# Patient Record
Sex: MALE | Race: WHITE | Employment: OTHER | URBAN - METROPOLITAN AREA
[De-identification: names, ages, dates, MRNs, and addresses within clinical notes are randomized per-mention and may not be internally consistent; named-entity substitution may affect disease eponyms.]

---

## 2017-07-26 ENCOUNTER — OFFICE VISIT (OUTPATIENT)
Dept: FAMILY MEDICINE CLINIC | Age: 67
End: 2017-07-26
Payer: MEDICARE

## 2017-07-26 ENCOUNTER — HOSPITAL ENCOUNTER (OUTPATIENT)
Age: 67
Setting detail: SPECIMEN
Discharge: HOME | End: 2017-07-26
Payer: MEDICARE

## 2017-07-26 VITALS
HEIGHT: 70 IN | WEIGHT: 213 LBS | BODY MASS INDEX: 30.49 KG/M2 | SYSTOLIC BLOOD PRESSURE: 140 MMHG | TEMPERATURE: 97 F | HEART RATE: 106 BPM | OXYGEN SATURATION: 98 % | DIASTOLIC BLOOD PRESSURE: 75 MMHG

## 2017-07-26 DIAGNOSIS — Z00.00 HEALTH CARE MAINTENANCE: ICD-10-CM

## 2017-07-26 DIAGNOSIS — Z79.4 TYPE 2 DIABETES MELLITUS WITH INSULIN THERAPY (HCC): ICD-10-CM

## 2017-07-26 DIAGNOSIS — E11.42 TYPE 2 DIABETES MELLITUS WITH DIABETIC POLYNEUROPATHY, WITHOUT LONG-TERM CURRENT USE OF INSULIN (HCC): Primary | ICD-10-CM

## 2017-07-26 DIAGNOSIS — G62.9 NEUROPATHY: ICD-10-CM

## 2017-07-26 DIAGNOSIS — E11.9 TYPE 2 DIABETES MELLITUS WITH INSULIN THERAPY (HCC): ICD-10-CM

## 2017-07-26 DIAGNOSIS — Z12.11 SCREENING FOR COLON CANCER: ICD-10-CM

## 2017-07-26 DIAGNOSIS — I10 ESSENTIAL HYPERTENSION: ICD-10-CM

## 2017-07-26 LAB
ABSOLUTE EOS #: 0.1 K/UL (ref 0–0.4)
ABSOLUTE LYMPH #: 1.6 K/UL (ref 1–4.8)
ABSOLUTE MONO #: 0.3 K/UL (ref 0.1–1.2)
ALBUMIN SERPL-MCNC: 4.1 G/DL (ref 3.5–5.2)
ALBUMIN/GLOBULIN RATIO: 1.3 (ref 1–2.5)
ALP BLD-CCNC: 73 U/L (ref 40–129)
ALT SERPL-CCNC: 15 U/L (ref 5–41)
ANION GAP SERPL CALCULATED.3IONS-SCNC: 17 MMOL/L (ref 9–17)
AST SERPL-CCNC: 15 U/L
BASOPHILS # BLD: 0 %
BASOPHILS ABSOLUTE: 0 K/UL (ref 0–0.2)
BILIRUB SERPL-MCNC: 0.35 MG/DL (ref 0.3–1.2)
BUN BLDV-MCNC: 15 MG/DL (ref 8–23)
BUN/CREAT BLD: ABNORMAL (ref 9–20)
CALCIUM SERPL-MCNC: 9.9 MG/DL (ref 8.6–10.4)
CHLORIDE BLD-SCNC: 96 MMOL/L (ref 98–107)
CHOLESTEROL/HDL RATIO: 6.2
CHOLESTEROL: 228 MG/DL
CO2: 19 MMOL/L (ref 20–31)
CREAT SERPL-MCNC: 0.76 MG/DL (ref 0.7–1.2)
CREATININE URINE POCT: 100
DIFFERENTIAL TYPE: NORMAL
EOSINOPHILS RELATIVE PERCENT: 2 %
GFR AFRICAN AMERICAN: >60 ML/MIN
GFR NON-AFRICAN AMERICAN: >60 ML/MIN
GFR SERPL CREATININE-BSD FRML MDRD: ABNORMAL ML/MIN/{1.73_M2}
GFR SERPL CREATININE-BSD FRML MDRD: ABNORMAL ML/MIN/{1.73_M2}
GLUCOSE BLD-MCNC: 425 MG/DL (ref 70–99)
HBA1C MFR BLD: 11.8 %
HCT VFR BLD CALC: 41.7 % (ref 41–53)
HDLC SERPL-MCNC: 37 MG/DL
HEMOGLOBIN: 14.3 G/DL (ref 13.5–17.5)
LDL CHOLESTEROL DIRECT: 110 MG/DL
LDL CHOLESTEROL: ABNORMAL MG/DL (ref 0–130)
LYMPHOCYTES # BLD: 26 %
MCH RBC QN AUTO: 29.1 PG (ref 26–34)
MCHC RBC AUTO-ENTMCNC: 34.3 G/DL (ref 31–37)
MCV RBC AUTO: 85 FL (ref 80–100)
MICROALBUMIN/CREAT 24H UR: 80 MG/G{CREAT}
MICROALBUMIN/CREAT UR-RTO: ABNORMAL
MONOCYTES # BLD: 4 %
PDW BLD-RTO: 14.6 % (ref 12.5–15.4)
PLATELET # BLD: 199 K/UL (ref 140–450)
PLATELET ESTIMATE: NORMAL
PMV BLD AUTO: 8.1 FL (ref 6–12)
POTASSIUM SERPL-SCNC: 4.7 MMOL/L (ref 3.7–5.3)
RBC # BLD: 4.9 M/UL (ref 4.5–5.9)
RBC # BLD: NORMAL 10*6/UL
SEG NEUTROPHILS: 68 %
SEGMENTED NEUTROPHILS ABSOLUTE COUNT: 4.3 K/UL (ref 1.8–7.7)
SODIUM BLD-SCNC: 132 MMOL/L (ref 135–144)
TOTAL PROTEIN: 7.3 G/DL (ref 6.4–8.3)
TRIGL SERPL-MCNC: 571 MG/DL
TSH SERPL DL<=0.05 MIU/L-ACNC: 1.9 MIU/L (ref 0.3–5)
VLDLC SERPL CALC-MCNC: ABNORMAL MG/DL (ref 1–30)
WBC # BLD: 6.4 K/UL (ref 3.5–11)
WBC # BLD: NORMAL 10*3/UL

## 2017-07-26 PROCEDURE — 99214 OFFICE O/P EST MOD 30 MIN: CPT | Performed by: NURSE PRACTITIONER

## 2017-07-26 PROCEDURE — G8417 CALC BMI ABV UP PARAM F/U: HCPCS | Performed by: NURSE PRACTITIONER

## 2017-07-26 PROCEDURE — 83036 HEMOGLOBIN GLYCOSYLATED A1C: CPT | Performed by: NURSE PRACTITIONER

## 2017-07-26 PROCEDURE — 82044 UR ALBUMIN SEMIQUANTITATIVE: CPT | Performed by: NURSE PRACTITIONER

## 2017-07-26 PROCEDURE — 4040F PNEUMOC VAC/ADMIN/RCVD: CPT | Performed by: NURSE PRACTITIONER

## 2017-07-26 PROCEDURE — 1123F ACP DISCUSS/DSCN MKR DOCD: CPT | Performed by: NURSE PRACTITIONER

## 2017-07-26 PROCEDURE — 3046F HEMOGLOBIN A1C LEVEL >9.0%: CPT | Performed by: NURSE PRACTITIONER

## 2017-07-26 PROCEDURE — G8427 DOCREV CUR MEDS BY ELIG CLIN: HCPCS | Performed by: NURSE PRACTITIONER

## 2017-07-26 PROCEDURE — 1036F TOBACCO NON-USER: CPT | Performed by: NURSE PRACTITIONER

## 2017-07-26 PROCEDURE — 3017F COLORECTAL CA SCREEN DOC REV: CPT | Performed by: NURSE PRACTITIONER

## 2017-07-26 RX ORDER — PREGABALIN 50 MG/1
50 CAPSULE ORAL 3 TIMES DAILY
Qty: 90 CAPSULE | Refills: 3 | Status: SHIPPED | OUTPATIENT
Start: 2017-07-26 | End: 2017-08-23 | Stop reason: SDUPTHER

## 2017-07-26 RX ORDER — ATORVASTATIN CALCIUM 80 MG/1
80 TABLET, FILM COATED ORAL DAILY
Qty: 30 TABLET | Refills: 3 | Status: SHIPPED | OUTPATIENT
Start: 2017-07-26 | End: 2017-11-27 | Stop reason: SDUPTHER

## 2017-07-26 ASSESSMENT — ENCOUNTER SYMPTOMS
ABDOMINAL PAIN: 0
NAUSEA: 0
CHEST TIGHTNESS: 0
VOMITING: 0
WHEEZING: 0
DIARRHEA: 0
SHORTNESS OF BREATH: 0

## 2017-07-26 ASSESSMENT — PATIENT HEALTH QUESTIONNAIRE - PHQ9
SUM OF ALL RESPONSES TO PHQ9 QUESTIONS 1 & 2: 2
SUM OF ALL RESPONSES TO PHQ QUESTIONS 1-9: 2
2. FEELING DOWN, DEPRESSED OR HOPELESS: 1
1. LITTLE INTEREST OR PLEASURE IN DOING THINGS: 1

## 2017-08-01 ENCOUNTER — TELEPHONE (OUTPATIENT)
Dept: FAMILY MEDICINE CLINIC | Age: 67
End: 2017-08-01

## 2017-08-23 ENCOUNTER — OFFICE VISIT (OUTPATIENT)
Dept: FAMILY MEDICINE CLINIC | Age: 67
End: 2017-08-23
Payer: MEDICARE

## 2017-08-23 VITALS
SYSTOLIC BLOOD PRESSURE: 120 MMHG | OXYGEN SATURATION: 98 % | TEMPERATURE: 97.6 F | WEIGHT: 216 LBS | BODY MASS INDEX: 30.99 KG/M2 | DIASTOLIC BLOOD PRESSURE: 70 MMHG | HEART RATE: 106 BPM

## 2017-08-23 DIAGNOSIS — E11.42 TYPE 2 DIABETES MELLITUS WITH DIABETIC POLYNEUROPATHY, WITHOUT LONG-TERM CURRENT USE OF INSULIN (HCC): ICD-10-CM

## 2017-08-23 DIAGNOSIS — Z79.4 TYPE 2 DIABETES MELLITUS WITH INSULIN THERAPY (HCC): Primary | ICD-10-CM

## 2017-08-23 DIAGNOSIS — M20.41 HAMMER TOE OF RIGHT FOOT: ICD-10-CM

## 2017-08-23 DIAGNOSIS — E11.9 TYPE 2 DIABETES MELLITUS WITH INSULIN THERAPY (HCC): Primary | ICD-10-CM

## 2017-08-23 DIAGNOSIS — L98.499 SKIN ULCER, WITH UNSPECIFIED SEVERITY (HCC): ICD-10-CM

## 2017-08-23 DIAGNOSIS — G62.9 NEUROPATHY: ICD-10-CM

## 2017-08-23 LAB — HBA1C MFR BLD: 10.9 %

## 2017-08-23 PROCEDURE — 99214 OFFICE O/P EST MOD 30 MIN: CPT | Performed by: NURSE PRACTITIONER

## 2017-08-23 PROCEDURE — 3017F COLORECTAL CA SCREEN DOC REV: CPT | Performed by: NURSE PRACTITIONER

## 2017-08-23 PROCEDURE — 1123F ACP DISCUSS/DSCN MKR DOCD: CPT | Performed by: NURSE PRACTITIONER

## 2017-08-23 PROCEDURE — 3046F HEMOGLOBIN A1C LEVEL >9.0%: CPT | Performed by: NURSE PRACTITIONER

## 2017-08-23 PROCEDURE — G8417 CALC BMI ABV UP PARAM F/U: HCPCS | Performed by: NURSE PRACTITIONER

## 2017-08-23 PROCEDURE — 1036F TOBACCO NON-USER: CPT | Performed by: NURSE PRACTITIONER

## 2017-08-23 PROCEDURE — 4040F PNEUMOC VAC/ADMIN/RCVD: CPT | Performed by: NURSE PRACTITIONER

## 2017-08-23 PROCEDURE — 83036 HEMOGLOBIN GLYCOSYLATED A1C: CPT | Performed by: NURSE PRACTITIONER

## 2017-08-23 PROCEDURE — G8427 DOCREV CUR MEDS BY ELIG CLIN: HCPCS | Performed by: NURSE PRACTITIONER

## 2017-08-23 RX ORDER — PREGABALIN 100 MG/1
100 CAPSULE ORAL 3 TIMES DAILY
Qty: 90 CAPSULE | Refills: 2 | Status: SHIPPED | OUTPATIENT
Start: 2017-08-23 | End: 2017-11-27 | Stop reason: SDUPTHER

## 2017-08-23 ASSESSMENT — ENCOUNTER SYMPTOMS
WHEEZING: 0
NAUSEA: 0
DIARRHEA: 0
CHEST TIGHTNESS: 0
SHORTNESS OF BREATH: 0
ABDOMINAL PAIN: 0
VOMITING: 0

## 2017-08-28 ENCOUNTER — TELEPHONE (OUTPATIENT)
Dept: FAMILY MEDICINE CLINIC | Age: 67
End: 2017-08-28

## 2017-08-29 DIAGNOSIS — E11.42 TYPE 2 DIABETES MELLITUS WITH DIABETIC POLYNEUROPATHY, WITHOUT LONG-TERM CURRENT USE OF INSULIN (HCC): ICD-10-CM

## 2017-09-21 ENCOUNTER — OFFICE VISIT (OUTPATIENT)
Dept: FAMILY MEDICINE CLINIC | Age: 67
End: 2017-09-21
Payer: MEDICARE

## 2017-09-21 VITALS
OXYGEN SATURATION: 98 % | DIASTOLIC BLOOD PRESSURE: 54 MMHG | WEIGHT: 221 LBS | SYSTOLIC BLOOD PRESSURE: 87 MMHG | BODY MASS INDEX: 31.71 KG/M2 | TEMPERATURE: 98.5 F | HEART RATE: 101 BPM

## 2017-09-21 DIAGNOSIS — E11.9 TYPE 2 DIABETES MELLITUS WITH INSULIN THERAPY (HCC): Primary | ICD-10-CM

## 2017-09-21 DIAGNOSIS — Z79.4 TYPE 2 DIABETES MELLITUS WITH INSULIN THERAPY (HCC): Primary | ICD-10-CM

## 2017-09-21 DIAGNOSIS — L98.499 SKIN ULCER, WITH UNSPECIFIED SEVERITY (HCC): ICD-10-CM

## 2017-09-21 LAB — HBA1C MFR BLD: 10.1 %

## 2017-09-21 PROCEDURE — 4040F PNEUMOC VAC/ADMIN/RCVD: CPT | Performed by: NURSE PRACTITIONER

## 2017-09-21 PROCEDURE — G8427 DOCREV CUR MEDS BY ELIG CLIN: HCPCS | Performed by: NURSE PRACTITIONER

## 2017-09-21 PROCEDURE — 1123F ACP DISCUSS/DSCN MKR DOCD: CPT | Performed by: NURSE PRACTITIONER

## 2017-09-21 PROCEDURE — 3017F COLORECTAL CA SCREEN DOC REV: CPT | Performed by: NURSE PRACTITIONER

## 2017-09-21 PROCEDURE — G8417 CALC BMI ABV UP PARAM F/U: HCPCS | Performed by: NURSE PRACTITIONER

## 2017-09-21 PROCEDURE — 99214 OFFICE O/P EST MOD 30 MIN: CPT | Performed by: NURSE PRACTITIONER

## 2017-09-21 PROCEDURE — 83036 HEMOGLOBIN GLYCOSYLATED A1C: CPT | Performed by: NURSE PRACTITIONER

## 2017-09-21 PROCEDURE — 3046F HEMOGLOBIN A1C LEVEL >9.0%: CPT | Performed by: NURSE PRACTITIONER

## 2017-09-21 PROCEDURE — 1036F TOBACCO NON-USER: CPT | Performed by: NURSE PRACTITIONER

## 2017-09-21 RX ORDER — PEN NEEDLE, DIABETIC 31 GX5/16"
NEEDLE, DISPOSABLE MISCELLANEOUS
Refills: 0 | COMMUNITY
Start: 2017-08-30 | End: 2017-10-23

## 2017-09-21 RX ORDER — AMOXICILLIN 500 MG/1
500 CAPSULE ORAL 3 TIMES DAILY
Qty: 30 CAPSULE | Refills: 0 | Status: SHIPPED | OUTPATIENT
Start: 2017-09-21 | End: 2017-10-01

## 2017-09-21 RX ORDER — ATORVASTATIN CALCIUM 80 MG/1
80 TABLET, FILM COATED ORAL DAILY
Qty: 30 TABLET | Refills: 5 | Status: CANCELLED | OUTPATIENT
Start: 2017-09-21

## 2017-09-21 RX ORDER — CIPROFLOXACIN 500 MG/1
500 TABLET, FILM COATED ORAL DAILY
Refills: 0 | COMMUNITY
Start: 2017-09-11 | End: 2017-10-23 | Stop reason: ALTCHOICE

## 2017-09-21 ASSESSMENT — ENCOUNTER SYMPTOMS
DIARRHEA: 0
CHEST TIGHTNESS: 0
NAUSEA: 0
ABDOMINAL PAIN: 0
WHEEZING: 0
SHORTNESS OF BREATH: 0
VOMITING: 0

## 2017-10-23 ENCOUNTER — OFFICE VISIT (OUTPATIENT)
Dept: FAMILY MEDICINE CLINIC | Age: 67
End: 2017-10-23
Payer: MEDICARE

## 2017-10-23 VITALS
DIASTOLIC BLOOD PRESSURE: 70 MMHG | WEIGHT: 221 LBS | SYSTOLIC BLOOD PRESSURE: 120 MMHG | TEMPERATURE: 97.9 F | HEART RATE: 101 BPM | BODY MASS INDEX: 31.71 KG/M2 | OXYGEN SATURATION: 98 %

## 2017-10-23 DIAGNOSIS — M20.41 HAMMER TOE OF RIGHT FOOT: ICD-10-CM

## 2017-10-23 DIAGNOSIS — E11.9 TYPE 2 DIABETES MELLITUS WITH INSULIN THERAPY (HCC): Primary | ICD-10-CM

## 2017-10-23 DIAGNOSIS — Z79.4 TYPE 2 DIABETES MELLITUS WITH INSULIN THERAPY (HCC): Primary | ICD-10-CM

## 2017-10-23 DIAGNOSIS — E11.42 TYPE 2 DIABETES MELLITUS WITH DIABETIC POLYNEUROPATHY, WITHOUT LONG-TERM CURRENT USE OF INSULIN (HCC): ICD-10-CM

## 2017-10-23 LAB — HBA1C MFR BLD: 9.3 %

## 2017-10-23 PROCEDURE — G8417 CALC BMI ABV UP PARAM F/U: HCPCS | Performed by: NURSE PRACTITIONER

## 2017-10-23 PROCEDURE — 1123F ACP DISCUSS/DSCN MKR DOCD: CPT | Performed by: NURSE PRACTITIONER

## 2017-10-23 PROCEDURE — 99213 OFFICE O/P EST LOW 20 MIN: CPT | Performed by: NURSE PRACTITIONER

## 2017-10-23 PROCEDURE — 1036F TOBACCO NON-USER: CPT | Performed by: NURSE PRACTITIONER

## 2017-10-23 PROCEDURE — G8427 DOCREV CUR MEDS BY ELIG CLIN: HCPCS | Performed by: NURSE PRACTITIONER

## 2017-10-23 PROCEDURE — 3017F COLORECTAL CA SCREEN DOC REV: CPT | Performed by: NURSE PRACTITIONER

## 2017-10-23 PROCEDURE — 3046F HEMOGLOBIN A1C LEVEL >9.0%: CPT | Performed by: NURSE PRACTITIONER

## 2017-10-23 PROCEDURE — 83036 HEMOGLOBIN GLYCOSYLATED A1C: CPT | Performed by: NURSE PRACTITIONER

## 2017-10-23 PROCEDURE — 4040F PNEUMOC VAC/ADMIN/RCVD: CPT | Performed by: NURSE PRACTITIONER

## 2017-10-23 PROCEDURE — G8484 FLU IMMUNIZE NO ADMIN: HCPCS | Performed by: NURSE PRACTITIONER

## 2017-10-23 RX ORDER — ATORVASTATIN CALCIUM 80 MG/1
80 TABLET, FILM COATED ORAL DAILY
Qty: 30 TABLET | Refills: 5 | Status: CANCELLED | OUTPATIENT
Start: 2017-10-23

## 2017-10-23 RX ORDER — PREGABALIN 100 MG/1
100 CAPSULE ORAL 3 TIMES DAILY
Qty: 90 CAPSULE | Refills: 2 | Status: CANCELLED | OUTPATIENT
Start: 2017-10-23

## 2017-10-23 ASSESSMENT — ENCOUNTER SYMPTOMS
WHEEZING: 0
NAUSEA: 0
DIARRHEA: 0
ABDOMINAL PAIN: 0
CHEST TIGHTNESS: 0
VOMITING: 0
SHORTNESS OF BREATH: 0

## 2017-10-23 NOTE — PROGRESS NOTES
Patient is present for a 1 month follow up for DM. Patient will get flu vaccine at [facility]. Visit Information    Have you changed or started any medications since your last visit including any over-the-counter medicines, vitamins, or herbal medicines? no   Have you stopped taking any of your medications? Is so, why? -  no  Are you having any side effects from any of your medications? - no    Have you seen any other physician or provider since your last visit?  no   Have you had any other diagnostic tests since your last visit?  no   Have you been seen in the emergency room and/or had an admission in a hospital since we last saw you?  no   Have you had your routine dental cleaning in the past 6 months?  no     Do you have an active MyChart account? If no, what is the barrier?   No:     Patient Care Team:  Patito Geronimo NP as PCP - General (Certified Nurse Practitioner)  [address] History Review  Past Medical, Family, and Social History reviewed and does contribute to the patient presenting condition    Health Maintenance   Topic Date Due    AAA screen  1950    Diabetic retinal exam  08/07/1960    Flu vaccine (1) 09/01/2017    Pneumococcal low/med risk (1 of 2 - PCV13) 01/26/2018 (Originally 8/7/2015)    Colon cancer screen colonoscopy  01/26/2018 (Originally 8/7/2000)    Zostavax vaccine  07/26/2018 (Originally 8/7/2010)    DTaP/Tdap/Td vaccine (1 - Tdap) 07/26/2018 (Originally 8/7/1969)    Diabetic hemoglobin A1C test  12/21/2017    Diabetic microalbuminuria test  07/26/2018    Lipid screen  07/26/2018    Diabetic foot exam  08/23/2018    Hepatitis C screen  Completed
Skin: Skin is warm and dry. No rash noted. No erythema. many tattoos   Psychiatric: He has a normal mood and affect. His behavior is normal. Judgment and thought content normal.   Vitals reviewed. Assessment:      1. Type 2 diabetes mellitus with insulin therapy ([facility] [address]    2. Type 2 diabetes mellitus with diabetic polyneuropathy, without long-term current use of insulin (HCC)    3. Hammer toe of right foot        Plan:     1. Type 2 diabetes mellitus with insulin therapy (HCC)    - POCT glycosylated hemoglobin (Hb A1C)-down to 9.3  Given [employer]'s assistance paperwork to see if we can keep him on toujeo. - Insulin Pen Needle 30G X 8 MM MISC; 1 each by Does not apply route daily  Dispense: 100 each; Refill: 5  He is not in need of refills today. Does not like the small needles, cannot tell if he has injected himself, larger ones sent in.   2. Type 2 diabetes mellitus with diabetic polyneuropathy, without long-term current use of insulin (HCC)    Continue to follow with Dr. Brook Ramírez This Encounter   Procedures    POCT glycosylated hemoglobin (Hb A1C)       No Follow-up on file. Patient given educational materials - see patient instructions. Discussed use, benefit, and side effects of prescribed medications. All patient questions answered. Pt voiced understanding. Reviewed health maintenance. Instructed to continue current medications, diet and exercise. Patient agreed with treatment plan. Follow up as directed.      Electronically signed by Zach Zambrano NP on 10/23/2017

## 2017-11-27 ENCOUNTER — OFFICE VISIT (OUTPATIENT)
Dept: FAMILY MEDICINE CLINIC | Age: 67
End: 2017-11-27
Payer: MEDICARE

## 2017-11-27 VITALS
OXYGEN SATURATION: 98 % | DIASTOLIC BLOOD PRESSURE: 70 MMHG | WEIGHT: 220 LBS | BODY MASS INDEX: 31.57 KG/M2 | SYSTOLIC BLOOD PRESSURE: 130 MMHG | HEART RATE: 104 BPM | TEMPERATURE: 98.2 F

## 2017-11-27 DIAGNOSIS — Z23 NEED FOR INFLUENZA VACCINATION: ICD-10-CM

## 2017-11-27 DIAGNOSIS — Z12.11 SCREENING FOR COLON CANCER: ICD-10-CM

## 2017-11-27 DIAGNOSIS — I10 ESSENTIAL HYPERTENSION: ICD-10-CM

## 2017-11-27 DIAGNOSIS — Z23 NEED FOR PNEUMOCOCCAL VACCINATION: ICD-10-CM

## 2017-11-27 DIAGNOSIS — Z79.4 TYPE 2 DIABETES MELLITUS WITH INSULIN THERAPY (HCC): ICD-10-CM

## 2017-11-27 DIAGNOSIS — E11.9 TYPE 2 DIABETES MELLITUS WITH INSULIN THERAPY (HCC): ICD-10-CM

## 2017-11-27 LAB — HBA1C MFR BLD: 10 %

## 2017-11-27 PROCEDURE — G0008 ADMIN INFLUENZA VIRUS VAC: HCPCS | Performed by: NURSE PRACTITIONER

## 2017-11-27 PROCEDURE — 90662 IIV NO PRSV INCREASED AG IM: CPT | Performed by: NURSE PRACTITIONER

## 2017-11-27 PROCEDURE — G8484 FLU IMMUNIZE NO ADMIN: HCPCS | Performed by: NURSE PRACTITIONER

## 2017-11-27 PROCEDURE — G0009 ADMIN PNEUMOCOCCAL VACCINE: HCPCS | Performed by: NURSE PRACTITIONER

## 2017-11-27 PROCEDURE — 99214 OFFICE O/P EST MOD 30 MIN: CPT | Performed by: NURSE PRACTITIONER

## 2017-11-27 PROCEDURE — 90670 PCV13 VACCINE IM: CPT | Performed by: NURSE PRACTITIONER

## 2017-11-27 PROCEDURE — 3046F HEMOGLOBIN A1C LEVEL >9.0%: CPT | Performed by: NURSE PRACTITIONER

## 2017-11-27 PROCEDURE — 4040F PNEUMOC VAC/ADMIN/RCVD: CPT | Performed by: NURSE PRACTITIONER

## 2017-11-27 PROCEDURE — 83036 HEMOGLOBIN GLYCOSYLATED A1C: CPT | Performed by: NURSE PRACTITIONER

## 2017-11-27 PROCEDURE — 1036F TOBACCO NON-USER: CPT | Performed by: NURSE PRACTITIONER

## 2017-11-27 PROCEDURE — 3017F COLORECTAL CA SCREEN DOC REV: CPT | Performed by: NURSE PRACTITIONER

## 2017-11-27 PROCEDURE — G8417 CALC BMI ABV UP PARAM F/U: HCPCS | Performed by: NURSE PRACTITIONER

## 2017-11-27 PROCEDURE — 1123F ACP DISCUSS/DSCN MKR DOCD: CPT | Performed by: NURSE PRACTITIONER

## 2017-11-27 PROCEDURE — G8427 DOCREV CUR MEDS BY ELIG CLIN: HCPCS | Performed by: NURSE PRACTITIONER

## 2017-11-27 RX ORDER — PREGABALIN 100 MG/1
100 CAPSULE ORAL 3 TIMES DAILY
Qty: 90 CAPSULE | Refills: 2 | Status: SHIPPED | OUTPATIENT
Start: 2017-11-27 | End: 2019-01-01

## 2017-11-27 RX ORDER — ATORVASTATIN CALCIUM 80 MG/1
80 TABLET, FILM COATED ORAL DAILY
Qty: 90 TABLET | Refills: 1 | Status: SHIPPED | OUTPATIENT
Start: 2017-11-27 | End: 2019-01-01

## 2017-11-27 ASSESSMENT — ENCOUNTER SYMPTOMS
NAUSEA: 0
DIARRHEA: 0
SHORTNESS OF BREATH: 0
VOMITING: 0
ABDOMINAL PAIN: 0
WHEEZING: 0
CHEST TIGHTNESS: 0

## 2017-11-27 NOTE — PROGRESS NOTES
Alcohol use No      Comment: occassionally      Current Outpatient Prescriptions   Medication Sig Dispense Refill    atorvastatin (LIPITOR) 80 MG tablet Take 1 tablet by mouth daily 90 tablet 1    aspirin 81 MG tablet Take 1 tablet by mouth daily (with breakfast) 30 tablet 11    insulin glargine (TOUJEO SOLOSTAR) 300 UNIT/ML injection pen Inject 70 Units into the skin nightly 2 Pen 5    metFORMIN (GLUCOPHAGE) 1000 MG tablet Take 1 tablet by mouth 2 times daily (with meals) 180 tablet 1    pregabalin (LYRICA) 100 MG capsule Take 1 capsule by mouth 3 times daily . 90 capsule 2    dapagliflozin (FARXIGA) 5 MG tablet Take 1 tablet by mouth every morning 90 tablet 1    insulin glargine (TOUJEO SOLOSTAR) 300 UNIT/ML injection pen Inject 70 Units into the skin nightly 2 Pen 0    Blood Glucose Monitoring Suppl DONG 1 Device by Does not apply route three times daily 1 Device 0    Insulin Pen Needle 30G X 8 MM MISC 1 each by Does not apply route daily 100 each 5    Blood Glucose Monitoring Suppl DONG Test 3 times a day   Can fill with whatever insurance will cover    Dx e11.9 1 Device 0    glucose blood VI test strips (GLUCOSE METER TEST) strip 1 each by In Vitro route daily 100 each 5    citalopram (CELEXA) 20 MG tablet Take 20 mg by mouth daily      QUEtiapine (SEROQUEL) 300 MG tablet Take 300 mg by mouth 2 times daily      Lancets MISC Test daily and prn 50 each 3     No current facility-administered medications for this visit.       Allergies   Allergen Reactions    Keflex [Cephalexin] Hives       Health Maintenance   Topic Date Due    AAA screen  1950    Colon cancer screen colonoscopy  02/27/2018 (Originally 8/7/2000)    Zostavax vaccine  07/26/2018 (Originally 8/7/2010)    DTaP/Tdap/Td vaccine (1 - Tdap) 07/26/2018 (Originally 8/7/1969)    Diabetic retinal exam  10/23/2018 (Originally 8/7/1960)    Diabetic hemoglobin A1C test  01/23/2018    Diabetic microalbuminuria test  07/26/2018    Lipid screen  07/26/2018    Diabetic foot exam  08/23/2018    Pneumococcal low/med risk (2 of 2 - PPSV23) 11/27/2018    Flu vaccine  Completed    Hepatitis C screen  Completed          Review of Systems   Constitutional: Negative for activity change, appetite change, chills, fatigue, fever and unexpected weight change. Eyes: Positive for visual disturbance (blurry vision in left eye). Respiratory: Negative for chest tightness, shortness of breath and wheezing. Cardiovascular: Negative for chest pain and leg swelling. Gastrointestinal: Negative for abdominal pain, diarrhea, nausea and vomiting. Endocrine: Positive for polydipsia and polyuria. Musculoskeletal: Positive for arthralgias. Negative for myalgias. + joint stiffness   Skin: Negative for rash and wound. + toenail problem   Neurological: Positive for numbness. Negative for dizziness, weakness and headaches. Psychiatric/Behavioral: Positive for dysphoric mood. All other systems reviewed and are negative. Objective:     /70 (Site: Left Arm, Position: Sitting, Cuff Size: Large Adult)   Pulse 104   Temp 98.2 °F (36.8 °C) (Oral)   Wt 220 lb (99.8 kg)   SpO2 98%   BMI 31.57 kg/m²   Physical Exam   Constitutional: He is oriented to person, place, and time. Vital signs are normal. He appears well-developed and well-nourished. HENT:   Head: Normocephalic and atraumatic. Eyes: Conjunctivae are normal. Pupils are equal, round, and reactive to light. Right eye exhibits no discharge. Left eye exhibits no discharge. Neck: Normal range of motion. Neck supple. No thyromegaly present. Cardiovascular: Normal rate, regular rhythm and normal heart sounds. Exam reveals no gallop and no friction rub. No murmur heard. Pulmonary/Chest: Effort normal and breath sounds normal. No respiratory distress. He has no wheezes. He has no rales. Abdominal: Soft. Bowel sounds are normal. He exhibits no distension.  There is no tenderness. Musculoskeletal: Normal range of motion. He exhibits no edema. Neurological: He is alert and oriented to person, place, and time. Skin: Skin is warm and dry. No rash noted. No erythema. many tattoos   Psychiatric: He has a normal mood and affect. His behavior is normal. Judgment and thought content normal.   Vitals reviewed. Assessment:      1. DM type 2, uncontrolled, with neuropathy ([facility] [address]    2. Type 2 diabetes mellitus with insulin therapy ([facility]    3. Need for influenza vaccination    4. Screening for colon cancer    5. Need for pneumococcal vaccination    6. Essential hypertension          Plan:     1. DM type 2, uncontrolled, with neuropathy (HCC)    - aspirin 81 MG tablet; Take 1 tablet by mouth daily (with breakfast)  Dispense: 30 tablet; Refill: 11  - metFORMIN (GLUCOPHAGE) 1000 MG tablet; Take 1 tablet by mouth 2 times daily (with meals)  Dispense: 180 tablet; Refill: 1  - dapagliflozin (FARXIGA) 5 MG tablet; Take 1 tablet by mouth every morning  Dispense: 90 tablet; Refill: 1  - insulin glargine (TOUJEO SOLOSTAR) 300 UNIT/ML injection pen; Inject 70 Units into the skin nightly  Dispense: 2 Pen; Refill: 0  - Blood Glucose Monitoring Suppl DONG; 1 Device by Does not apply route three times daily  Dispense: 1 Device; Refill: 0    2. Type 2 diabetes mellitus with insulin therapy (HCC)    - insulin glargine (TOUJEO SOLOSTAR) 300 UNIT/ML injection pen; Inject 70 Units into the skin nightly  Dispense: 2 Pen; Refill: 5  - metFORMIN (GLUCOPHAGE) 1000 MG tablet; Take 1 tablet by mouth 2 times daily (with meals)  Dispense: 180 tablet; Refill: 1  - POCT glycosylated hemoglobin (Hb A1C)-up at10  - dapagliflozin (FARXIGA) 5 MG tablet; Take 1 tablet by mouth every morning  Dispense: 90 tablet; Refill: 1  - insulin glargine (TOUJEO SOLOSTAR) 300 UNIT/ML injection pen;  Inject 70 Units into the skin nightly  Dispense: 2 Pen; Refill: 0  - Blood Glucose Monitoring Suppl DONG; 1 Device by Does

## 2017-11-27 NOTE — PROGRESS NOTES
Patient is present for a 1 month follow up for dm. Patient is having trouble seeing out of left eye. Patient is complaining of hand shakiness. He states this happens on and off but has been getting worse. Medication and pharmacy reviewed with patient. Patient needs a meter. Printed out assistance paperwork for the toujeo. Patient is asking for 2 samples of toujeo. Medications and pharmacy reviewed with patient. Patient gets constipated with seroquel and would like to decrease the mg if possible. Patient sees a therapist that prescribes the seroquel but patient is not to fond of her. (siva) [employer]    Visit Information    Have you changed or started any medications since your last visit including any over-the-counter medicines, vitamins, or herbal medicines? no   Have you stopped taking any of your medications? Is so, why? -  no  Are you having any side effects from any of your medications? - no    Have you seen any other physician or provider since your last visit?  no   Have you had any other diagnostic tests since your last visit?  no   Have you been seen in the emergency room and/or had an admission in a hospital since we last saw you?  no   Have you had your routine dental cleaning in the past 6 months?  no     Do you have an active MyChart account? If no, what is the barrier?   No:     Patient Care Team:  Jovanna Kim NP as PCP - General (Certified Nurse Practitioner)  Jovanna Kim NP as PCP - [facility]  [facility]    Medical History Review  Past Medical, Family, and Social History reviewed and does contribute to the patient presenting condition    Health Maintenance   Topic Date Due    AAA screen  1950    Colon cancer screen colonoscopy  08/07/2000    Pneumococcal low/med risk (1 of 2 - PCV13) 01/26/2018 (Originally 8/7/2015)    Zostavax vaccine  07/26/2018 (Originally 8/7/2010)    DTaP/Tdap/Td vaccine (1 - Tdap) 07/26/2018 (Originally 8/7/1969)  Diabetic retinal exam  10/23/2018 (Originally 8/7/1960)    Flu vaccine (1) 10/23/2018 (Originally 9/1/2017)    Diabetic hemoglobin A1C test  01/23/2018    Diabetic microalbuminuria test  07/26/2018    Lipid screen  07/26/2018    Diabetic foot exam  08/23/2018    Hepatitis C screen  Completed

## 2017-11-30 DIAGNOSIS — E11.9 TYPE 2 DIABETES MELLITUS WITH INSULIN THERAPY (HCC): Primary | ICD-10-CM

## 2017-11-30 DIAGNOSIS — Z79.4 TYPE 2 DIABETES MELLITUS WITH INSULIN THERAPY (HCC): Primary | ICD-10-CM

## 2018-02-16 NOTE — TELEPHONE ENCOUNTER
Pt is threatening to leave the practice if this is not taken care of by the end of the day today (2-16-18). He said you do this every time and he thinks you're doing it on purpose. \"Just because you don't want to do something, don't mean I have to suffer\".

## 2018-02-19 NOTE — TELEPHONE ENCOUNTER
02/16/2018 1 11/27/2017 LYRICA 100 MG CAPSULE 0 30 SH TUR [id number] THE(9900) 0 0.00 - Private Pay [place] 01/02/2018 1 08/23/2017 LYRICA 100 MG CAPSULE 90 30 SH TUR [id number] THE(9900) 2 0.00 - Medicare [place] 11/04/2017 1 08/23/2017 LYRICA 100 MG CAPSULE 90 30 SH TUR [id number] THE(9900) 1 0.00 - [place] 08/24/2017 1 08/23/2017 LYRICA 100 MG CAPSULE 90 30 SH TUR [id number] THE(9900) 0 0.00 - [place] 07/26/2017 1 07/26/2017 LYRICA 50 MG CAPSULE 90 30 SH TUR [id number] BUJ(9941) 0 0.00 - [place] 03/27/2017 1 10/25/2016 GABAPENTIN 600 MG TABLET 90 30 AS THO [id number] THE(9900) 3 0.00 - [place]

## 2018-02-19 NOTE — TELEPHONE ENCOUNTER
Pt wanted to know if it could be increased. Said he is taking it TID and it's not helping.  He said he is still having a lot of leg pain

## 2018-02-20 ENCOUNTER — TELEPHONE (OUTPATIENT)
Dept: FAMILY MEDICINE CLINIC | Age: 68
End: 2018-02-20

## 2018-02-20 RX ORDER — PREGABALIN 100 MG/1
100 CAPSULE ORAL 3 TIMES DAILY
Qty: 90 CAPSULE | Refills: 1 | OUTPATIENT
Start: 2018-02-20 | End: 2018-03-22

## 2019-01-01 ENCOUNTER — PRE-PROCEDURE TELEPHONE (OUTPATIENT)
Dept: WOUND CARE | Age: 69
End: 2019-01-01

## 2019-01-01 ENCOUNTER — HOSPITAL ENCOUNTER (EMERGENCY)
Age: 69
Discharge: HOME | End: 2019-07-15
Attending: EMERGENCY MEDICINE
Payer: MEDICARE

## 2019-01-01 ENCOUNTER — OFFICE VISIT (OUTPATIENT)
Dept: PODIATRY | Age: 69
End: 2019-01-01
Payer: MEDICARE

## 2019-01-01 ENCOUNTER — APPOINTMENT (OUTPATIENT)
Dept: GENERAL RADIOLOGY | Age: 69
DRG: 853 | End: 2019-01-01
Payer: MEDICARE

## 2019-01-01 ENCOUNTER — APPOINTMENT (OUTPATIENT)
Dept: GENERAL RADIOLOGY | Age: 69
End: 2019-01-01
Payer: MEDICARE

## 2019-01-01 ENCOUNTER — APPOINTMENT (OUTPATIENT)
Dept: INTERVENTIONAL RADIOLOGY/VASCULAR | Age: 69
DRG: 853 | End: 2019-01-01
Payer: MEDICARE

## 2019-01-01 ENCOUNTER — OFFICE VISIT (OUTPATIENT)
Dept: FAMILY MEDICINE CLINIC | Age: 69
End: 2019-01-01
Payer: MEDICARE

## 2019-01-01 ENCOUNTER — HOSPITAL ENCOUNTER (OUTPATIENT)
Age: 69
Setting detail: SPECIMEN
Discharge: HOME | End: 2019-02-27
Payer: MEDICARE

## 2019-01-01 ENCOUNTER — ANESTHESIA (OUTPATIENT)
Dept: OPERATING ROOM | Age: 69
DRG: 853 | End: 2019-01-01
Payer: MEDICARE

## 2019-01-01 ENCOUNTER — TELEPHONE (OUTPATIENT)
Dept: VASCULAR SURGERY | Age: 69
End: 2019-01-01

## 2019-01-01 ENCOUNTER — ANESTHESIA EVENT (OUTPATIENT)
Dept: OPERATING ROOM | Age: 69
DRG: 853 | End: 2019-01-01
Payer: MEDICARE

## 2019-01-01 ENCOUNTER — APPOINTMENT (OUTPATIENT)
Dept: ULTRASOUND IMAGING | Age: 69
DRG: 853 | End: 2019-01-01
Payer: MEDICARE

## 2019-01-01 ENCOUNTER — TELEPHONE (OUTPATIENT)
Dept: PODIATRY | Age: 69
End: 2019-01-01

## 2019-01-01 ENCOUNTER — HOSPITAL ENCOUNTER (OUTPATIENT)
Dept: WOUND CARE | Age: 69
Discharge: HOME | End: 2019-06-18
Payer: MEDICARE

## 2019-01-01 ENCOUNTER — TELEPHONE (OUTPATIENT)
Dept: FAMILY MEDICINE CLINIC | Age: 69
End: 2019-01-01

## 2019-01-01 ENCOUNTER — HOSPITAL ENCOUNTER (INPATIENT)
Age: 69
LOS: 8 days | Discharge: NURSING, REHAB OR LONG-TERM CARE | DRG: 853 | End: 2019-05-31
Attending: EMERGENCY MEDICINE | Admitting: INTERNAL MEDICINE
Payer: MEDICARE

## 2019-01-01 ENCOUNTER — HOSPITAL ENCOUNTER (OUTPATIENT)
Dept: VASCULAR LAB | Age: 69
Discharge: HOME | End: 2019-03-19
Payer: MEDICARE

## 2019-01-01 ENCOUNTER — APPOINTMENT (OUTPATIENT)
Dept: MRI IMAGING | Age: 69
DRG: 853 | End: 2019-01-01
Payer: MEDICARE

## 2019-01-01 VITALS — BODY MASS INDEX: 29.54 KG/M2 | HEIGHT: 71 IN | WEIGHT: 211 LBS

## 2019-01-01 VITALS — DIASTOLIC BLOOD PRESSURE: 55 MMHG | SYSTOLIC BLOOD PRESSURE: 92 MMHG | OXYGEN SATURATION: 98 %

## 2019-01-01 VITALS — HEIGHT: 71 IN | WEIGHT: 211 LBS | BODY MASS INDEX: 29.54 KG/M2

## 2019-01-01 VITALS
HEART RATE: 129 BPM | WEIGHT: 185 LBS | OXYGEN SATURATION: 85 % | SYSTOLIC BLOOD PRESSURE: 94 MMHG | BODY MASS INDEX: 26.17 KG/M2 | DIASTOLIC BLOOD PRESSURE: 57 MMHG | RESPIRATION RATE: 39 BRPM | TEMPERATURE: 101.3 F

## 2019-01-01 VITALS
WEIGHT: 211.4 LBS | DIASTOLIC BLOOD PRESSURE: 56 MMHG | OXYGEN SATURATION: 99 % | BODY MASS INDEX: 31.31 KG/M2 | RESPIRATION RATE: 16 BRPM | HEART RATE: 95 BPM | TEMPERATURE: 98.6 F | SYSTOLIC BLOOD PRESSURE: 82 MMHG | HEIGHT: 69 IN

## 2019-01-01 VITALS
HEART RATE: 89 BPM | DIASTOLIC BLOOD PRESSURE: 77 MMHG | SYSTOLIC BLOOD PRESSURE: 134 MMHG | BODY MASS INDEX: 29.13 KG/M2 | TEMPERATURE: 99 F | HEIGHT: 70 IN | WEIGHT: 203.48 LBS | RESPIRATION RATE: 21 BRPM | OXYGEN SATURATION: 93 %

## 2019-01-01 VITALS — WEIGHT: 200 LBS | BODY MASS INDEX: 28 KG/M2 | HEIGHT: 71 IN

## 2019-01-01 VITALS
HEART RATE: 91 BPM | BODY MASS INDEX: 30.53 KG/M2 | OXYGEN SATURATION: 99 % | RESPIRATION RATE: 16 BRPM | DIASTOLIC BLOOD PRESSURE: 54 MMHG | SYSTOLIC BLOOD PRESSURE: 126 MMHG | WEIGHT: 215.8 LBS | TEMPERATURE: 96.9 F

## 2019-01-01 VITALS — OXYGEN SATURATION: 100 % | DIASTOLIC BLOOD PRESSURE: 76 MMHG | SYSTOLIC BLOOD PRESSURE: 107 MMHG

## 2019-01-01 VITALS — HEIGHT: 71 IN | BODY MASS INDEX: 29.54 KG/M2 | WEIGHT: 211 LBS

## 2019-01-01 DIAGNOSIS — E78.00 PURE HYPERCHOLESTEROLEMIA: ICD-10-CM

## 2019-01-01 DIAGNOSIS — Z12.5 SCREENING FOR PROSTATE CANCER: ICD-10-CM

## 2019-01-01 DIAGNOSIS — L97.322 ULCER OF ANKLE WITH FAT LAYER EXPOSED, LEFT (HCC): Primary | ICD-10-CM

## 2019-01-01 DIAGNOSIS — E78.2 MIXED HYPERLIPIDEMIA: ICD-10-CM

## 2019-01-01 DIAGNOSIS — L89.624 PRESSURE INJURY OF LEFT HEEL, STAGE 4 (HCC): Primary | ICD-10-CM

## 2019-01-01 DIAGNOSIS — E11.42 DIABETIC POLYNEUROPATHY ASSOCIATED WITH TYPE 2 DIABETES MELLITUS (HCC): ICD-10-CM

## 2019-01-01 DIAGNOSIS — M79.675 PAIN OF TOES OF BOTH FEET: ICD-10-CM

## 2019-01-01 DIAGNOSIS — Z13.0 SCREENING FOR DEFICIENCY ANEMIA: ICD-10-CM

## 2019-01-01 DIAGNOSIS — D64.9 ANEMIA, UNSPECIFIED TYPE: ICD-10-CM

## 2019-01-01 DIAGNOSIS — E11.51 TYPE 2 DIABETES MELLITUS WITH PERIPHERAL VASCULAR DISEASE (HCC): ICD-10-CM

## 2019-01-01 DIAGNOSIS — M20.41 HAMMER TOES OF BOTH FEET: ICD-10-CM

## 2019-01-01 DIAGNOSIS — B35.1 ONYCHOMYCOSIS OF TOENAIL: ICD-10-CM

## 2019-01-01 DIAGNOSIS — E87.5 HYPERKALEMIA: Primary | ICD-10-CM

## 2019-01-01 DIAGNOSIS — E11.10 DIABETIC KETOACIDOSIS WITHOUT COMA ASSOCIATED WITH TYPE 2 DIABETES MELLITUS (HCC): ICD-10-CM

## 2019-01-01 DIAGNOSIS — E11.65 UNCONTROLLED TYPE 2 DIABETES MELLITUS WITH HYPERGLYCEMIA (HCC): Primary | ICD-10-CM

## 2019-01-01 DIAGNOSIS — Z13.29 SCREENING FOR THYROID DISORDER: ICD-10-CM

## 2019-01-01 DIAGNOSIS — L97.429 DIABETIC ULCER OF LEFT HEEL ASSOCIATED WITH TYPE 2 DIABETES MELLITUS, UNSPECIFIED ULCER STAGE (HCC): ICD-10-CM

## 2019-01-01 DIAGNOSIS — M20.42 HAMMER TOES OF BOTH FEET: ICD-10-CM

## 2019-01-01 DIAGNOSIS — M79.672 PAIN IN LEFT FOOT: ICD-10-CM

## 2019-01-01 DIAGNOSIS — R50.9 FEVER CHILLS: ICD-10-CM

## 2019-01-01 DIAGNOSIS — M86.172 ACUTE OSTEOMYELITIS OF CALCANEUM, LEFT (HCC): ICD-10-CM

## 2019-01-01 DIAGNOSIS — I73.9 PAD (PERIPHERAL ARTERY DISEASE) (HCC): ICD-10-CM

## 2019-01-01 DIAGNOSIS — L89.623 PRESSURE INJURY OF LEFT HEEL, STAGE 3 (HCC): Primary | ICD-10-CM

## 2019-01-01 DIAGNOSIS — M25.572 ACUTE LEFT ANKLE PAIN: ICD-10-CM

## 2019-01-01 DIAGNOSIS — I73.9 INTERMITTENT CLAUDICATION (HCC): ICD-10-CM

## 2019-01-01 DIAGNOSIS — D64.9 ANEMIA, UNSPECIFIED TYPE: Primary | ICD-10-CM

## 2019-01-01 DIAGNOSIS — A41.9 SEPSIS ASSOCIATED HYPOTENSION (HCC): ICD-10-CM

## 2019-01-01 DIAGNOSIS — I95.9 SEPSIS ASSOCIATED HYPOTENSION (HCC): ICD-10-CM

## 2019-01-01 DIAGNOSIS — L97.529 ULCER OF LEFT FOOT, UNSPECIFIED ULCER STAGE (HCC): ICD-10-CM

## 2019-01-01 DIAGNOSIS — M79.674 PAIN OF TOES OF BOTH FEET: ICD-10-CM

## 2019-01-01 DIAGNOSIS — E11.621 DIABETIC ULCER OF LEFT HEEL ASSOCIATED WITH TYPE 2 DIABETES MELLITUS, UNSPECIFIED ULCER STAGE (HCC): ICD-10-CM

## 2019-01-01 DIAGNOSIS — A41.9 SEPTICEMIA (HCC): Primary | ICD-10-CM

## 2019-01-01 LAB
-: ABNORMAL
ABO/RH: NORMAL
ABSOLUTE EOS #: 0 K/UL (ref 0–0.4)
ABSOLUTE EOS #: 0 K/UL (ref 0–0.4)
ABSOLUTE EOS #: 0.04 K/UL (ref 0–0.44)
ABSOLUTE EOS #: 0.12 K/UL (ref 0–0.44)
ABSOLUTE EOS #: 0.13 K/UL (ref 0–0.44)
ABSOLUTE EOS #: 0.16 K/UL (ref 0–0.44)
ABSOLUTE EOS #: 0.18 K/UL (ref 0–0.44)
ABSOLUTE EOS #: <0.03 K/UL (ref 0–0.44)
ABSOLUTE EOS #: <0.03 K/UL (ref 0–0.44)
ABSOLUTE IMMATURE GRANULOCYTE: 0 K/UL (ref 0–0.3)
ABSOLUTE IMMATURE GRANULOCYTE: 0.06 K/UL (ref 0–0.3)
ABSOLUTE IMMATURE GRANULOCYTE: 0.07 K/UL (ref 0–0.3)
ABSOLUTE IMMATURE GRANULOCYTE: 0.11 K/UL (ref 0–0.3)
ABSOLUTE IMMATURE GRANULOCYTE: 0.11 K/UL (ref 0–0.3)
ABSOLUTE IMMATURE GRANULOCYTE: 0.12 K/UL (ref 0–0.3)
ABSOLUTE IMMATURE GRANULOCYTE: 0.16 K/UL (ref 0–0.3)
ABSOLUTE IMMATURE GRANULOCYTE: 0.17 K/UL (ref 0–0.3)
ABSOLUTE IMMATURE GRANULOCYTE: <0.03 K/UL (ref 0–0.3)
ABSOLUTE LYMPH #: 0.52 K/UL (ref 1–4.8)
ABSOLUTE LYMPH #: 0.98 K/UL (ref 1.1–3.7)
ABSOLUTE LYMPH #: 0.99 K/UL (ref 1.1–3.7)
ABSOLUTE LYMPH #: 1 K/UL (ref 1.1–3.7)
ABSOLUTE LYMPH #: 1.08 K/UL (ref 1.1–3.7)
ABSOLUTE LYMPH #: 1.12 K/UL (ref 1–4.8)
ABSOLUTE LYMPH #: 1.14 K/UL (ref 1.1–3.7)
ABSOLUTE LYMPH #: 1.51 K/UL (ref 1.1–3.7)
ABSOLUTE LYMPH #: 1.63 K/UL (ref 1.1–3.7)
ABSOLUTE MONO #: 0.28 K/UL (ref 0.1–0.8)
ABSOLUTE MONO #: 0.36 K/UL (ref 0.1–1.2)
ABSOLUTE MONO #: 0.38 K/UL (ref 0.1–1.2)
ABSOLUTE MONO #: 0.52 K/UL (ref 0.1–1.2)
ABSOLUTE MONO #: 0.53 K/UL (ref 0.1–1.2)
ABSOLUTE MONO #: 0.64 K/UL (ref 0.1–1.2)
ABSOLUTE MONO #: 0.65 K/UL (ref 0.1–1.2)
ABSOLUTE MONO #: 0.79 K/UL (ref 0.1–1.2)
ABSOLUTE MONO #: 0.86 K/UL (ref 0.1–0.8)
ABSOLUTE RETIC #: 0.07 M/UL (ref 0.03–0.08)
ALBUMIN (CALCULATED): 2.6 G/DL (ref 3.2–5.2)
ALBUMIN PERCENT: 46 % (ref 45–65)
ALBUMIN SERPL-MCNC: 2.3 G/DL (ref 3.5–5.2)
ALBUMIN SERPL-MCNC: 2.5 G/DL (ref 3.5–5.2)
ALBUMIN SERPL-MCNC: 3.4 G/DL (ref 3.5–5.2)
ALBUMIN SERPL-MCNC: 3.7 G/DL (ref 3.5–5.2)
ALBUMIN/GLOBULIN RATIO: 0.6 (ref 1–2.5)
ALBUMIN/GLOBULIN RATIO: 0.6 (ref 1–2.5)
ALBUMIN/GLOBULIN RATIO: 0.7 (ref 1–2.5)
ALBUMIN/GLOBULIN RATIO: 1.2 (ref 1–2.5)
ALLEN TEST: ABNORMAL
ALP BLD-CCNC: 120 U/L (ref 40–129)
ALP BLD-CCNC: 80 U/L (ref 40–129)
ALP BLD-CCNC: 86 U/L (ref 40–129)
ALP BLD-CCNC: 88 U/L (ref 40–129)
ALPHA 1 PERCENT: 5 % (ref 3–6)
ALPHA 2 PERCENT: 16 % (ref 6–13)
ALPHA-1-GLOBULIN: 0.3 G/DL (ref 0.1–0.4)
ALPHA-2-GLOBULIN: 0.9 G/DL (ref 0.5–0.9)
ALT SERPL-CCNC: 11 U/L (ref 5–41)
ALT SERPL-CCNC: 17 U/L (ref 5–41)
ALT SERPL-CCNC: 6 U/L (ref 5–41)
ALT SERPL-CCNC: 8 U/L (ref 5–41)
AMMONIA: 29 UMOL/L (ref 16–60)
AMORPHOUS: ABNORMAL
ANION GAP SERPL CALCULATED.3IONS-SCNC: 10 MMOL/L (ref 9–17)
ANION GAP SERPL CALCULATED.3IONS-SCNC: 11 MMOL/L (ref 9–17)
ANION GAP SERPL CALCULATED.3IONS-SCNC: 13 MMOL/L (ref 9–17)
ANION GAP SERPL CALCULATED.3IONS-SCNC: 14 MMOL/L (ref 9–17)
ANION GAP SERPL CALCULATED.3IONS-SCNC: 15 MMOL/L (ref 9–17)
ANION GAP SERPL CALCULATED.3IONS-SCNC: 17 MMOL/L (ref 9–17)
ANION GAP SERPL CALCULATED.3IONS-SCNC: 20 MMOL/L (ref 9–17)
ANION GAP SERPL CALCULATED.3IONS-SCNC: 22 MMOL/L (ref 9–17)
ANION GAP SERPL CALCULATED.3IONS-SCNC: 22 MMOL/L (ref 9–17)
ANION GAP SERPL CALCULATED.3IONS-SCNC: 24 MMOL/L (ref 9–17)
ANION GAP SERPL CALCULATED.3IONS-SCNC: 31 MMOL/L (ref 9–17)
ANION GAP SERPL CALCULATED.3IONS-SCNC: 34 MMOL/L (ref 9–17)
ANION GAP SERPL CALCULATED.3IONS-SCNC: 44 MMOL/L (ref 9–17)
ANION GAP SERPL CALCULATED.3IONS-SCNC: 9 MMOL/L (ref 9–17)
ANTI-NUCLEAR ANTIBODY (ANA): NEGATIVE
ANTIBODY SCREEN: NEGATIVE
ARM BAND NUMBER: NORMAL
AST SERPL-CCNC: 11 U/L
AST SERPL-CCNC: 17 U/L
AST SERPL-CCNC: 21 U/L
AST SERPL-CCNC: 6 U/L
BACTERIA: ABNORMAL
BASOPHILS # BLD: 0 % (ref 0–2)
BASOPHILS # BLD: 1 % (ref 0–2)
BASOPHILS ABSOLUTE: 0 K/UL (ref 0–0.2)
BASOPHILS ABSOLUTE: 0 K/UL (ref 0–0.2)
BASOPHILS ABSOLUTE: 0.03 K/UL (ref 0–0.2)
BASOPHILS ABSOLUTE: 0.05 K/UL (ref 0–0.2)
BASOPHILS ABSOLUTE: <0.03 K/UL (ref 0–0.2)
BETA GLOBULIN: 0.9 G/DL (ref 0.5–1.1)
BETA PERCENT: 17 % (ref 11–19)
BETA-HYDROXYBUTYRATE: 1.48 MMOL/L (ref 0.02–0.27)
BETA-HYDROXYBUTYRATE: 1.92 MMOL/L (ref 0.02–0.27)
BETA-HYDROXYBUTYRATE: 13.66 MMOL/L (ref 0.02–0.27)
BILIRUB SERPL-MCNC: 0.2 MG/DL (ref 0.3–1.2)
BILIRUB SERPL-MCNC: 0.24 MG/DL (ref 0.3–1.2)
BILIRUB SERPL-MCNC: 0.33 MG/DL (ref 0.3–1.2)
BILIRUB SERPL-MCNC: 0.43 MG/DL (ref 0.3–1.2)
BILIRUBIN DIRECT: 0.08 MG/DL
BILIRUBIN URINE: NEGATIVE
BILIRUBIN, INDIRECT: 0.16 MG/DL (ref 0–1)
BLD PROD TYP BPU: NORMAL
BNP INTERPRETATION: ABNORMAL
BUN BLDV-MCNC: 105 MG/DL (ref 8–23)
BUN BLDV-MCNC: 106 MG/DL (ref 8–23)
BUN BLDV-MCNC: 14 MG/DL (ref 8–23)
BUN BLDV-MCNC: 14 MG/DL (ref 8–23)
BUN BLDV-MCNC: 16 MG/DL (ref 8–23)
BUN BLDV-MCNC: 17 MG/DL (ref 8–23)
BUN BLDV-MCNC: 21 MG/DL (ref 8–23)
BUN BLDV-MCNC: 31 MG/DL (ref 8–23)
BUN BLDV-MCNC: 35 MG/DL (ref 8–23)
BUN BLDV-MCNC: 39 MG/DL (ref 8–23)
BUN BLDV-MCNC: 42 MG/DL (ref 8–23)
BUN BLDV-MCNC: 49 MG/DL (ref 8–23)
BUN BLDV-MCNC: 51 MG/DL (ref 8–23)
BUN BLDV-MCNC: 62 MG/DL (ref 8–23)
BUN BLDV-MCNC: 68 MG/DL (ref 8–23)
BUN BLDV-MCNC: 80 MG/DL (ref 8–23)
BUN BLDV-MCNC: 84 MG/DL (ref 8–23)
BUN BLDV-MCNC: 86 MG/DL (ref 8–23)
BUN BLDV-MCNC: 95 MG/DL (ref 8–23)
BUN BLDV-MCNC: 99 MG/DL (ref 8–23)
BUN/CREAT BLD: ABNORMAL (ref 9–20)
C-REACTIVE PROTEIN: 150.6 MG/L (ref 0–5)
C-REACTIVE PROTEIN: 186 MG/L (ref 0–5)
CALCIUM SERPL-MCNC: 7.5 MG/DL (ref 8.6–10.4)
CALCIUM SERPL-MCNC: 7.6 MG/DL (ref 8.6–10.4)
CALCIUM SERPL-MCNC: 7.7 MG/DL (ref 8.6–10.4)
CALCIUM SERPL-MCNC: 7.8 MG/DL (ref 8.6–10.4)
CALCIUM SERPL-MCNC: 8.1 MG/DL (ref 8.6–10.4)
CALCIUM SERPL-MCNC: 8.2 MG/DL (ref 8.6–10.4)
CALCIUM SERPL-MCNC: 8.3 MG/DL (ref 8.6–10.4)
CALCIUM SERPL-MCNC: 8.4 MG/DL (ref 8.6–10.4)
CALCIUM SERPL-MCNC: 8.4 MG/DL (ref 8.6–10.4)
CALCIUM SERPL-MCNC: 8.6 MG/DL (ref 8.6–10.4)
CALCIUM SERPL-MCNC: 8.6 MG/DL (ref 8.6–10.4)
CALCIUM SERPL-MCNC: 8.7 MG/DL (ref 8.6–10.4)
CALCIUM SERPL-MCNC: 8.7 MG/DL (ref 8.6–10.4)
CALCIUM SERPL-MCNC: 9 MG/DL (ref 8.6–10.4)
CALCIUM SERPL-MCNC: 9.3 MG/DL (ref 8.6–10.4)
CARBOXYHEMOGLOBIN: 1.3 % (ref 0–5)
CASTS UA: ABNORMAL /LPF (ref 0–8)
CHLORIDE BLD-SCNC: 100 MMOL/L (ref 98–107)
CHLORIDE BLD-SCNC: 101 MMOL/L (ref 98–107)
CHLORIDE BLD-SCNC: 79 MMOL/L (ref 98–107)
CHLORIDE BLD-SCNC: 87 MMOL/L (ref 98–107)
CHLORIDE BLD-SCNC: 88 MMOL/L (ref 98–107)
CHLORIDE BLD-SCNC: 94 MMOL/L (ref 98–107)
CHLORIDE BLD-SCNC: 95 MMOL/L (ref 98–107)
CHLORIDE BLD-SCNC: 96 MMOL/L (ref 98–107)
CHLORIDE BLD-SCNC: 97 MMOL/L (ref 98–107)
CHLORIDE BLD-SCNC: 99 MMOL/L (ref 98–107)
CHOLESTEROL, FASTING: 231 MG/DL
CHOLESTEROL/HDL RATIO: 7.7
CO2: 10 MMOL/L (ref 20–31)
CO2: 16 MMOL/L (ref 20–31)
CO2: 17 MMOL/L (ref 20–31)
CO2: 18 MMOL/L (ref 20–31)
CO2: 20 MMOL/L (ref 20–31)
CO2: 22 MMOL/L (ref 20–31)
CO2: 23 MMOL/L (ref 20–31)
CO2: 23 MMOL/L (ref 20–31)
CO2: 24 MMOL/L (ref 20–31)
CO2: 25 MMOL/L (ref 20–31)
CO2: 27 MMOL/L (ref 20–31)
CO2: 27 MMOL/L (ref 20–31)
CO2: 28 MMOL/L (ref 20–31)
CO2: 30 MMOL/L (ref 20–31)
CO2: 34 MMOL/L (ref 20–31)
CO2: 35 MMOL/L (ref 20–31)
CO2: 6 MMOL/L (ref 20–31)
CO2: 7 MMOL/L (ref 20–31)
COLOR: YELLOW
COMPLEMENT C3: 127 MG/DL (ref 90–180)
COMPLEMENT C4: 27 MG/DL (ref 10–40)
CREAT SERPL-MCNC: 0.98 MG/DL (ref 0.7–1.2)
CREAT SERPL-MCNC: 1.02 MG/DL (ref 0.7–1.2)
CREAT SERPL-MCNC: 1.06 MG/DL (ref 0.7–1.2)
CREAT SERPL-MCNC: 1.06 MG/DL (ref 0.7–1.2)
CREAT SERPL-MCNC: 1.17 MG/DL (ref 0.7–1.2)
CREAT SERPL-MCNC: 1.18 MG/DL (ref 0.7–1.2)
CREAT SERPL-MCNC: 1.25 MG/DL (ref 0.7–1.2)
CREAT SERPL-MCNC: 1.31 MG/DL (ref 0.7–1.2)
CREAT SERPL-MCNC: 1.33 MG/DL (ref 0.7–1.2)
CREAT SERPL-MCNC: 1.57 MG/DL (ref 0.7–1.2)
CREAT SERPL-MCNC: 1.59 MG/DL (ref 0.7–1.2)
CREAT SERPL-MCNC: 1.76 MG/DL (ref 0.7–1.2)
CREAT SERPL-MCNC: 1.88 MG/DL (ref 0.7–1.2)
CREAT SERPL-MCNC: 2.04 MG/DL (ref 0.7–1.2)
CREAT SERPL-MCNC: 2.17 MG/DL (ref 0.7–1.2)
CREAT SERPL-MCNC: 2.25 MG/DL (ref 0.7–1.2)
CREAT SERPL-MCNC: 2.29 MG/DL (ref 0.7–1.2)
CREAT SERPL-MCNC: 3.41 MG/DL (ref 0.7–1.2)
CREAT SERPL-MCNC: 3.64 MG/DL (ref 0.7–1.2)
CREAT SERPL-MCNC: 4.55 MG/DL (ref 0.7–1.2)
CREATININE URINE: 125.6 MG/DL (ref 39–259)
CREATININE URINE: 43.9 MG/DL (ref 39–259)
CROSSMATCH RESULT: NORMAL
CRYSTALS, UA: ABNORMAL /HPF
CULTURE: ABNORMAL
CULTURE: NO GROWTH
CULTURE: NORMAL
DIFFERENTIAL TYPE: ABNORMAL
DIRECT EXAM: ABNORMAL
DISPENSE STATUS BLOOD BANK: NORMAL
EKG ATRIAL RATE: 108 BPM
EKG ATRIAL RATE: 110 BPM
EKG ATRIAL RATE: 113 BPM
EKG ATRIAL RATE: 127 BPM
EKG P AXIS: 0 DEGREES
EKG P AXIS: 18 DEGREES
EKG P AXIS: 9 DEGREES
EKG P-R INTERVAL: 130 MS
EKG P-R INTERVAL: 136 MS
EKG P-R INTERVAL: 136 MS
EKG Q-T INTERVAL: 342 MS
EKG Q-T INTERVAL: 344 MS
EKG Q-T INTERVAL: 352 MS
EKG Q-T INTERVAL: 376 MS
EKG QRS DURATION: 86 MS
EKG QRS DURATION: 90 MS
EKG QRS DURATION: 90 MS
EKG QRS DURATION: 96 MS
EKG QTC CALCULATION (BAZETT): 465 MS
EKG QTC CALCULATION (BAZETT): 474 MS
EKG QTC CALCULATION (BAZETT): 475 MS
EKG QTC CALCULATION (BAZETT): 497 MS
EKG R AXIS: 38 DEGREES
EKG R AXIS: 43 DEGREES
EKG R AXIS: 65 DEGREES
EKG R AXIS: 73 DEGREES
EKG T AXIS: 14 DEGREES
EKG T AXIS: 18 DEGREES
EKG T AXIS: 4 DEGREES
EKG T AXIS: 54 DEGREES
EKG VENTRICULAR RATE: 109 BPM
EKG VENTRICULAR RATE: 110 BPM
EKG VENTRICULAR RATE: 127 BPM
EKG VENTRICULAR RATE: 96 BPM
EOSINOPHILS RELATIVE PERCENT: 0 % (ref 1–4)
EOSINOPHILS RELATIVE PERCENT: 1 % (ref 1–4)
EOSINOPHILS RELATIVE PERCENT: 1 % (ref 1–4)
EOSINOPHILS RELATIVE PERCENT: 2 % (ref 1–4)
EOSINOPHILS RELATIVE PERCENT: 2 % (ref 1–4)
EOSINOPHILS RELATIVE PERCENT: 3 % (ref 1–4)
EPITHELIAL CELLS UA: ABNORMAL /HPF (ref 0–5)
ESTIMATED AVERAGE GLUCOSE: 255 MG/DL
EXPIRATION DATE: NORMAL
FERRITIN: 1170 UG/L (ref 30–400)
FERRITIN: 157 UG/L (ref 30–400)
FIO2: ABNORMAL
FOLATE: >20 NG/ML
FREE KAPPA/LAMBDA RATIO: 2.23 (ref 0.26–1.65)
GAMMA GLOBULIN %: 16 % (ref 9–20)
GAMMA GLOBULIN: 0.9 G/DL (ref 0.5–1.5)
GFR AFRICAN AMERICAN: 16 ML/MIN
GFR AFRICAN AMERICAN: 20 ML/MIN
GFR AFRICAN AMERICAN: 22 ML/MIN
GFR AFRICAN AMERICAN: 35 ML/MIN
GFR AFRICAN AMERICAN: 35 ML/MIN
GFR AFRICAN AMERICAN: 37 ML/MIN
GFR AFRICAN AMERICAN: 40 ML/MIN
GFR AFRICAN AMERICAN: 43 ML/MIN
GFR AFRICAN AMERICAN: 47 ML/MIN
GFR AFRICAN AMERICAN: 53 ML/MIN
GFR AFRICAN AMERICAN: 54 ML/MIN
GFR AFRICAN AMERICAN: >60 ML/MIN
GFR NON-AFRICAN AMERICAN: 13 ML/MIN
GFR NON-AFRICAN AMERICAN: 17 ML/MIN
GFR NON-AFRICAN AMERICAN: 18 ML/MIN
GFR NON-AFRICAN AMERICAN: 29 ML/MIN
GFR NON-AFRICAN AMERICAN: 29 ML/MIN
GFR NON-AFRICAN AMERICAN: 30 ML/MIN
GFR NON-AFRICAN AMERICAN: 33 ML/MIN
GFR NON-AFRICAN AMERICAN: 36 ML/MIN
GFR NON-AFRICAN AMERICAN: 39 ML/MIN
GFR NON-AFRICAN AMERICAN: 44 ML/MIN
GFR NON-AFRICAN AMERICAN: 44 ML/MIN
GFR NON-AFRICAN AMERICAN: 53 ML/MIN
GFR NON-AFRICAN AMERICAN: 54 ML/MIN
GFR NON-AFRICAN AMERICAN: 57 ML/MIN
GFR NON-AFRICAN AMERICAN: >60 ML/MIN
GFR SERPL CREATININE-BSD FRML MDRD: ABNORMAL ML/MIN/{1.73_M2}
GLOBULIN: ABNORMAL G/DL (ref 1.5–3.8)
GLUCOSE BLD-MCNC: 100 MG/DL (ref 75–110)
GLUCOSE BLD-MCNC: 137 MG/DL (ref 75–110)
GLUCOSE BLD-MCNC: 142 MG/DL (ref 75–110)
GLUCOSE BLD-MCNC: 143 MG/DL (ref 75–110)
GLUCOSE BLD-MCNC: 146 MG/DL (ref 75–110)
GLUCOSE BLD-MCNC: 146 MG/DL (ref 75–110)
GLUCOSE BLD-MCNC: 153 MG/DL (ref 75–110)
GLUCOSE BLD-MCNC: 153 MG/DL (ref 75–110)
GLUCOSE BLD-MCNC: 154 MG/DL (ref 75–110)
GLUCOSE BLD-MCNC: 156 MG/DL (ref 75–110)
GLUCOSE BLD-MCNC: 158 MG/DL (ref 75–110)
GLUCOSE BLD-MCNC: 160 MG/DL (ref 75–110)
GLUCOSE BLD-MCNC: 162 MG/DL (ref 75–110)
GLUCOSE BLD-MCNC: 164 MG/DL (ref 70–99)
GLUCOSE BLD-MCNC: 166 MG/DL (ref 75–110)
GLUCOSE BLD-MCNC: 170 MG/DL (ref 75–110)
GLUCOSE BLD-MCNC: 170 MG/DL (ref 75–110)
GLUCOSE BLD-MCNC: 171 MG/DL (ref 75–110)
GLUCOSE BLD-MCNC: 172 MG/DL (ref 75–110)
GLUCOSE BLD-MCNC: 172 MG/DL (ref 75–110)
GLUCOSE BLD-MCNC: 175 MG/DL (ref 75–110)
GLUCOSE BLD-MCNC: 177 MG/DL (ref 70–99)
GLUCOSE BLD-MCNC: 177 MG/DL (ref 75–110)
GLUCOSE BLD-MCNC: 179 MG/DL (ref 70–99)
GLUCOSE BLD-MCNC: 180 MG/DL (ref 75–110)
GLUCOSE BLD-MCNC: 181 MG/DL (ref 75–110)
GLUCOSE BLD-MCNC: 182 MG/DL (ref 75–110)
GLUCOSE BLD-MCNC: 184 MG/DL (ref 75–110)
GLUCOSE BLD-MCNC: 184 MG/DL (ref 75–110)
GLUCOSE BLD-MCNC: 185 MG/DL (ref 75–110)
GLUCOSE BLD-MCNC: 186 MG/DL (ref 75–110)
GLUCOSE BLD-MCNC: 187 MG/DL (ref 75–110)
GLUCOSE BLD-MCNC: 187 MG/DL (ref 75–110)
GLUCOSE BLD-MCNC: 188 MG/DL (ref 75–110)
GLUCOSE BLD-MCNC: 189 MG/DL (ref 75–110)
GLUCOSE BLD-MCNC: 194 MG/DL (ref 75–110)
GLUCOSE BLD-MCNC: 195 MG/DL (ref 70–99)
GLUCOSE BLD-MCNC: 196 MG/DL (ref 75–110)
GLUCOSE BLD-MCNC: 197 MG/DL (ref 70–99)
GLUCOSE BLD-MCNC: 197 MG/DL (ref 75–110)
GLUCOSE BLD-MCNC: 198 MG/DL (ref 70–99)
GLUCOSE BLD-MCNC: 199 MG/DL (ref 70–99)
GLUCOSE BLD-MCNC: 202 MG/DL (ref 70–99)
GLUCOSE BLD-MCNC: 205 MG/DL (ref 75–110)
GLUCOSE BLD-MCNC: 212 MG/DL (ref 70–99)
GLUCOSE BLD-MCNC: 212 MG/DL (ref 75–110)
GLUCOSE BLD-MCNC: 222 MG/DL (ref 75–110)
GLUCOSE BLD-MCNC: 224 MG/DL (ref 70–99)
GLUCOSE BLD-MCNC: 229 MG/DL (ref 70–99)
GLUCOSE BLD-MCNC: 243 MG/DL (ref 75–110)
GLUCOSE BLD-MCNC: 245 MG/DL (ref 70–99)
GLUCOSE BLD-MCNC: 251 MG/DL (ref 75–110)
GLUCOSE BLD-MCNC: 254 MG/DL (ref 75–110)
GLUCOSE BLD-MCNC: 255 MG/DL (ref 75–110)
GLUCOSE BLD-MCNC: 258 MG/DL (ref 75–110)
GLUCOSE BLD-MCNC: 258 MG/DL (ref 75–110)
GLUCOSE BLD-MCNC: 260 MG/DL (ref 75–110)
GLUCOSE BLD-MCNC: 266 MG/DL (ref 75–110)
GLUCOSE BLD-MCNC: 268 MG/DL (ref 75–110)
GLUCOSE BLD-MCNC: 269 MG/DL (ref 75–110)
GLUCOSE BLD-MCNC: 271 MG/DL (ref 70–99)
GLUCOSE BLD-MCNC: 271 MG/DL (ref 75–110)
GLUCOSE BLD-MCNC: 280 MG/DL (ref 75–110)
GLUCOSE BLD-MCNC: 281 MG/DL (ref 75–110)
GLUCOSE BLD-MCNC: 287 MG/DL (ref 75–110)
GLUCOSE BLD-MCNC: 298 MG/DL (ref 70–99)
GLUCOSE BLD-MCNC: 298 MG/DL (ref 75–110)
GLUCOSE BLD-MCNC: 304 MG/DL (ref 70–99)
GLUCOSE BLD-MCNC: 338 MG/DL (ref 75–110)
GLUCOSE BLD-MCNC: 338 MG/DL (ref 75–110)
GLUCOSE BLD-MCNC: 343 MG/DL (ref 75–110)
GLUCOSE BLD-MCNC: 344 MG/DL (ref 75–110)
GLUCOSE BLD-MCNC: 358 MG/DL (ref 75–110)
GLUCOSE BLD-MCNC: 366 MG/DL (ref 75–110)
GLUCOSE BLD-MCNC: 396 MG/DL (ref 75–110)
GLUCOSE BLD-MCNC: 399 MG/DL (ref 70–99)
GLUCOSE BLD-MCNC: 407 MG/DL (ref 75–110)
GLUCOSE BLD-MCNC: 443 MG/DL (ref 70–99)
GLUCOSE BLD-MCNC: 453 MG/DL (ref 75–110)
GLUCOSE BLD-MCNC: 473 MG/DL (ref 75–110)
GLUCOSE BLD-MCNC: 499 MG/DL (ref 75–110)
GLUCOSE BLD-MCNC: 538 MG/DL (ref 70–99)
GLUCOSE BLD-MCNC: 549 MG/DL (ref 75–110)
GLUCOSE BLD-MCNC: 611 MG/DL (ref 70–99)
GLUCOSE BLD-MCNC: 64 MG/DL (ref 75–110)
GLUCOSE BLD-MCNC: 74 MG/DL (ref 75–110)
GLUCOSE BLD-MCNC: 830 MG/DL (ref 70–99)
GLUCOSE BLD-MCNC: >600 MG/DL (ref 75–110)
GLUCOSE BLD-MCNC: >600 MG/DL (ref 75–110)
GLUCOSE URINE: ABNORMAL
HBA1C MFR BLD: 10.5 % (ref 4–6)
HBA1C MFR BLD: 12 %
HCO3 VENOUS: 18.2 MMOL/L (ref 24–30)
HCT VFR BLD CALC: 21.4 % (ref 40.7–50.3)
HCT VFR BLD CALC: 21.5 % (ref 40.7–50.3)
HCT VFR BLD CALC: 21.7 % (ref 40.7–50.3)
HCT VFR BLD CALC: 22.1 % (ref 40.7–50.3)
HCT VFR BLD CALC: 22.2 % (ref 40.7–50.3)
HCT VFR BLD CALC: 22.4 % (ref 40.7–50.3)
HCT VFR BLD CALC: 22.6 % (ref 40.7–50.3)
HCT VFR BLD CALC: 22.6 % (ref 40.7–50.3)
HCT VFR BLD CALC: 22.7 % (ref 40.7–50.3)
HCT VFR BLD CALC: 22.9 % (ref 40.7–50.3)
HCT VFR BLD CALC: 23 % (ref 40.7–50.3)
HCT VFR BLD CALC: 23.1 % (ref 40.7–50.3)
HCT VFR BLD CALC: 23.1 % (ref 40.7–50.3)
HCT VFR BLD CALC: 23.2 % (ref 40.7–50.3)
HCT VFR BLD CALC: 23.4 % (ref 40.7–50.3)
HCT VFR BLD CALC: 23.4 % (ref 40.7–50.3)
HCT VFR BLD CALC: 23.5 % (ref 40.7–50.3)
HCT VFR BLD CALC: 23.5 % (ref 40.7–50.3)
HCT VFR BLD CALC: 23.7 % (ref 40.7–50.3)
HCT VFR BLD CALC: 23.8 % (ref 40.7–50.3)
HCT VFR BLD CALC: 24.1 % (ref 40.7–50.3)
HCT VFR BLD CALC: 25.1 % (ref 40.7–50.3)
HCT VFR BLD CALC: 25.8 % (ref 40.7–50.3)
HCT VFR BLD CALC: 26.4 % (ref 40.7–50.3)
HCT VFR BLD CALC: 38.8 % (ref 40.7–50.3)
HDLC SERPL-MCNC: 30 MG/DL
HEMOGLOBIN: 12.8 G/DL (ref 13–17)
HEMOGLOBIN: 6.7 G/DL (ref 13–17)
HEMOGLOBIN: 6.7 G/DL (ref 13–17)
HEMOGLOBIN: 6.9 G/DL (ref 13–17)
HEMOGLOBIN: 7 G/DL (ref 13–17)
HEMOGLOBIN: 7.1 G/DL (ref 13–17)
HEMOGLOBIN: 7.2 G/DL (ref 13–17)
HEMOGLOBIN: 7.3 G/DL (ref 13–17)
HEMOGLOBIN: 7.4 G/DL (ref 13–17)
HEMOGLOBIN: 7.5 G/DL (ref 13–17)
HEMOGLOBIN: 7.6 G/DL (ref 13–17)
HEMOGLOBIN: 7.6 G/DL (ref 13–17)
HEMOGLOBIN: 7.7 G/DL (ref 13–17)
HEMOGLOBIN: 7.8 G/DL (ref 13–17)
HEMOGLOBIN: 8.3 G/DL (ref 13–17)
HEMOGLOBIN: 8.3 G/DL (ref 13–17)
HEMOGLOBIN: 8.7 G/DL (ref 13–17)
HIV AG/AB: NONREACTIVE
IMMATURE GRANULOCYTES: 0 %
IMMATURE GRANULOCYTES: 0 %
IMMATURE GRANULOCYTES: 1 %
IMMATURE RETIC FRACT: 16.8 % (ref 2.7–18.3)
INR BLD: 1.1
INR BLD: 1.3
INTERVENTION: NORMAL
INTERVENTION: NORMAL
IRON SATURATION: 14 % (ref 20–55)
IRON SATURATION: 25 % (ref 20–55)
IRON: 18 UG/DL (ref 59–158)
IRON: 73 UG/DL (ref 59–158)
KAPPA FREE LIGHT CHAINS QNT: 10.73 MG/DL (ref 0.37–1.94)
KETONES, URINE: ABNORMAL
LACTIC ACID, SEPSIS WHOLE BLOOD: 1.9 MMOL/L (ref 0.5–1.9)
LACTIC ACID, SEPSIS WHOLE BLOOD: 2.1 MMOL/L (ref 0.5–1.9)
LACTIC ACID, SEPSIS WHOLE BLOOD: 3.7 MMOL/L (ref 0.5–1.9)
LACTIC ACID, SEPSIS: ABNORMAL MMOL/L (ref 0.5–1.9)
LACTIC ACID, SEPSIS: ABNORMAL MMOL/L (ref 0.5–1.9)
LACTIC ACID, SEPSIS: NORMAL MMOL/L (ref 0.5–1.9)
LACTIC ACID, WHOLE BLOOD: 1 MMOL/L (ref 0.7–2.1)
LACTIC ACID, WHOLE BLOOD: 2.2 MMOL/L (ref 0.7–2.1)
LAMBDA FREE LIGHT CHAINS QNT: 4.81 MG/DL (ref 0.57–2.63)
LDL CHOLESTEROL: 121 MG/DL (ref 0–130)
LEUKOCYTE ESTERASE, URINE: NEGATIVE
LIPASE: 32 U/L (ref 13–60)
LV EF: 60 %
LVEF MODALITY: NORMAL
LYMPHOCYTES # BLD: 10 % (ref 24–43)
LYMPHOCYTES # BLD: 11 % (ref 24–43)
LYMPHOCYTES # BLD: 11 % (ref 24–43)
LYMPHOCYTES # BLD: 12 % (ref 24–43)
LYMPHOCYTES # BLD: 13 % (ref 24–43)
LYMPHOCYTES # BLD: 27 % (ref 24–43)
LYMPHOCYTES # BLD: 3 % (ref 24–44)
LYMPHOCYTES # BLD: 4 % (ref 24–44)
LYMPHOCYTES # BLD: 7 % (ref 24–43)
Lab: ABNORMAL
Lab: ABNORMAL
Lab: NORMAL
MAGNESIUM: 1.6 MG/DL (ref 1.6–2.6)
MAGNESIUM: 1.8 MG/DL (ref 1.6–2.6)
MAGNESIUM: 1.8 MG/DL (ref 1.6–2.6)
MAGNESIUM: 1.9 MG/DL (ref 1.6–2.6)
MAGNESIUM: 2.2 MG/DL (ref 1.6–2.6)
MAGNESIUM: 2.4 MG/DL (ref 1.6–2.6)
MAGNESIUM: 2.5 MG/DL (ref 1.6–2.6)
MAGNESIUM: 3.1 MG/DL (ref 1.6–2.6)
MCH RBC QN AUTO: 25.1 PG (ref 25.2–33.5)
MCH RBC QN AUTO: 27.2 PG (ref 25.2–33.5)
MCH RBC QN AUTO: 27.5 PG (ref 25.2–33.5)
MCH RBC QN AUTO: 27.5 PG (ref 25.2–33.5)
MCH RBC QN AUTO: 27.6 PG (ref 25.2–33.5)
MCH RBC QN AUTO: 28.1 PG (ref 25.2–33.5)
MCH RBC QN AUTO: 28.4 PG (ref 25.2–33.5)
MCH RBC QN AUTO: 28.4 PG (ref 25.2–33.5)
MCH RBC QN AUTO: 28.9 PG (ref 25.2–33.5)
MCHC RBC AUTO-ENTMCNC: 29.1 G/DL (ref 28.4–34.8)
MCHC RBC AUTO-ENTMCNC: 29.5 G/DL (ref 28.4–34.8)
MCHC RBC AUTO-ENTMCNC: 29.9 G/DL (ref 28.4–34.8)
MCHC RBC AUTO-ENTMCNC: 30.2 G/DL (ref 28.4–34.8)
MCHC RBC AUTO-ENTMCNC: 30.8 G/DL (ref 28.4–34.8)
MCHC RBC AUTO-ENTMCNC: 31.9 G/DL (ref 28.4–34.8)
MCHC RBC AUTO-ENTMCNC: 32.7 G/DL (ref 28.4–34.8)
MCHC RBC AUTO-ENTMCNC: 33 G/DL (ref 28.4–34.8)
MCHC RBC AUTO-ENTMCNC: 33.6 G/DL (ref 28.4–34.8)
MCV RBC AUTO: 84.5 FL (ref 82.6–102.9)
MCV RBC AUTO: 85.1 FL (ref 82.6–102.9)
MCV RBC AUTO: 86.2 FL (ref 82.6–102.9)
MCV RBC AUTO: 88.2 FL (ref 82.6–102.9)
MCV RBC AUTO: 88.3 FL (ref 82.6–102.9)
MCV RBC AUTO: 89.7 FL (ref 82.6–102.9)
MCV RBC AUTO: 91.1 FL (ref 82.6–102.9)
MCV RBC AUTO: 91.8 FL (ref 82.6–102.9)
MCV RBC AUTO: 93.3 FL (ref 82.6–102.9)
METHEMOGLOBIN: ABNORMAL % (ref 0–1.5)
MICROALBUMIN/CREAT 24H UR: 109 MG/L
MICROALBUMIN/CREAT UR-RTO: 87 MCG/MG CREAT
MODE: ABNORMAL
MONOCYTES # BLD: 1 % (ref 1–7)
MONOCYTES # BLD: 4 % (ref 3–12)
MONOCYTES # BLD: 4 % (ref 3–12)
MONOCYTES # BLD: 5 % (ref 1–7)
MONOCYTES # BLD: 6 % (ref 3–12)
MONOCYTES # BLD: 7 % (ref 3–12)
MONOCYTES # BLD: 7 % (ref 3–12)
MORPHOLOGY: ABNORMAL
MRSA, DNA, NASAL: ABNORMAL
MUCUS: ABNORMAL
MYOGLOBIN: 189 NG/ML (ref 28–72)
NEGATIVE BASE EXCESS, VEN: 5.2 MMOL/L (ref 0–2)
NITRITE, URINE: NEGATIVE
NOTIFICATION TIME: ABNORMAL
NOTIFICATION: ABNORMAL
NRBC AUTOMATED: 0 PER 100 WBC
O2 DEVICE/FLOW/%: ABNORMAL
O2 SAT, VEN: 61.8 % (ref 60–85)
OTHER OBSERVATIONS UA: ABNORMAL
OXYHEMOGLOBIN: ABNORMAL % (ref 95–98)
PARTIAL THROMBOPLASTIN TIME: 24.7 SEC (ref 20.5–30.5)
PARTIAL THROMBOPLASTIN TIME: 28.7 SEC (ref 20.5–30.5)
PATHOLOGIST: ABNORMAL
PATHOLOGIST: NORMAL
PATIENT TEMP: 37
PCO2, VEN, TEMP ADJ: ABNORMAL MMHG (ref 39–55)
PCO2, VEN: 30.2 (ref 39–55)
PDW BLD-RTO: 13.2 % (ref 11.8–14.4)
PDW BLD-RTO: 13.5 % (ref 11.8–14.4)
PDW BLD-RTO: 13.6 % (ref 11.8–14.4)
PDW BLD-RTO: 13.6 % (ref 11.8–14.4)
PDW BLD-RTO: 13.7 % (ref 11.8–14.4)
PDW BLD-RTO: 13.8 % (ref 11.8–14.4)
PDW BLD-RTO: 13.8 % (ref 11.8–14.4)
PDW BLD-RTO: 14.1 % (ref 11.8–14.4)
PDW BLD-RTO: 15.3 % (ref 11.8–14.4)
PEEP/CPAP: ABNORMAL
PH UA: 5 (ref 5–8)
PH VENOUS: 7.4 (ref 7.32–7.42)
PH, VEN, TEMP ADJ: ABNORMAL (ref 7.32–7.42)
PHOSPHORUS: 1.7 MG/DL (ref 2.5–4.5)
PHOSPHORUS: 2 MG/DL (ref 2.5–4.5)
PHOSPHORUS: 2.2 MG/DL (ref 2.5–4.5)
PHOSPHORUS: 2.3 MG/DL (ref 2.5–4.5)
PHOSPHORUS: 3.1 MG/DL (ref 2.5–4.5)
PHOSPHORUS: 5.4 MG/DL (ref 2.5–4.5)
PLATELET # BLD: 152 K/UL (ref 138–453)
PLATELET # BLD: 181 K/UL (ref 138–453)
PLATELET # BLD: 226 K/UL (ref 138–453)
PLATELET # BLD: 258 K/UL (ref 138–453)
PLATELET # BLD: 287 K/UL (ref 138–453)
PLATELET # BLD: 322 K/UL (ref 138–453)
PLATELET # BLD: 361 K/UL (ref 138–453)
PLATELET # BLD: 376 K/UL (ref 138–453)
PLATELET # BLD: 542 K/UL (ref 138–453)
PLATELET ESTIMATE: ABNORMAL
PMV BLD AUTO: 10.1 FL (ref 8.1–13.5)
PMV BLD AUTO: 10.1 FL (ref 8.1–13.5)
PMV BLD AUTO: 10.4 FL (ref 8.1–13.5)
PMV BLD AUTO: 10.6 FL (ref 8.1–13.5)
PMV BLD AUTO: 10.7 FL (ref 8.1–13.5)
PMV BLD AUTO: 10.9 FL (ref 8.1–13.5)
PMV BLD AUTO: 9.7 FL (ref 8.1–13.5)
PMV BLD AUTO: 9.7 FL (ref 8.1–13.5)
PMV BLD AUTO: 9.8 FL (ref 8.1–13.5)
PO2, VEN, TEMP ADJ: ABNORMAL MMHG (ref 30–50)
PO2, VEN: 38.8 (ref 30–50)
POSITIVE BASE EXCESS, VEN: ABNORMAL MMOL/L (ref 0–2)
POTASSIUM SERPL-SCNC: 3.2 MMOL/L (ref 3.7–5.3)
POTASSIUM SERPL-SCNC: 3.3 MMOL/L (ref 3.7–5.3)
POTASSIUM SERPL-SCNC: 3.6 MMOL/L (ref 3.7–5.3)
POTASSIUM SERPL-SCNC: 3.6 MMOL/L (ref 3.7–5.3)
POTASSIUM SERPL-SCNC: 3.7 MMOL/L (ref 3.7–5.3)
POTASSIUM SERPL-SCNC: 3.7 MMOL/L (ref 3.7–5.3)
POTASSIUM SERPL-SCNC: 3.8 MMOL/L (ref 3.7–5.3)
POTASSIUM SERPL-SCNC: 3.9 MMOL/L (ref 3.7–5.3)
POTASSIUM SERPL-SCNC: 3.9 MMOL/L (ref 3.7–5.3)
POTASSIUM SERPL-SCNC: 4 MMOL/L (ref 3.7–5.3)
POTASSIUM SERPL-SCNC: 4.1 MMOL/L (ref 3.7–5.3)
POTASSIUM SERPL-SCNC: 4.2 MMOL/L (ref 3.7–5.3)
POTASSIUM SERPL-SCNC: 4.2 MMOL/L (ref 3.7–5.3)
POTASSIUM SERPL-SCNC: 4.5 MMOL/L (ref 3.7–5.3)
POTASSIUM SERPL-SCNC: 5.1 MMOL/L (ref 3.7–5.3)
POTASSIUM SERPL-SCNC: 5.2 MMOL/L (ref 3.7–5.3)
POTASSIUM SERPL-SCNC: 6.2 MMOL/L (ref 3.7–5.3)
POTASSIUM SERPL-SCNC: 6.6 MMOL/L (ref 3.7–5.3)
PRO-BNP: 1745 PG/ML
PROSTATE SPECIFIC ANTIGEN: 0.65 UG/L
PROTEIN ELECTROPHORESIS, SERUM: ABNORMAL
PROTEIN UA: ABNORMAL
PROTHROMBIN TIME: 11.1 SEC (ref 9–12)
PROTHROMBIN TIME: 13.7 SEC (ref 9–12)
PSV: ABNORMAL
PT. POSITION: ABNORMAL
RBC # BLD: 2.44 M/UL (ref 4.21–5.77)
RBC # BLD: 2.46 M/UL (ref 4.21–5.77)
RBC # BLD: 2.54 M/UL (ref 4.21–5.77)
RBC # BLD: 2.56 M/UL (ref 4.21–5.77)
RBC # BLD: 2.58 M/UL (ref 4.21–5.77)
RBC # BLD: 2.61 M/UL (ref 4.21–5.77)
RBC # BLD: 2.71 M/UL (ref 4.21–5.77)
RBC # BLD: 3.03 M/UL (ref 4.21–5.77)
RBC # BLD: 4.5 M/UL (ref 4.21–5.77)
RBC # BLD: ABNORMAL 10*6/UL
RBC UA: ABNORMAL /HPF (ref 0–4)
RENAL EPITHELIAL, UA: ABNORMAL /HPF
RESPIRATORY RATE: ABNORMAL
RETIC %: 2.4 % (ref 0.5–1.9)
RETIC HEMOGLOBIN: 25.4 PG (ref 28.2–35.7)
SAMPLE SITE: ABNORMAL
SEDIMENTATION RATE, ERYTHROCYTE: >130 MM (ref 0–10)
SEG NEUTROPHILS: 63 % (ref 36–65)
SEG NEUTROPHILS: 78 % (ref 36–65)
SEG NEUTROPHILS: 78 % (ref 36–65)
SEG NEUTROPHILS: 81 % (ref 36–65)
SEG NEUTROPHILS: 81 % (ref 36–65)
SEG NEUTROPHILS: 82 % (ref 36–65)
SEG NEUTROPHILS: 89 % (ref 36–65)
SEG NEUTROPHILS: 91 % (ref 36–66)
SEG NEUTROPHILS: 95 % (ref 36–66)
SEGMENTED NEUTROPHILS ABSOLUTE COUNT: 15.65 K/UL (ref 1.8–7.7)
SEGMENTED NEUTROPHILS ABSOLUTE COUNT: 19.22 K/UL (ref 1.5–8.1)
SEGMENTED NEUTROPHILS ABSOLUTE COUNT: 26.5 K/UL (ref 1.8–7.7)
SEGMENTED NEUTROPHILS ABSOLUTE COUNT: 3.83 K/UL (ref 1.5–8.1)
SEGMENTED NEUTROPHILS ABSOLUTE COUNT: 6.77 K/UL (ref 1.5–8.1)
SEGMENTED NEUTROPHILS ABSOLUTE COUNT: 6.77 K/UL (ref 1.5–8.1)
SEGMENTED NEUTROPHILS ABSOLUTE COUNT: 7.23 K/UL (ref 1.5–8.1)
SEGMENTED NEUTROPHILS ABSOLUTE COUNT: 7.41 K/UL (ref 1.5–8.1)
SEGMENTED NEUTROPHILS ABSOLUTE COUNT: 8 K/UL (ref 1.5–8.1)
SERUM IFX INTERP: NORMAL
SET RATE: ABNORMAL
SODIUM BLD-SCNC: 128 MMOL/L (ref 135–144)
SODIUM BLD-SCNC: 129 MMOL/L (ref 135–144)
SODIUM BLD-SCNC: 129 MMOL/L (ref 135–144)
SODIUM BLD-SCNC: 131 MMOL/L (ref 135–144)
SODIUM BLD-SCNC: 132 MMOL/L (ref 135–144)
SODIUM BLD-SCNC: 133 MMOL/L (ref 135–144)
SODIUM BLD-SCNC: 134 MMOL/L (ref 135–144)
SODIUM BLD-SCNC: 135 MMOL/L (ref 135–144)
SODIUM BLD-SCNC: 135 MMOL/L (ref 135–144)
SODIUM BLD-SCNC: 136 MMOL/L (ref 135–144)
SODIUM BLD-SCNC: 136 MMOL/L (ref 135–144)
SODIUM BLD-SCNC: 143 MMOL/L (ref 135–144)
SODIUM BLD-SCNC: 145 MMOL/L (ref 135–144)
SODIUM BLD-SCNC: 146 MMOL/L (ref 135–144)
SPECIFIC GRAVITY UA: 1.02 (ref 1–1.03)
SPECIMEN DESCRIPTION: ABNORMAL
SPECIMEN DESCRIPTION: NORMAL
TEXT FOR RESPIRATORY: ABNORMAL
TOTAL CK: 71 U/L (ref 39–308)
TOTAL HB: ABNORMAL G/DL (ref 12–16)
TOTAL IRON BINDING CAPACITY: 128 UG/DL (ref 250–450)
TOTAL IRON BINDING CAPACITY: 295 UG/DL (ref 250–450)
TOTAL PROT. SUM,%: 100 % (ref 98–102)
TOTAL PROT. SUM: 5.6 G/DL (ref 6.3–8.2)
TOTAL PROTEIN, URINE: 13 MG/DL
TOTAL PROTEIN: 5.7 G/DL (ref 6.4–8.3)
TOTAL PROTEIN: 6.4 G/DL (ref 6.4–8.3)
TOTAL PROTEIN: 6.5 G/DL (ref 6.4–8.3)
TOTAL PROTEIN: 6.9 G/DL (ref 6.4–8.3)
TOTAL PROTEIN: 8.1 G/DL (ref 6.4–8.3)
TOTAL RATE: ABNORMAL
TRANSFUSION STATUS: NORMAL
TRICHOMONAS: ABNORMAL
TRIGLYCERIDE, FASTING: 399 MG/DL
TROPONIN INTERP: ABNORMAL
TROPONIN T: ABNORMAL NG/ML
TROPONIN, HIGH SENSITIVITY: 100 NG/L (ref 0–22)
TROPONIN, HIGH SENSITIVITY: 104 NG/L (ref 0–22)
TROPONIN, HIGH SENSITIVITY: 27 NG/L (ref 0–22)
TROPONIN, HIGH SENSITIVITY: 34 NG/L (ref 0–22)
TROPONIN, HIGH SENSITIVITY: 39 NG/L (ref 0–22)
TSH SERPL DL<=0.05 MIU/L-ACNC: 1.22 MIU/L (ref 0.3–5)
TSH SERPL DL<=0.05 MIU/L-ACNC: 1.87 MIU/L (ref 0.3–5)
TSH SERPL DL<=0.05 MIU/L-ACNC: 1.93 MIU/L (ref 0.3–5)
TURBIDITY: CLEAR
UNIT DIVISION: 0
UNIT NUMBER: NORMAL
UNSATURATED IRON BINDING CAPACITY: 110 UG/DL (ref 112–347)
UNSATURATED IRON BINDING CAPACITY: 222 UG/DL (ref 112–347)
URINE HGB: ABNORMAL
UROBILINOGEN, URINE: NORMAL
VANCOMYCIN TROUGH DATE LAST DOSE: ABNORMAL
VANCOMYCIN TROUGH DATE LAST DOSE: ABNORMAL
VANCOMYCIN TROUGH DATE LAST DOSE: NORMAL
VANCOMYCIN TROUGH DOSE AMOUNT: ABNORMAL
VANCOMYCIN TROUGH DOSE AMOUNT: ABNORMAL
VANCOMYCIN TROUGH DOSE AMOUNT: NORMAL
VANCOMYCIN TROUGH TIME LAST DOSE: ABNORMAL
VANCOMYCIN TROUGH TIME LAST DOSE: ABNORMAL
VANCOMYCIN TROUGH TIME LAST DOSE: NORMAL
VANCOMYCIN TROUGH: 19.8 UG/ML (ref 10–20)
VANCOMYCIN TROUGH: 7.3 UG/ML (ref 10–20)
VANCOMYCIN TROUGH: 7.9 UG/ML (ref 10–20)
VITAMIN D 25-HYDROXY: 10.3 NG/ML (ref 30–100)
VLDLC SERPL CALC-MCNC: ABNORMAL MG/DL (ref 1–30)
VT: ABNORMAL
WBC # BLD: 17.2 K/UL (ref 3.5–11.3)
WBC # BLD: 21.7 K/UL (ref 3.5–11.3)
WBC # BLD: 27.9 K/UL (ref 3.5–11.3)
WBC # BLD: 6.1 K/UL (ref 3.5–11.3)
WBC # BLD: 8.7 K/UL (ref 3.5–11.3)
WBC # BLD: 8.7 K/UL (ref 3.5–11.3)
WBC # BLD: 8.9 K/UL (ref 3.5–11.3)
WBC # BLD: 9 K/UL (ref 3.5–11.3)
WBC # BLD: 9.9 K/UL (ref 3.5–11.3)
WBC # BLD: ABNORMAL 10*3/UL
WBC UA: ABNORMAL /HPF (ref 0–5)
YEAST: ABNORMAL

## 2019-01-01 PROCEDURE — 2580000003 HC RX 258: Performed by: STUDENT IN AN ORGANIZED HEALTH CARE EDUCATION/TRAINING PROGRAM

## 2019-01-01 PROCEDURE — 6370000000 HC RX 637 (ALT 250 FOR IP): Performed by: INTERNAL MEDICINE

## 2019-01-01 PROCEDURE — 2580000003 HC RX 258: Performed by: EMERGENCY MEDICINE

## 2019-01-01 PROCEDURE — 2580000003 HC RX 258: Performed by: INTERNAL MEDICINE

## 2019-01-01 PROCEDURE — 4040F PNEUMOC VAC/ADMIN/RCVD: CPT | Performed by: PODIATRIST

## 2019-01-01 PROCEDURE — 85025 COMPLETE CBC W/AUTO DIFF WBC: CPT

## 2019-01-01 PROCEDURE — 2060000000 HC ICU INTERMEDIATE R&B

## 2019-01-01 PROCEDURE — 83735 ASSAY OF MAGNESIUM: CPT

## 2019-01-01 PROCEDURE — 2500000003 HC RX 250 WO HCPCS: Performed by: STUDENT IN AN ORGANIZED HEALTH CARE EDUCATION/TRAINING PROGRAM

## 2019-01-01 PROCEDURE — 6360000002 HC RX W HCPCS: Performed by: EMERGENCY MEDICINE

## 2019-01-01 PROCEDURE — 99233 SBSQ HOSP IP/OBS HIGH 50: CPT | Performed by: INTERNAL MEDICINE

## 2019-01-01 PROCEDURE — G8427 DOCREV CUR MEDS BY ELIG CLIN: HCPCS | Performed by: INTERNAL MEDICINE

## 2019-01-01 PROCEDURE — 80048 BASIC METABOLIC PNL TOTAL CA: CPT

## 2019-01-01 PROCEDURE — 6370000000 HC RX 637 (ALT 250 FOR IP): Performed by: STUDENT IN AN ORGANIZED HEALTH CARE EDUCATION/TRAINING PROGRAM

## 2019-01-01 PROCEDURE — 82010 KETONE BODYS QUAN: CPT

## 2019-01-01 PROCEDURE — 86403 PARTICLE AGGLUT ANTBDY SCRN: CPT

## 2019-01-01 PROCEDURE — 87186 SC STD MICRODIL/AGAR DIL: CPT

## 2019-01-01 PROCEDURE — 0JBR0ZZ EXCISION OF LEFT FOOT SUBCUTANEOUS TISSUE AND FASCIA, OPEN APPROACH: ICD-10-PCS | Performed by: PODIATRIST

## 2019-01-01 PROCEDURE — 36430 TRANSFUSION BLD/BLD COMPNT: CPT

## 2019-01-01 PROCEDURE — 82947 ASSAY GLUCOSE BLOOD QUANT: CPT

## 2019-01-01 PROCEDURE — 87040 BLOOD CULTURE FOR BACTERIA: CPT

## 2019-01-01 PROCEDURE — 6360000002 HC RX W HCPCS: Performed by: INTERNAL MEDICINE

## 2019-01-01 PROCEDURE — C9113 INJ PANTOPRAZOLE SODIUM, VIA: HCPCS | Performed by: INTERNAL MEDICINE

## 2019-01-01 PROCEDURE — G8417 CALC BMI ABV UP PARAM F/U: HCPCS | Performed by: PODIATRIST

## 2019-01-01 PROCEDURE — 97110 THERAPEUTIC EXERCISES: CPT

## 2019-01-01 PROCEDURE — 85014 HEMATOCRIT: CPT

## 2019-01-01 PROCEDURE — 6360000002 HC RX W HCPCS: Performed by: STUDENT IN AN ORGANIZED HEALTH CARE EDUCATION/TRAINING PROGRAM

## 2019-01-01 PROCEDURE — 6370000000 HC RX 637 (ALT 250 FOR IP): Performed by: NURSE PRACTITIONER

## 2019-01-01 PROCEDURE — 99223 1ST HOSP IP/OBS HIGH 75: CPT | Performed by: NURSE PRACTITIONER

## 2019-01-01 PROCEDURE — 84100 ASSAY OF PHOSPHORUS: CPT

## 2019-01-01 PROCEDURE — 0DJ08ZZ INSPECTION OF UPPER INTESTINAL TRACT, VIA NATURAL OR ARTIFICIAL OPENING ENDOSCOPIC: ICD-10-PCS | Performed by: INTERNAL MEDICINE

## 2019-01-01 PROCEDURE — 93005 ELECTROCARDIOGRAM TRACING: CPT | Performed by: STUDENT IN AN ORGANIZED HEALTH CARE EDUCATION/TRAINING PROGRAM

## 2019-01-01 PROCEDURE — 6360000002 HC RX W HCPCS: Performed by: NURSE PRACTITIONER

## 2019-01-01 PROCEDURE — 83883 ASSAY NEPHELOMETRY NOT SPEC: CPT

## 2019-01-01 PROCEDURE — 36415 COLL VENOUS BLD VENIPUNCTURE: CPT

## 2019-01-01 PROCEDURE — 85730 THROMBOPLASTIN TIME PARTIAL: CPT

## 2019-01-01 PROCEDURE — 2500000003 HC RX 250 WO HCPCS: Performed by: EMERGENCY MEDICINE

## 2019-01-01 PROCEDURE — 99232 SBSQ HOSP IP/OBS MODERATE 35: CPT | Performed by: INTERNAL MEDICINE

## 2019-01-01 PROCEDURE — 11721 DEBRIDE NAIL 6 OR MORE: CPT | Performed by: PODIATRIST

## 2019-01-01 PROCEDURE — 99999 PR OFFICE/OUTPT VISIT,PROCEDURE ONLY: CPT | Performed by: PODIATRIST

## 2019-01-01 PROCEDURE — 2500000003 HC RX 250 WO HCPCS: Performed by: PODIATRIST

## 2019-01-01 PROCEDURE — 87641 MR-STAPH DNA AMP PROBE: CPT

## 2019-01-01 PROCEDURE — 85651 RBC SED RATE NONAUTOMATED: CPT

## 2019-01-01 PROCEDURE — 6360000002 HC RX W HCPCS: Performed by: HOSPITALIST

## 2019-01-01 PROCEDURE — 2709999900 HC NON-CHARGEABLE SUPPLY

## 2019-01-01 PROCEDURE — 82306 VITAMIN D 25 HYDROXY: CPT

## 2019-01-01 PROCEDURE — 82805 BLOOD GASES W/O2 SATURATION: CPT

## 2019-01-01 PROCEDURE — 99214 OFFICE O/P EST MOD 30 MIN: CPT | Performed by: INTERNAL MEDICINE

## 2019-01-01 PROCEDURE — 02HV33Z INSERTION OF INFUSION DEVICE INTO SUPERIOR VENA CAVA, PERCUTANEOUS APPROACH: ICD-10-PCS | Performed by: RADIOLOGY

## 2019-01-01 PROCEDURE — 3017F COLORECTAL CA SCREEN DOC REV: CPT | Performed by: PODIATRIST

## 2019-01-01 PROCEDURE — 3046F HEMOGLOBIN A1C LEVEL >9.0%: CPT | Performed by: PODIATRIST

## 2019-01-01 PROCEDURE — 3046F HEMOGLOBIN A1C LEVEL >9.0%: CPT | Performed by: INTERNAL MEDICINE

## 2019-01-01 PROCEDURE — 1036F TOBACCO NON-USER: CPT | Performed by: PODIATRIST

## 2019-01-01 PROCEDURE — 11043 DBRDMT MUSC&/FSCA 1ST 20/<: CPT | Performed by: PODIATRIST

## 2019-01-01 PROCEDURE — 99291 CRITICAL CARE FIRST HOUR: CPT | Performed by: INTERNAL MEDICINE

## 2019-01-01 PROCEDURE — 51798 US URINE CAPACITY MEASURE: CPT

## 2019-01-01 PROCEDURE — 85610 PROTHROMBIN TIME: CPT

## 2019-01-01 PROCEDURE — P9016 RBC LEUKOCYTES REDUCED: HCPCS

## 2019-01-01 PROCEDURE — 84484 ASSAY OF TROPONIN QUANT: CPT

## 2019-01-01 PROCEDURE — 37799 UNLISTED PX VASCULAR SURGERY: CPT

## 2019-01-01 PROCEDURE — 83605 ASSAY OF LACTIC ACID: CPT

## 2019-01-01 PROCEDURE — 11042 DBRDMT SUBQ TIS 1ST 20SQCM/<: CPT | Performed by: PODIATRIST

## 2019-01-01 PROCEDURE — 82728 ASSAY OF FERRITIN: CPT

## 2019-01-01 PROCEDURE — 2500000003 HC RX 250 WO HCPCS: Performed by: NURSE ANESTHETIST, CERTIFIED REGISTERED

## 2019-01-01 PROCEDURE — 2580000003 HC RX 258: Performed by: PHYSICIAN ASSISTANT

## 2019-01-01 PROCEDURE — C9113 INJ PANTOPRAZOLE SODIUM, VIA: HCPCS | Performed by: HOSPITALIST

## 2019-01-01 PROCEDURE — 87086 URINE CULTURE/COLONY COUNT: CPT

## 2019-01-01 PROCEDURE — 0J9R0ZZ DRAINAGE OF LEFT FOOT SUBCUTANEOUS TISSUE AND FASCIA, OPEN APPROACH: ICD-10-PCS | Performed by: PODIATRIST

## 2019-01-01 PROCEDURE — 93306 TTE W/DOPPLER COMPLETE: CPT

## 2019-01-01 PROCEDURE — G8427 DOCREV CUR MEDS BY ELIG CLIN: HCPCS | Performed by: PODIATRIST

## 2019-01-01 PROCEDURE — 87205 SMEAR GRAM STAIN: CPT

## 2019-01-01 PROCEDURE — 99213 OFFICE O/P EST LOW 20 MIN: CPT | Performed by: PODIATRIST

## 2019-01-01 PROCEDURE — 84443 ASSAY THYROID STIM HORMONE: CPT

## 2019-01-01 PROCEDURE — G8417 CALC BMI ABV UP PARAM F/U: HCPCS | Performed by: INTERNAL MEDICINE

## 2019-01-01 PROCEDURE — 93005 ELECTROCARDIOGRAM TRACING: CPT

## 2019-01-01 PROCEDURE — 2000000000 HC ICU R&B

## 2019-01-01 PROCEDURE — 85018 HEMOGLOBIN: CPT

## 2019-01-01 PROCEDURE — APPNB45 APP NON BILLABLE 31-45 MINUTES: Performed by: INTERNAL MEDICINE

## 2019-01-01 PROCEDURE — 84165 PROTEIN E-PHORESIS SERUM: CPT

## 2019-01-01 PROCEDURE — 82550 ASSAY OF CK (CPK): CPT

## 2019-01-01 PROCEDURE — 2580000003 HC RX 258: Performed by: NURSE ANESTHETIST, CERTIFIED REGISTERED

## 2019-01-01 PROCEDURE — 84156 ASSAY OF PROTEIN URINE: CPT

## 2019-01-01 PROCEDURE — 1101F PT FALLS ASSESS-DOCD LE1/YR: CPT | Performed by: INTERNAL MEDICINE

## 2019-01-01 PROCEDURE — 6360000002 HC RX W HCPCS: Performed by: NURSE ANESTHETIST, CERTIFIED REGISTERED

## 2019-01-01 PROCEDURE — 1123F ACP DISCUSS/DSCN MKR DOCD: CPT | Performed by: PODIATRIST

## 2019-01-01 PROCEDURE — 93005 ELECTROCARDIOGRAM TRACING: CPT | Performed by: EMERGENCY MEDICINE

## 2019-01-01 PROCEDURE — 1101F PT FALLS ASSESS-DOCD LE1/YR: CPT | Performed by: PODIATRIST

## 2019-01-01 PROCEDURE — 6370000000 HC RX 637 (ALT 250 FOR IP): Performed by: EMERGENCY MEDICINE

## 2019-01-01 PROCEDURE — 83036 HEMOGLOBIN GLYCOSYLATED A1C: CPT | Performed by: INTERNAL MEDICINE

## 2019-01-01 PROCEDURE — 71045 X-RAY EXAM CHEST 1 VIEW: CPT

## 2019-01-01 PROCEDURE — 80076 HEPATIC FUNCTION PANEL: CPT

## 2019-01-01 PROCEDURE — 97597 DBRDMT OPN WND 1ST 20 CM/<: CPT | Performed by: PODIATRIST

## 2019-01-01 PROCEDURE — 73630 X-RAY EXAM OF FOOT: CPT

## 2019-01-01 PROCEDURE — 2709999900 HC NON-CHARGEABLE SUPPLY: Performed by: PODIATRIST

## 2019-01-01 PROCEDURE — 2580000003 HC RX 258: Performed by: HOSPITALIST

## 2019-01-01 PROCEDURE — 2500000003 HC RX 250 WO HCPCS: Performed by: INTERNAL MEDICINE

## 2019-01-01 PROCEDURE — 1036F TOBACCO NON-USER: CPT | Performed by: INTERNAL MEDICINE

## 2019-01-01 PROCEDURE — 80202 ASSAY OF VANCOMYCIN: CPT

## 2019-01-01 PROCEDURE — 36620 INSERTION CATHETER ARTERY: CPT

## 2019-01-01 PROCEDURE — 43235 EGD DIAGNOSTIC BRUSH WASH: CPT | Performed by: INTERNAL MEDICINE

## 2019-01-01 PROCEDURE — G8484 FLU IMMUNIZE NO ADMIN: HCPCS | Performed by: INTERNAL MEDICINE

## 2019-01-01 PROCEDURE — G8484 FLU IMMUNIZE NO ADMIN: HCPCS | Performed by: PODIATRIST

## 2019-01-01 PROCEDURE — 7100000001 HC PACU RECOVERY - ADDTL 15 MIN: Performed by: PODIATRIST

## 2019-01-01 PROCEDURE — 2022F DILAT RTA XM EVC RTNOPTHY: CPT | Performed by: INTERNAL MEDICINE

## 2019-01-01 PROCEDURE — 3700000000 HC ANESTHESIA ATTENDED CARE: Performed by: PODIATRIST

## 2019-01-01 PROCEDURE — 51702 INSERT TEMP BLADDER CATH: CPT

## 2019-01-01 PROCEDURE — 97535 SELF CARE MNGMENT TRAINING: CPT

## 2019-01-01 PROCEDURE — 73590 X-RAY EXAM OF LOWER LEG: CPT

## 2019-01-01 PROCEDURE — 7100000001 HC PACU RECOVERY - ADDTL 15 MIN: Performed by: INTERNAL MEDICINE

## 2019-01-01 PROCEDURE — 3700000001 HC ADD 15 MINUTES (ANESTHESIA): Performed by: INTERNAL MEDICINE

## 2019-01-01 PROCEDURE — 2580000003 HC RX 258: Performed by: PODIATRIST

## 2019-01-01 PROCEDURE — 96372 THER/PROPH/DIAG INJ SC/IM: CPT

## 2019-01-01 PROCEDURE — 82570 ASSAY OF URINE CREATININE: CPT

## 2019-01-01 PROCEDURE — 1123F ACP DISCUSS/DSCN MKR DOCD: CPT | Performed by: INTERNAL MEDICINE

## 2019-01-01 PROCEDURE — 83880 ASSAY OF NATRIURETIC PEPTIDE: CPT

## 2019-01-01 PROCEDURE — 31720 CLEARANCE OF AIRWAYS: CPT

## 2019-01-01 PROCEDURE — 86850 RBC ANTIBODY SCREEN: CPT

## 2019-01-01 PROCEDURE — 86140 C-REACTIVE PROTEIN: CPT

## 2019-01-01 PROCEDURE — 3700000000 HC ANESTHESIA ATTENDED CARE: Performed by: INTERNAL MEDICINE

## 2019-01-01 PROCEDURE — 94760 N-INVAS EAR/PLS OXIMETRY 1: CPT

## 2019-01-01 PROCEDURE — 84155 ASSAY OF PROTEIN SERUM: CPT

## 2019-01-01 PROCEDURE — C1751 CATH, INF, PER/CENT/MIDLINE: HCPCS

## 2019-01-01 PROCEDURE — 81001 URINALYSIS AUTO W/SCOPE: CPT

## 2019-01-01 PROCEDURE — 2022F DILAT RTA XM EVC RTNOPTHY: CPT | Performed by: PODIATRIST

## 2019-01-01 PROCEDURE — 7100000000 HC PACU RECOVERY - FIRST 15 MIN: Performed by: PODIATRIST

## 2019-01-01 PROCEDURE — 6370000000 HC RX 637 (ALT 250 FOR IP): Performed by: HOSPITALIST

## 2019-01-01 PROCEDURE — 4040F PNEUMOC VAC/ADMIN/RCVD: CPT | Performed by: INTERNAL MEDICINE

## 2019-01-01 PROCEDURE — 1111F DSCHRG MED/CURRENT MED MERGE: CPT | Performed by: PODIATRIST

## 2019-01-01 PROCEDURE — 3609012800 HC EGD DIAGNOSTIC ONLY: Performed by: INTERNAL MEDICINE

## 2019-01-01 PROCEDURE — 85045 AUTOMATED RETICULOCYTE COUNT: CPT

## 2019-01-01 PROCEDURE — 94761 N-INVAS EAR/PLS OXIMETRY MLT: CPT

## 2019-01-01 PROCEDURE — 36573 INSJ PICC RS&I 5 YR+: CPT | Performed by: RADIOLOGY

## 2019-01-01 PROCEDURE — 76770 US EXAM ABDO BACK WALL COMP: CPT

## 2019-01-01 PROCEDURE — 3700000001 HC ADD 15 MINUTES (ANESTHESIA): Performed by: PODIATRIST

## 2019-01-01 PROCEDURE — 97530 THERAPEUTIC ACTIVITIES: CPT

## 2019-01-01 PROCEDURE — 97162 PT EVAL MOD COMPLEX 30 MIN: CPT

## 2019-01-01 PROCEDURE — 86920 COMPATIBILITY TEST SPIN: CPT

## 2019-01-01 PROCEDURE — 87075 CULTR BACTERIA EXCEPT BLOOD: CPT

## 2019-01-01 PROCEDURE — 93923 UPR/LXTR ART STDY 3+ LVLS: CPT

## 2019-01-01 PROCEDURE — 96367 TX/PROPH/DG ADDL SEQ IV INF: CPT

## 2019-01-01 PROCEDURE — 1200000000 HC SEMI PRIVATE

## 2019-01-01 PROCEDURE — 82140 ASSAY OF AMMONIA: CPT

## 2019-01-01 PROCEDURE — G8482 FLU IMMUNIZE ORDER/ADMIN: HCPCS | Performed by: INTERNAL MEDICINE

## 2019-01-01 PROCEDURE — 3600000013 HC SURGERY LEVEL 3 ADDTL 15MIN: Performed by: PODIATRIST

## 2019-01-01 PROCEDURE — 11046 DBRDMT MUSC&/FSCA EA ADDL: CPT | Performed by: PODIATRIST

## 2019-01-01 PROCEDURE — C9113 INJ PANTOPRAZOLE SODIUM, VIA: HCPCS | Performed by: EMERGENCY MEDICINE

## 2019-01-01 PROCEDURE — 87389 HIV-1 AG W/HIV-1&-2 AB AG IA: CPT

## 2019-01-01 PROCEDURE — 6360000004 HC RX CONTRAST MEDICATION: Performed by: INTERNAL MEDICINE

## 2019-01-01 PROCEDURE — 83874 ASSAY OF MYOGLOBIN: CPT

## 2019-01-01 PROCEDURE — 99285 EMERGENCY DEPT VISIT HI MDM: CPT

## 2019-01-01 PROCEDURE — 99222 1ST HOSP IP/OBS MODERATE 55: CPT | Performed by: INTERNAL MEDICINE

## 2019-01-01 PROCEDURE — 97598 DBRDMT OPN WND ADDL 20CM/<: CPT | Performed by: PODIATRIST

## 2019-01-01 PROCEDURE — 87070 CULTURE OTHR SPECIMN AEROBIC: CPT

## 2019-01-01 PROCEDURE — 83690 ASSAY OF LIPASE: CPT

## 2019-01-01 PROCEDURE — 86901 BLOOD TYPING SEROLOGIC RH(D): CPT

## 2019-01-01 PROCEDURE — 99233 SBSQ HOSP IP/OBS HIGH 50: CPT | Performed by: PODIATRIST

## 2019-01-01 PROCEDURE — 96365 THER/PROPH/DIAG IV INF INIT: CPT

## 2019-01-01 PROCEDURE — 97166 OT EVAL MOD COMPLEX 45 MIN: CPT

## 2019-01-01 PROCEDURE — A9579 GAD-BASE MR CONTRAST NOS,1ML: HCPCS | Performed by: INTERNAL MEDICINE

## 2019-01-01 PROCEDURE — 2500000003 HC RX 250 WO HCPCS: Performed by: HOSPITALIST

## 2019-01-01 PROCEDURE — 86334 IMMUNOFIX E-PHORESIS SERUM: CPT

## 2019-01-01 PROCEDURE — C1894 INTRO/SHEATH, NON-LASER: HCPCS

## 2019-01-01 PROCEDURE — 86160 COMPLEMENT ANTIGEN: CPT

## 2019-01-01 PROCEDURE — 86038 ANTINUCLEAR ANTIBODIES: CPT

## 2019-01-01 PROCEDURE — 87077 CULTURE AEROBIC IDENTIFY: CPT

## 2019-01-01 PROCEDURE — 80053 COMPREHEN METABOLIC PANEL: CPT

## 2019-01-01 PROCEDURE — 83540 ASSAY OF IRON: CPT

## 2019-01-01 PROCEDURE — 96375 TX/PRO/DX INJ NEW DRUG ADDON: CPT

## 2019-01-01 PROCEDURE — 3017F COLORECTAL CA SCREEN DOC REV: CPT | Performed by: INTERNAL MEDICINE

## 2019-01-01 PROCEDURE — 6360000002 HC RX W HCPCS

## 2019-01-01 PROCEDURE — 3600000003 HC SURGERY LEVEL 3 BASE: Performed by: PODIATRIST

## 2019-01-01 PROCEDURE — 99203 OFFICE O/P NEW LOW 30 MIN: CPT | Performed by: PODIATRIST

## 2019-01-01 PROCEDURE — 86900 BLOOD TYPING SEROLOGIC ABO: CPT

## 2019-01-01 PROCEDURE — 7100000000 HC PACU RECOVERY - FIRST 15 MIN: Performed by: INTERNAL MEDICINE

## 2019-01-01 PROCEDURE — 0QBM0ZZ EXCISION OF LEFT TARSAL, OPEN APPROACH: ICD-10-PCS | Performed by: PODIATRIST

## 2019-01-01 PROCEDURE — 83550 IRON BINDING TEST: CPT

## 2019-01-01 PROCEDURE — 73720 MRI LWR EXTREMITY W/O&W/DYE: CPT

## 2019-01-01 RX ORDER — SENNA PLUS 8.6 MG/1
1 TABLET ORAL DAILY PRN
Status: DISCONTINUED | OUTPATIENT
Start: 2019-01-01 | End: 2019-01-01 | Stop reason: HOSPADM

## 2019-01-01 RX ORDER — DEXTROSE MONOHYDRATE 50 MG/ML
100 INJECTION, SOLUTION INTRAVENOUS PRN
Status: DISCONTINUED | OUTPATIENT
Start: 2019-01-01 | End: 2019-01-01 | Stop reason: HOSPADM

## 2019-01-01 RX ORDER — FERROUS SULFATE 325(65) MG
325 TABLET ORAL 2 TIMES DAILY
Qty: 60 TABLET | Refills: 3 | Status: SHIPPED | OUTPATIENT
Start: 2019-01-01

## 2019-01-01 RX ORDER — GLUCOSAMINE HCL/CHONDROITIN SU 500-400 MG
CAPSULE ORAL
Qty: 50 STRIP | Refills: 6 | Status: SHIPPED | OUTPATIENT
Start: 2019-01-01 | End: 2020-03-26

## 2019-01-01 RX ORDER — ACETAMINOPHEN 650 MG
TABLET, EXTENDED RELEASE ORAL
Status: COMPLETED
Start: 2019-01-01 | End: 2019-01-01

## 2019-01-01 RX ORDER — POTASSIUM CHLORIDE 7.45 MG/ML
10 INJECTION INTRAVENOUS PRN
Status: DISCONTINUED | OUTPATIENT
Start: 2019-01-01 | End: 2019-01-01

## 2019-01-01 RX ORDER — FLUCONAZOLE 100 MG/1
100 TABLET ORAL DAILY
Qty: 2 TABLET | Refills: 0 | Status: SHIPPED | OUTPATIENT
Start: 2019-01-01 | End: 2019-01-01

## 2019-01-01 RX ORDER — QUETIAPINE FUMARATE 300 MG/1
300 TABLET, FILM COATED ORAL NIGHTLY
Status: DISCONTINUED | OUTPATIENT
Start: 2019-01-01 | End: 2019-01-01 | Stop reason: HOSPADM

## 2019-01-01 RX ORDER — INSULIN GLARGINE 100 [IU]/ML
40 INJECTION, SOLUTION SUBCUTANEOUS 2 TIMES DAILY
Status: DISCONTINUED | OUTPATIENT
Start: 2019-01-01 | End: 2019-01-01

## 2019-01-01 RX ORDER — CIPROFLOXACIN 500 MG/1
500 TABLET, FILM COATED ORAL 2 TIMES DAILY
Qty: 68 TABLET | Refills: 0 | Status: SHIPPED | OUTPATIENT
Start: 2019-01-01 | End: 2019-01-01

## 2019-01-01 RX ORDER — GLYCOPYRROLATE 1 MG/5 ML
SYRINGE (ML) INTRAVENOUS PRN
Status: DISCONTINUED | OUTPATIENT
Start: 2019-01-01 | End: 2019-01-01 | Stop reason: SDUPTHER

## 2019-01-01 RX ORDER — PROPOFOL 10 MG/ML
INJECTION, EMULSION INTRAVENOUS PRN
Status: DISCONTINUED | OUTPATIENT
Start: 2019-01-01 | End: 2019-01-01 | Stop reason: SDUPTHER

## 2019-01-01 RX ORDER — FLASH GLUCOSE SENSOR
KIT MISCELLANEOUS
Qty: 2 EACH | Refills: 5 | Status: SHIPPED | OUTPATIENT
Start: 2019-01-01

## 2019-01-01 RX ORDER — SODIUM CHLORIDE 0.9 % (FLUSH) 0.9 %
10 SYRINGE (ML) INJECTION PRN
Status: DISCONTINUED | OUTPATIENT
Start: 2019-01-01 | End: 2019-01-01 | Stop reason: HOSPADM

## 2019-01-01 RX ORDER — SUCRALFATE 1 G/1
1 TABLET ORAL 4 TIMES DAILY
Qty: 120 TABLET | Refills: 0 | Status: SHIPPED | OUTPATIENT
Start: 2019-01-01

## 2019-01-01 RX ORDER — LISINOPRIL 5 MG/1
5 TABLET ORAL DAILY
Qty: 30 TABLET | Refills: 5 | Status: SHIPPED | OUTPATIENT
Start: 2019-01-01

## 2019-01-01 RX ORDER — SODIUM CHLORIDE, SODIUM LACTATE, POTASSIUM CHLORIDE, CALCIUM CHLORIDE 600; 310; 30; 20 MG/100ML; MG/100ML; MG/100ML; MG/100ML
INJECTION, SOLUTION INTRAVENOUS CONTINUOUS PRN
Status: DISCONTINUED | OUTPATIENT
Start: 2019-01-01 | End: 2019-01-01 | Stop reason: SDUPTHER

## 2019-01-01 RX ORDER — PANTOPRAZOLE SODIUM 40 MG/10ML
40 INJECTION, POWDER, LYOPHILIZED, FOR SOLUTION INTRAVENOUS 2 TIMES DAILY
Status: DISCONTINUED | OUTPATIENT
Start: 2019-01-01 | End: 2019-01-01

## 2019-01-01 RX ORDER — SODIUM CHLORIDE 9 MG/ML
INJECTION, SOLUTION INTRAVENOUS CONTINUOUS
Status: DISCONTINUED | OUTPATIENT
Start: 2019-01-01 | End: 2019-01-01

## 2019-01-01 RX ORDER — METRONIDAZOLE 500 MG/1
500 TABLET ORAL EVERY 8 HOURS SCHEDULED
Status: DISCONTINUED | OUTPATIENT
Start: 2019-01-01 | End: 2019-01-01 | Stop reason: HOSPADM

## 2019-01-01 RX ORDER — QUETIAPINE FUMARATE 300 MG/1
300 TABLET, FILM COATED ORAL DAILY
Status: DISCONTINUED | OUTPATIENT
Start: 2019-01-01 | End: 2019-01-01

## 2019-01-01 RX ORDER — DEXTROSE MONOHYDRATE 25 G/50ML
12.5 INJECTION, SOLUTION INTRAVENOUS PRN
Status: DISCONTINUED | OUTPATIENT
Start: 2019-01-01 | End: 2019-01-01 | Stop reason: HOSPADM

## 2019-01-01 RX ORDER — MAGNESIUM SULFATE 1 G/100ML
1 INJECTION INTRAVENOUS ONCE
Status: COMPLETED | OUTPATIENT
Start: 2019-01-01 | End: 2019-01-01

## 2019-01-01 RX ORDER — 0.9 % SODIUM CHLORIDE 0.9 %
1000 INTRAVENOUS SOLUTION INTRAVENOUS ONCE
Status: COMPLETED | OUTPATIENT
Start: 2019-01-01 | End: 2019-01-01

## 2019-01-01 RX ORDER — GAUZE BANDAGE 3"X4"
SPONGE TOPICAL
Qty: 12 EACH | Refills: 1 | Status: SHIPPED | OUTPATIENT
Start: 2019-01-01

## 2019-01-01 RX ORDER — PANTOPRAZOLE SODIUM 40 MG/1
TABLET, DELAYED RELEASE ORAL
Qty: 150 TABLET | Refills: 0 | Status: SHIPPED | OUTPATIENT
Start: 2019-01-01 | End: 2019-09-29

## 2019-01-01 RX ORDER — MAGNESIUM SULFATE 1 G/100ML
2 INJECTION INTRAVENOUS ONCE
Status: COMPLETED | OUTPATIENT
Start: 2019-01-01 | End: 2019-01-01

## 2019-01-01 RX ORDER — 0.9 % SODIUM CHLORIDE 0.9 %
250 INTRAVENOUS SOLUTION INTRAVENOUS ONCE
Status: DISCONTINUED | OUTPATIENT
Start: 2019-01-01 | End: 2019-01-01 | Stop reason: HOSPADM

## 2019-01-01 RX ORDER — SOFT LENS RINSE,STORE SOLUTION
1 SOLUTION, NON-ORAL MISCELLANEOUS 2 TIMES DAILY
Qty: 1 BOTTLE | Refills: 2 | Status: SHIPPED | OUTPATIENT
Start: 2019-01-01 | End: 2019-01-01 | Stop reason: SDUPTHER

## 2019-01-01 RX ORDER — CIPROFLOXACIN 500 MG/1
500 TABLET, FILM COATED ORAL EVERY 12 HOURS SCHEDULED
Status: DISCONTINUED | OUTPATIENT
Start: 2019-01-01 | End: 2019-01-01 | Stop reason: HOSPADM

## 2019-01-01 RX ORDER — METOCLOPRAMIDE HYDROCHLORIDE 5 MG/ML
10 INJECTION INTRAMUSCULAR; INTRAVENOUS ONCE
Status: COMPLETED | OUTPATIENT
Start: 2019-01-01 | End: 2019-01-01

## 2019-01-01 RX ORDER — VANCOMYCIN HYDROCHLORIDE 1 G/200ML
1000 INJECTION, SOLUTION INTRAVENOUS EVERY 12 HOURS
Status: DISCONTINUED | OUTPATIENT
Start: 2019-01-01 | End: 2019-01-01 | Stop reason: HOSPADM

## 2019-01-01 RX ORDER — FLASH GLUCOSE SCANNING READER
EACH MISCELLANEOUS
Qty: 2 DEVICE | Refills: 5 | Status: SHIPPED | OUTPATIENT
Start: 2019-01-01

## 2019-01-01 RX ORDER — SOFT LENS ADJUNCTIVE SOLUTIONS
AEROSOL (ML) MISCELLANEOUS
Qty: 1 BOTTLE | Refills: 1 | Status: SHIPPED | OUTPATIENT
Start: 2019-01-01

## 2019-01-01 RX ORDER — LORAZEPAM 0.5 MG/1
0.5 TABLET ORAL EVERY 4 HOURS PRN
COMMUNITY

## 2019-01-01 RX ORDER — PIPERACILLIN SODIUM, TAZOBACTAM SODIUM 3; .375 G/15ML; G/15ML
3.38 INJECTION, POWDER, LYOPHILIZED, FOR SOLUTION INTRAVENOUS EVERY 8 HOURS
Qty: 303.75 G | Refills: 1 | Status: SHIPPED | OUTPATIENT
Start: 2019-01-01 | End: 2019-01-01 | Stop reason: HOSPADM

## 2019-01-01 RX ORDER — SODIUM CHLORIDE 0.9 % (FLUSH) 0.9 %
10 SYRINGE (ML) INJECTION EVERY 12 HOURS SCHEDULED
Status: DISCONTINUED | OUTPATIENT
Start: 2019-01-01 | End: 2019-01-01 | Stop reason: HOSPADM

## 2019-01-01 RX ORDER — LABETALOL HYDROCHLORIDE 5 MG/ML
5 INJECTION, SOLUTION INTRAVENOUS EVERY 10 MIN PRN
Status: DISCONTINUED | OUTPATIENT
Start: 2019-01-01 | End: 2019-01-01 | Stop reason: HOSPADM

## 2019-01-01 RX ORDER — 0.9 % SODIUM CHLORIDE 0.9 %
10 VIAL (ML) INJECTION 2 TIMES DAILY
Status: DISCONTINUED | OUTPATIENT
Start: 2019-01-01 | End: 2019-01-01 | Stop reason: HOSPADM

## 2019-01-01 RX ORDER — FENTANYL CITRATE 50 UG/ML
INJECTION, SOLUTION INTRAMUSCULAR; INTRAVENOUS PRN
Status: DISCONTINUED | OUTPATIENT
Start: 2019-01-01 | End: 2019-01-01 | Stop reason: SDUPTHER

## 2019-01-01 RX ORDER — FENTANYL CITRATE 50 UG/ML
50 INJECTION, SOLUTION INTRAMUSCULAR; INTRAVENOUS ONCE
Status: DISCONTINUED | OUTPATIENT
Start: 2019-01-01 | End: 2019-01-01 | Stop reason: HOSPADM

## 2019-01-01 RX ORDER — GAUZE BANDAGE 4" X 4"
BANDAGE TOPICAL
Qty: 100 EACH | Refills: 1 | Status: SHIPPED | OUTPATIENT
Start: 2019-01-01

## 2019-01-01 RX ORDER — OMEGA-3S/DHA/EPA/FISH OIL/D3 300MG-1000
800 CAPSULE ORAL DAILY
Qty: 30 TABLET | Refills: 2 | Status: SHIPPED | OUTPATIENT
Start: 2019-01-01

## 2019-01-01 RX ORDER — LABETALOL HYDROCHLORIDE 5 MG/ML
10 INJECTION, SOLUTION INTRAVENOUS EVERY 6 HOURS PRN
Status: DISCONTINUED | OUTPATIENT
Start: 2019-01-01 | End: 2019-01-01 | Stop reason: HOSPADM

## 2019-01-01 RX ORDER — DICLOFENAC SODIUM 75 MG/1
75 TABLET, DELAYED RELEASE ORAL 2 TIMES DAILY WITH MEALS
Qty: 60 TABLET | Refills: 3 | Status: SHIPPED | OUTPATIENT
Start: 2019-01-01

## 2019-01-01 RX ORDER — MAGNESIUM SULFATE 1 G/100ML
1 INJECTION INTRAVENOUS PRN
Status: DISCONTINUED | OUTPATIENT
Start: 2019-01-01 | End: 2019-01-01

## 2019-01-01 RX ORDER — GABAPENTIN 100 MG/1
100 CAPSULE ORAL 3 TIMES DAILY
Status: DISCONTINUED | OUTPATIENT
Start: 2019-01-01 | End: 2019-01-01 | Stop reason: HOSPADM

## 2019-01-01 RX ORDER — INSULIN GLARGINE 100 [IU]/ML
30 INJECTION, SOLUTION SUBCUTANEOUS 2 TIMES DAILY
Status: DISCONTINUED | OUTPATIENT
Start: 2019-01-01 | End: 2019-01-01

## 2019-01-01 RX ORDER — LIDOCAINE HYDROCHLORIDE 10 MG/ML
INJECTION, SOLUTION EPIDURAL; INFILTRATION; INTRACAUDAL; PERINEURAL PRN
Status: DISCONTINUED | OUTPATIENT
Start: 2019-01-01 | End: 2019-01-01 | Stop reason: ALTCHOICE

## 2019-01-01 RX ORDER — HEPARIN SODIUM 5000 [USP'U]/ML
5000 INJECTION, SOLUTION INTRAVENOUS; SUBCUTANEOUS EVERY 8 HOURS SCHEDULED
Status: DISCONTINUED | OUTPATIENT
Start: 2019-01-01 | End: 2019-01-01 | Stop reason: HOSPADM

## 2019-01-01 RX ORDER — OXYCODONE HYDROCHLORIDE 5 MG/1
5 TABLET ORAL EVERY 4 HOURS PRN
Status: DISCONTINUED | OUTPATIENT
Start: 2019-01-01 | End: 2019-01-01 | Stop reason: HOSPADM

## 2019-01-01 RX ORDER — BLOOD PRESSURE TEST KIT
KIT MISCELLANEOUS
Qty: 5 EACH | Refills: 1 | Status: SHIPPED | OUTPATIENT
Start: 2019-01-01

## 2019-01-01 RX ORDER — PANTOPRAZOLE SODIUM 40 MG/1
40 TABLET, DELAYED RELEASE ORAL
Status: DISCONTINUED | OUTPATIENT
Start: 2019-01-01 | End: 2019-01-01 | Stop reason: HOSPADM

## 2019-01-01 RX ORDER — MORPHINE SULFATE 4 MG/ML
INJECTION, SOLUTION INTRAMUSCULAR; INTRAVENOUS
Status: COMPLETED
Start: 2019-01-01 | End: 2019-01-01

## 2019-01-01 RX ORDER — ATORVASTATIN CALCIUM 40 MG/1
40 TABLET, FILM COATED ORAL DAILY
Qty: 90 TABLET | Refills: 1 | Status: SHIPPED | OUTPATIENT
Start: 2019-01-01

## 2019-01-01 RX ORDER — VANCOMYCIN HYDROCHLORIDE 1 G/200ML
1000 INJECTION, SOLUTION INTRAVENOUS EVERY 24 HOURS
Status: DISCONTINUED | OUTPATIENT
Start: 2019-01-01 | End: 2019-01-01

## 2019-01-01 RX ORDER — 0.9 % SODIUM CHLORIDE 0.9 %
10 VIAL (ML) INJECTION 2 TIMES DAILY
Status: DISCONTINUED | OUTPATIENT
Start: 2019-01-01 | End: 2019-01-01

## 2019-01-01 RX ORDER — 0.9 % SODIUM CHLORIDE 0.9 %
250 INTRAVENOUS SOLUTION INTRAVENOUS ONCE
Status: COMPLETED | OUTPATIENT
Start: 2019-01-01 | End: 2019-01-01

## 2019-01-01 RX ORDER — INSULIN GLARGINE 100 [IU]/ML
40 INJECTION, SOLUTION SUBCUTANEOUS 2 TIMES DAILY
Status: DISCONTINUED | OUTPATIENT
Start: 2019-01-01 | End: 2019-01-01 | Stop reason: HOSPADM

## 2019-01-01 RX ORDER — OMEGA-3S/DHA/EPA/FISH OIL/D3 300MG-1000
800 CAPSULE ORAL DAILY
Status: DISCONTINUED | OUTPATIENT
Start: 2019-01-01 | End: 2019-01-01 | Stop reason: HOSPADM

## 2019-01-01 RX ORDER — TAMSULOSIN HYDROCHLORIDE 0.4 MG/1
0.4 CAPSULE ORAL DAILY
Status: DISCONTINUED | OUTPATIENT
Start: 2019-01-01 | End: 2019-01-01 | Stop reason: HOSPADM

## 2019-01-01 RX ORDER — MORPHINE SULFATE 4 MG/ML
4 INJECTION, SOLUTION INTRAMUSCULAR; INTRAVENOUS
Status: DISCONTINUED | OUTPATIENT
Start: 2019-01-01 | End: 2019-01-01 | Stop reason: HOSPADM

## 2019-01-01 RX ORDER — LIDOCAINE HYDROCHLORIDE 10 MG/ML
10 INJECTION, SOLUTION INFILTRATION; PERINEURAL ONCE
Status: COMPLETED | OUTPATIENT
Start: 2019-01-01 | End: 2019-01-01

## 2019-01-01 RX ORDER — ONDANSETRON 2 MG/ML
INJECTION INTRAMUSCULAR; INTRAVENOUS PRN
Status: DISCONTINUED | OUTPATIENT
Start: 2019-01-01 | End: 2019-01-01 | Stop reason: SDUPTHER

## 2019-01-01 RX ORDER — SODIUM CHLORIDE, SODIUM LACTATE, POTASSIUM CHLORIDE, AND CALCIUM CHLORIDE .6; .31; .03; .02 G/100ML; G/100ML; G/100ML; G/100ML
1000 INJECTION, SOLUTION INTRAVENOUS ONCE
Status: COMPLETED | OUTPATIENT
Start: 2019-01-01 | End: 2019-01-01

## 2019-01-01 RX ORDER — FENTANYL CITRATE 50 UG/ML
25 INJECTION, SOLUTION INTRAMUSCULAR; INTRAVENOUS EVERY 5 MIN PRN
Status: DISCONTINUED | OUTPATIENT
Start: 2019-01-01 | End: 2019-01-01 | Stop reason: HOSPADM

## 2019-01-01 RX ORDER — SUCRALFATE 1 G/1
1 TABLET ORAL EVERY 6 HOURS SCHEDULED
Status: DISCONTINUED | OUTPATIENT
Start: 2019-01-01 | End: 2019-01-01 | Stop reason: HOSPADM

## 2019-01-01 RX ORDER — METRONIDAZOLE 500 MG/1
500 TABLET ORAL 3 TIMES DAILY
Qty: 102 TABLET | Refills: 0 | Status: SHIPPED | OUTPATIENT
Start: 2019-01-01 | End: 2019-01-01

## 2019-01-01 RX ORDER — SOFT LENS RINSE,STORE SOLUTION
1 SOLUTION, NON-ORAL MISCELLANEOUS 2 TIMES DAILY
Qty: 1 BOTTLE | Refills: 2 | Status: SHIPPED | OUTPATIENT
Start: 2019-01-01

## 2019-01-01 RX ORDER — NICOTINE POLACRILEX 4 MG
15 LOZENGE BUCCAL PRN
Status: DISCONTINUED | OUTPATIENT
Start: 2019-01-01 | End: 2019-01-01 | Stop reason: HOSPADM

## 2019-01-01 RX ORDER — ONDANSETRON 2 MG/ML
4 INJECTION INTRAMUSCULAR; INTRAVENOUS EVERY 6 HOURS PRN
Status: DISCONTINUED | OUTPATIENT
Start: 2019-01-01 | End: 2019-01-01 | Stop reason: HOSPADM

## 2019-01-01 RX ORDER — GABAPENTIN 300 MG/1
300 CAPSULE ORAL 3 TIMES DAILY
Status: DISCONTINUED | OUTPATIENT
Start: 2019-01-01 | End: 2019-01-01

## 2019-01-01 RX ORDER — ACETAMINOPHEN 325 MG/1
650 TABLET ORAL EVERY 4 HOURS PRN
Status: DISCONTINUED | OUTPATIENT
Start: 2019-01-01 | End: 2019-01-01 | Stop reason: HOSPADM

## 2019-01-01 RX ORDER — ATORVASTATIN CALCIUM 40 MG/1
40 TABLET, FILM COATED ORAL DAILY
Status: DISCONTINUED | OUTPATIENT
Start: 2019-01-01 | End: 2019-01-01 | Stop reason: HOSPADM

## 2019-01-01 RX ORDER — VANCOMYCIN HYDROCHLORIDE 1 G/200ML
1000 INJECTION, SOLUTION INTRAVENOUS EVERY 12 HOURS
Status: DISCONTINUED | OUTPATIENT
Start: 2019-01-01 | End: 2019-01-01

## 2019-01-01 RX ORDER — MAGNESIUM HYDROXIDE 1200 MG/15ML
LIQUID ORAL CONTINUOUS PRN
Status: COMPLETED | OUTPATIENT
Start: 2019-01-01 | End: 2019-01-01

## 2019-01-01 RX ORDER — ROCURONIUM BROMIDE 10 MG/ML
INJECTION, SOLUTION INTRAVENOUS PRN
Status: DISCONTINUED | OUTPATIENT
Start: 2019-01-01 | End: 2019-01-01 | Stop reason: SDUPTHER

## 2019-01-01 RX ORDER — GABAPENTIN 300 MG/1
300 CAPSULE ORAL 3 TIMES DAILY
Qty: 90 CAPSULE | Refills: 5 | Status: SHIPPED | OUTPATIENT
Start: 2019-01-01 | End: 2019-09-23

## 2019-01-01 RX ORDER — MIDAZOLAM HYDROCHLORIDE 1 MG/ML
INJECTION INTRAMUSCULAR; INTRAVENOUS PRN
Status: DISCONTINUED | OUTPATIENT
Start: 2019-01-01 | End: 2019-01-01 | Stop reason: SDUPTHER

## 2019-01-01 RX ORDER — BLOOD PRESSURE TEST KIT
KIT MISCELLANEOUS
Qty: 30 EACH | Refills: 1 | Status: SHIPPED | OUTPATIENT
Start: 2019-01-01

## 2019-01-01 RX ORDER — INSULIN GLARGINE 100 [IU]/ML
35 INJECTION, SOLUTION SUBCUTANEOUS 2 TIMES DAILY
Status: DISCONTINUED | OUTPATIENT
Start: 2019-01-01 | End: 2019-01-01

## 2019-01-01 RX ORDER — LIDOCAINE HYDROCHLORIDE 10 MG/ML
INJECTION, SOLUTION EPIDURAL; INFILTRATION; INTRACAUDAL; PERINEURAL PRN
Status: DISCONTINUED | OUTPATIENT
Start: 2019-01-01 | End: 2019-01-01 | Stop reason: SDUPTHER

## 2019-01-01 RX ORDER — ONDANSETRON 2 MG/ML
4 INJECTION INTRAMUSCULAR; INTRAVENOUS
Status: DISCONTINUED | OUTPATIENT
Start: 2019-01-01 | End: 2019-01-01 | Stop reason: HOSPADM

## 2019-01-01 RX ADMIN — INSULIN GLARGINE 30 UNITS: 100 INJECTION, SOLUTION SUBCUTANEOUS at 16:09

## 2019-01-01 RX ADMIN — SUCRALFATE 1 G: 1 TABLET ORAL at 23:22

## 2019-01-01 RX ADMIN — Medication 10 ML: at 22:08

## 2019-01-01 RX ADMIN — Medication 10 ML: at 08:21

## 2019-01-01 RX ADMIN — Medication 10 ML: at 21:01

## 2019-01-01 RX ADMIN — INSULIN GLARGINE 35 UNITS: 100 INJECTION, SOLUTION SUBCUTANEOUS at 21:15

## 2019-01-01 RX ADMIN — PANTOPRAZOLE SODIUM 40 MG: 40 TABLET, DELAYED RELEASE ORAL at 05:20

## 2019-01-01 RX ADMIN — INSULIN LISPRO 4 UNITS: 100 INJECTION, SOLUTION INTRAVENOUS; SUBCUTANEOUS at 21:16

## 2019-01-01 RX ADMIN — Medication 10 ML: at 21:30

## 2019-01-01 RX ADMIN — FLUCONAZOLE 100 MG: 2 INJECTION, SOLUTION INTRAVENOUS at 17:28

## 2019-01-01 RX ADMIN — VANCOMYCIN HYDROCHLORIDE 1000 MG: 1 INJECTION, SOLUTION INTRAVENOUS at 21:48

## 2019-01-01 RX ADMIN — DESMOPRESSIN ACETATE 40 MG: 0.2 TABLET ORAL at 10:14

## 2019-01-01 RX ADMIN — GABAPENTIN 100 MG: 100 CAPSULE ORAL at 14:03

## 2019-01-01 RX ADMIN — FENTANYL CITRATE 25 MCG: 50 INJECTION INTRAMUSCULAR; INTRAVENOUS at 18:33

## 2019-01-01 RX ADMIN — SODIUM CHLORIDE 8 MG/HR: 9 INJECTION, SOLUTION INTRAVENOUS at 05:03

## 2019-01-01 RX ADMIN — SUCRALFATE 1 G: 1 TABLET ORAL at 05:40

## 2019-01-01 RX ADMIN — SUCRALFATE 1 G: 1 TABLET ORAL at 18:03

## 2019-01-01 RX ADMIN — PANTOPRAZOLE SODIUM 40 MG: 40 INJECTION, POWDER, FOR SOLUTION INTRAVENOUS at 11:21

## 2019-01-01 RX ADMIN — INSULIN GLARGINE 30 UNITS: 100 INJECTION, SOLUTION SUBCUTANEOUS at 08:33

## 2019-01-01 RX ADMIN — SUCRALFATE 1 G: 1 TABLET ORAL at 18:04

## 2019-01-01 RX ADMIN — SUCRALFATE 1 G: 1 TABLET ORAL at 14:32

## 2019-01-01 RX ADMIN — SENNOSIDES 8.6 MG: 8.6 TABLET, FILM COATED ORAL at 08:20

## 2019-01-01 RX ADMIN — INSULIN LISPRO 9 UNITS: 100 INJECTION, SOLUTION INTRAVENOUS; SUBCUTANEOUS at 18:00

## 2019-01-01 RX ADMIN — PIPERACILLIN AND TAZOBACTAM: 3; .375 INJECTION, POWDER, FOR SOLUTION INTRAVENOUS at 05:17

## 2019-01-01 RX ADMIN — HEPARIN SODIUM 5000 UNITS: 5000 INJECTION INTRAVENOUS; SUBCUTANEOUS at 05:40

## 2019-01-01 RX ADMIN — DESMOPRESSIN ACETATE 40 MG: 0.2 TABLET ORAL at 09:58

## 2019-01-01 RX ADMIN — PIPERACILLIN AND TAZOBACTAM: 3; .375 INJECTION, POWDER, FOR SOLUTION INTRAVENOUS at 21:15

## 2019-01-01 RX ADMIN — VANCOMYCIN HYDROCHLORIDE 1000 MG: 1 INJECTION, SOLUTION INTRAVENOUS at 14:23

## 2019-01-01 RX ADMIN — INSULIN GLARGINE 40 UNITS: 100 INJECTION, SOLUTION SUBCUTANEOUS at 10:26

## 2019-01-01 RX ADMIN — GABAPENTIN 100 MG: 100 CAPSULE ORAL at 21:15

## 2019-01-01 RX ADMIN — ONDANSETRON 4 MG: 2 INJECTION INTRAMUSCULAR; INTRAVENOUS at 20:49

## 2019-01-01 RX ADMIN — GABAPENTIN 100 MG: 100 CAPSULE ORAL at 20:33

## 2019-01-01 RX ADMIN — TAMSULOSIN HYDROCHLORIDE 0.4 MG: 0.4 CAPSULE ORAL at 10:14

## 2019-01-01 RX ADMIN — CALCIUM GLUCONATE 1 G: 98 INJECTION, SOLUTION INTRAVENOUS at 09:58

## 2019-01-01 RX ADMIN — FLUCONAZOLE 100 MG: 2 INJECTION, SOLUTION INTRAVENOUS at 18:02

## 2019-01-01 RX ADMIN — INSULIN LISPRO 9 UNITS: 100 INJECTION, SOLUTION INTRAVENOUS; SUBCUTANEOUS at 09:24

## 2019-01-01 RX ADMIN — GABAPENTIN 100 MG: 100 CAPSULE ORAL at 14:30

## 2019-01-01 RX ADMIN — POTASSIUM CHLORIDE: 149 INJECTION, SOLUTION, CONCENTRATE INTRAVENOUS at 09:44

## 2019-01-01 RX ADMIN — SENNOSIDES 8.6 MG: 8.6 TABLET, FILM COATED ORAL at 08:08

## 2019-01-01 RX ADMIN — Medication 10 ML: at 20:16

## 2019-01-01 RX ADMIN — PIPERACILLIN AND TAZOBACTAM 3.38 G: 3; .375 INJECTION, POWDER, FOR SOLUTION INTRAVENOUS at 23:03

## 2019-01-01 RX ADMIN — VANCOMYCIN HYDROCHLORIDE 1000 MG: 1 INJECTION, SOLUTION INTRAVENOUS at 22:06

## 2019-01-01 RX ADMIN — SODIUM CHLORIDE 1000 ML: 9 INJECTION, SOLUTION INTRAVENOUS at 23:05

## 2019-01-01 RX ADMIN — LIDOCAINE HYDROCHLORIDE 50 MG: 10 INJECTION, SOLUTION EPIDURAL; INFILTRATION; INTRACAUDAL; PERINEURAL at 14:46

## 2019-01-01 RX ADMIN — METOPROLOL TARTRATE 12.5 MG: 25 TABLET ORAL at 10:15

## 2019-01-01 RX ADMIN — CITALOPRAM 30 MG: 20 TABLET, FILM COATED ORAL at 08:08

## 2019-01-01 RX ADMIN — IRON SUCROSE 200 MG: 20 INJECTION, SOLUTION INTRAVENOUS at 06:05

## 2019-01-01 RX ADMIN — MIDAZOLAM HYDROCHLORIDE 1 MG: 1 INJECTION, SOLUTION INTRAMUSCULAR; INTRAVENOUS at 18:02

## 2019-01-01 RX ADMIN — Medication: at 11:10

## 2019-01-01 RX ADMIN — HEPARIN SODIUM 5000 UNITS: 5000 INJECTION INTRAVENOUS; SUBCUTANEOUS at 21:46

## 2019-01-01 RX ADMIN — PIPERACILLIN AND TAZOBACTAM: 3; .375 INJECTION, POWDER, FOR SOLUTION INTRAVENOUS at 14:30

## 2019-01-01 RX ADMIN — CITALOPRAM 30 MG: 20 TABLET, FILM COATED ORAL at 09:57

## 2019-01-01 RX ADMIN — PANTOPRAZOLE SODIUM 40 MG: 40 TABLET, DELAYED RELEASE ORAL at 06:05

## 2019-01-01 RX ADMIN — Medication 1250 MG: at 13:26

## 2019-01-01 RX ADMIN — METRONIDAZOLE 500 MG: 500 TABLET, FILM COATED ORAL at 14:14

## 2019-01-01 RX ADMIN — Medication 10 ML: at 20:40

## 2019-01-01 RX ADMIN — GABAPENTIN 100 MG: 100 CAPSULE ORAL at 20:43

## 2019-01-01 RX ADMIN — Medication 10 ML: at 21:15

## 2019-01-01 RX ADMIN — Medication 1250 MG: at 00:45

## 2019-01-01 RX ADMIN — PHENYLEPHRINE HYDROCHLORIDE 300 MCG: 10 INJECTION INTRAVENOUS at 14:54

## 2019-01-01 RX ADMIN — SODIUM CHLORIDE 1000 ML: 9 INJECTION, SOLUTION INTRAVENOUS at 01:00

## 2019-01-01 RX ADMIN — PIPERACILLIN AND TAZOBACTAM: 3; .375 INJECTION, POWDER, FOR SOLUTION INTRAVENOUS at 05:36

## 2019-01-01 RX ADMIN — INSULIN LISPRO 3 UNITS: 100 INJECTION, SOLUTION INTRAVENOUS; SUBCUTANEOUS at 21:46

## 2019-01-01 RX ADMIN — INSULIN GLARGINE 30 UNITS: 100 INJECTION, SOLUTION SUBCUTANEOUS at 22:04

## 2019-01-01 RX ADMIN — SUCRALFATE 1 G: 1 TABLET ORAL at 12:18

## 2019-01-01 RX ADMIN — GABAPENTIN 100 MG: 100 CAPSULE ORAL at 10:23

## 2019-01-01 RX ADMIN — Medication: at 07:09

## 2019-01-01 RX ADMIN — INSULIN GLARGINE 40 UNITS: 100 INJECTION, SOLUTION SUBCUTANEOUS at 10:24

## 2019-01-01 RX ADMIN — SODIUM CHLORIDE 0.07 UNITS/KG/HR: 9 INJECTION, SOLUTION INTRAVENOUS at 10:08

## 2019-01-01 RX ADMIN — SUCRALFATE 1 G: 1 TABLET ORAL at 00:44

## 2019-01-01 RX ADMIN — METOPROLOL TARTRATE 12.5 MG: 25 TABLET ORAL at 21:43

## 2019-01-01 RX ADMIN — QUETIAPINE FUMARATE 300 MG: 300 TABLET ORAL at 21:30

## 2019-01-01 RX ADMIN — INSULIN LISPRO 3 UNITS: 100 INJECTION, SOLUTION INTRAVENOUS; SUBCUTANEOUS at 12:34

## 2019-01-01 RX ADMIN — GADOTERIDOL 20 ML: 279.3 INJECTION, SOLUTION INTRAVENOUS at 11:58

## 2019-01-01 RX ADMIN — VANCOMYCIN HYDROCHLORIDE 1000 MG: 1 INJECTION, SOLUTION INTRAVENOUS at 21:58

## 2019-01-01 RX ADMIN — SUCRALFATE 1 G: 1 TABLET ORAL at 06:03

## 2019-01-01 RX ADMIN — INSULIN GLARGINE 30 UNITS: 100 INJECTION, SOLUTION SUBCUTANEOUS at 10:06

## 2019-01-01 RX ADMIN — Medication 10 ML: at 08:11

## 2019-01-01 RX ADMIN — SODIUM CHLORIDE, POTASSIUM CHLORIDE, SODIUM LACTATE AND CALCIUM CHLORIDE 1000 ML: 600; 310; 30; 20 INJECTION, SOLUTION INTRAVENOUS at 02:01

## 2019-01-01 RX ADMIN — VANCOMYCIN HYDROCHLORIDE 1000 MG: 1 INJECTION, SOLUTION INTRAVENOUS at 01:53

## 2019-01-01 RX ADMIN — DESMOPRESSIN ACETATE 40 MG: 0.2 TABLET ORAL at 08:48

## 2019-01-01 RX ADMIN — PIPERACILLIN AND TAZOBACTAM: 3; .375 INJECTION, POWDER, FOR SOLUTION INTRAVENOUS at 21:20

## 2019-01-01 RX ADMIN — INSULIN LISPRO 4 UNITS: 100 INJECTION, SOLUTION INTRAVENOUS; SUBCUTANEOUS at 09:27

## 2019-01-01 RX ADMIN — CITALOPRAM 30 MG: 20 TABLET, FILM COATED ORAL at 09:25

## 2019-01-01 RX ADMIN — PIPERACILLIN AND TAZOBACTAM: 3; .375 INJECTION, POWDER, FOR SOLUTION INTRAVENOUS at 21:29

## 2019-01-01 RX ADMIN — HEPARIN SODIUM 5000 UNITS: 5000 INJECTION INTRAVENOUS; SUBCUTANEOUS at 21:10

## 2019-01-01 RX ADMIN — CITALOPRAM 30 MG: 20 TABLET, FILM COATED ORAL at 08:48

## 2019-01-01 RX ADMIN — MEROPENEM 1 G: 1 INJECTION, POWDER, FOR SOLUTION INTRAVENOUS at 00:25

## 2019-01-01 RX ADMIN — PANTOPRAZOLE SODIUM 40 MG: 40 TABLET, DELAYED RELEASE ORAL at 16:41

## 2019-01-01 RX ADMIN — LIDOCAINE HYDROCHLORIDE 10 ML: 10 INJECTION, SOLUTION INFILTRATION; PERINEURAL at 10:20

## 2019-01-01 RX ADMIN — Medication 10 ML: at 10:09

## 2019-01-01 RX ADMIN — GABAPENTIN 100 MG: 100 CAPSULE ORAL at 14:29

## 2019-01-01 RX ADMIN — GABAPENTIN 100 MG: 100 CAPSULE ORAL at 14:14

## 2019-01-01 RX ADMIN — Medication 0.1 MG: at 18:04

## 2019-01-01 RX ADMIN — CHOLECALCIFEROL TAB 10 MCG (400 UNIT) 800 UNITS: 10 TAB at 10:23

## 2019-01-01 RX ADMIN — INSULIN LISPRO 10 UNITS: 100 INJECTION, SOLUTION INTRAVENOUS; SUBCUTANEOUS at 09:02

## 2019-01-01 RX ADMIN — POTASSIUM CHLORIDE: 149 INJECTION, SOLUTION, CONCENTRATE INTRAVENOUS at 12:24

## 2019-01-01 RX ADMIN — SUCRALFATE 1 G: 1 TABLET ORAL at 22:07

## 2019-01-01 RX ADMIN — ROCURONIUM BROMIDE 40 MG: 10 INJECTION INTRAVENOUS at 14:46

## 2019-01-01 RX ADMIN — DESMOPRESSIN ACETATE 40 MG: 0.2 TABLET ORAL at 08:08

## 2019-01-01 RX ADMIN — CITALOPRAM 30 MG: 20 TABLET, FILM COATED ORAL at 08:19

## 2019-01-01 RX ADMIN — TAMSULOSIN HYDROCHLORIDE 0.4 MG: 0.4 CAPSULE ORAL at 08:10

## 2019-01-01 RX ADMIN — INSULIN GLARGINE 35 UNITS: 100 INJECTION, SOLUTION SUBCUTANEOUS at 09:31

## 2019-01-01 RX ADMIN — SODIUM CHLORIDE 8 MG/HR: 9 INJECTION, SOLUTION INTRAVENOUS at 07:10

## 2019-01-01 RX ADMIN — CITALOPRAM 30 MG: 20 TABLET, FILM COATED ORAL at 10:22

## 2019-01-01 RX ADMIN — INSULIN LISPRO 2 UNITS: 100 INJECTION, SOLUTION INTRAVENOUS; SUBCUTANEOUS at 08:42

## 2019-01-01 RX ADMIN — SUCRALFATE 1 G: 1 TABLET ORAL at 07:00

## 2019-01-01 RX ADMIN — SUCRALFATE 1 G: 1 TABLET ORAL at 10:16

## 2019-01-01 RX ADMIN — PIPERACILLIN AND TAZOBACTAM: 3; .375 INJECTION, POWDER, FOR SOLUTION INTRAVENOUS at 16:45

## 2019-01-01 RX ADMIN — TAMSULOSIN HYDROCHLORIDE 0.4 MG: 0.4 CAPSULE ORAL at 10:23

## 2019-01-01 RX ADMIN — CHOLECALCIFEROL TAB 10 MCG (400 UNIT) 800 UNITS: 10 TAB at 10:16

## 2019-01-01 RX ADMIN — INSULIN LISPRO 5 UNITS: 100 INJECTION, SOLUTION INTRAVENOUS; SUBCUTANEOUS at 21:11

## 2019-01-01 RX ADMIN — INSULIN LISPRO 2 UNITS: 100 INJECTION, SOLUTION INTRAVENOUS; SUBCUTANEOUS at 10:05

## 2019-01-01 RX ADMIN — GABAPENTIN 100 MG: 100 CAPSULE ORAL at 09:26

## 2019-01-01 RX ADMIN — SODIUM CHLORIDE 0.05 UNITS/KG/HR: 9 INJECTION, SOLUTION INTRAVENOUS at 03:30

## 2019-01-01 RX ADMIN — PANTOPRAZOLE SODIUM 40 MG: 40 INJECTION, POWDER, FOR SOLUTION INTRAVENOUS at 08:19

## 2019-01-01 RX ADMIN — MORPHINE SULFATE 4 MG: 4 INJECTION, SOLUTION INTRAMUSCULAR; INTRAVENOUS at 12:42

## 2019-01-01 RX ADMIN — PIPERACILLIN AND TAZOBACTAM: 3; .375 INJECTION, POWDER, FOR SOLUTION INTRAVENOUS at 06:05

## 2019-01-01 RX ADMIN — SODIUM BICARBONATE: 84 INJECTION, SOLUTION INTRAVENOUS at 01:54

## 2019-01-01 RX ADMIN — SODIUM PHOSPHATE, MONOBASIC, MONOHYDRATE 15 MMOL: 276; 142 INJECTION, SOLUTION INTRAVENOUS at 08:15

## 2019-01-01 RX ADMIN — SUCRALFATE 1 G: 1 TABLET ORAL at 10:24

## 2019-01-01 RX ADMIN — SENNOSIDES 8.6 MG: 8.6 TABLET, FILM COATED ORAL at 10:15

## 2019-01-01 RX ADMIN — SUCRALFATE 1 G: 1 TABLET ORAL at 05:05

## 2019-01-01 RX ADMIN — MAGNESIUM SULFATE HEPTAHYDRATE 1 G: 1 INJECTION, SOLUTION INTRAVENOUS at 12:08

## 2019-01-01 RX ADMIN — METOPROLOL TARTRATE 12.5 MG: 25 TABLET ORAL at 22:55

## 2019-01-01 RX ADMIN — METOPROLOL TARTRATE 12.5 MG: 25 TABLET ORAL at 20:33

## 2019-01-01 RX ADMIN — MAGNESIUM SULFATE HEPTAHYDRATE 1 G: 1 INJECTION, SOLUTION INTRAVENOUS at 09:31

## 2019-01-01 RX ADMIN — GABAPENTIN 100 MG: 100 CAPSULE ORAL at 22:08

## 2019-01-01 RX ADMIN — PANTOPRAZOLE SODIUM 40 MG: 40 TABLET, DELAYED RELEASE ORAL at 18:00

## 2019-01-01 RX ADMIN — OXYCODONE HYDROCHLORIDE 5 MG: 5 TABLET ORAL at 22:00

## 2019-01-01 RX ADMIN — Medication 10 ML: at 09:56

## 2019-01-01 RX ADMIN — SUCRALFATE 1 G: 1 TABLET ORAL at 23:16

## 2019-01-01 RX ADMIN — CIPROFLOXACIN 500 MG: 500 TABLET ORAL at 20:33

## 2019-01-01 RX ADMIN — SODIUM CHLORIDE, POTASSIUM CHLORIDE, SODIUM LACTATE AND CALCIUM CHLORIDE: 600; 310; 30; 20 INJECTION, SOLUTION INTRAVENOUS at 14:36

## 2019-01-01 RX ADMIN — ONDANSETRON 4 MG: 2 INJECTION, SOLUTION INTRAMUSCULAR; INTRAVENOUS at 18:01

## 2019-01-01 RX ADMIN — GABAPENTIN 100 MG: 100 CAPSULE ORAL at 09:56

## 2019-01-01 RX ADMIN — DESMOPRESSIN ACETATE 40 MG: 0.2 TABLET ORAL at 09:25

## 2019-01-01 RX ADMIN — GABAPENTIN 100 MG: 100 CAPSULE ORAL at 20:15

## 2019-01-01 RX ADMIN — SUCRALFATE 1 G: 1 TABLET ORAL at 06:26

## 2019-01-01 RX ADMIN — ONDANSETRON 4 MG: 2 INJECTION, SOLUTION INTRAMUSCULAR; INTRAVENOUS at 14:53

## 2019-01-01 RX ADMIN — CALCIUM GLUCONATE 2 G: 98 INJECTION, SOLUTION INTRAVENOUS at 09:31

## 2019-01-01 RX ADMIN — PIPERACILLIN AND TAZOBACTAM 3.38 G: 3; .375 INJECTION, POWDER, FOR SOLUTION INTRAVENOUS at 06:23

## 2019-01-01 RX ADMIN — GABAPENTIN 100 MG: 100 CAPSULE ORAL at 08:37

## 2019-01-01 RX ADMIN — PROPOFOL 200 MG: 10 INJECTION, EMULSION INTRAVENOUS at 14:46

## 2019-01-01 RX ADMIN — METOPROLOL TARTRATE 12.5 MG: 25 TABLET ORAL at 08:20

## 2019-01-01 RX ADMIN — VANCOMYCIN HYDROCHLORIDE 1250 MG: 10 INJECTION, POWDER, LYOPHILIZED, FOR SOLUTION INTRAVENOUS at 09:33

## 2019-01-01 RX ADMIN — INSULIN LISPRO 10 UNITS: 100 INJECTION, SOLUTION INTRAVENOUS; SUBCUTANEOUS at 14:21

## 2019-01-01 RX ADMIN — HEPARIN SODIUM 5000 UNITS: 5000 INJECTION INTRAVENOUS; SUBCUTANEOUS at 08:00

## 2019-01-01 RX ADMIN — HEPARIN SODIUM 5000 UNITS: 5000 INJECTION INTRAVENOUS; SUBCUTANEOUS at 13:30

## 2019-01-01 RX ADMIN — INSULIN GLARGINE 40 UNITS: 100 INJECTION, SOLUTION SUBCUTANEOUS at 09:22

## 2019-01-01 RX ADMIN — Medication 10 ML: at 21:53

## 2019-01-01 RX ADMIN — PHENYLEPHRINE HYDROCHLORIDE 300 MCG: 10 INJECTION INTRAVENOUS at 14:51

## 2019-01-01 RX ADMIN — GABAPENTIN 100 MG: 100 CAPSULE ORAL at 21:47

## 2019-01-01 RX ADMIN — QUETIAPINE FUMARATE 300 MG: 300 TABLET ORAL at 21:47

## 2019-01-01 RX ADMIN — VANCOMYCIN HYDROCHLORIDE 1000 MG: 1 INJECTION, SOLUTION INTRAVENOUS at 10:22

## 2019-01-01 RX ADMIN — PIPERACILLIN AND TAZOBACTAM: 3; .375 INJECTION, POWDER, FOR SOLUTION INTRAVENOUS at 15:19

## 2019-01-01 RX ADMIN — SUGAMMADEX 360 MG: 100 INJECTION, SOLUTION INTRAVENOUS at 14:58

## 2019-01-01 RX ADMIN — IRON SUCROSE 200 MG: 20 INJECTION, SOLUTION INTRAVENOUS at 05:17

## 2019-01-01 RX ADMIN — HEPARIN SODIUM 5000 UNITS: 5000 INJECTION INTRAVENOUS; SUBCUTANEOUS at 22:04

## 2019-01-01 RX ADMIN — QUETIAPINE FUMARATE 300 MG: 300 TABLET ORAL at 20:33

## 2019-01-01 RX ADMIN — PANTOPRAZOLE SODIUM 40 MG: 40 TABLET, DELAYED RELEASE ORAL at 06:26

## 2019-01-01 RX ADMIN — SUCRALFATE 1 G: 1 TABLET ORAL at 11:47

## 2019-01-01 RX ADMIN — INSULIN LISPRO 2 UNITS: 100 INJECTION, SOLUTION INTRAVENOUS; SUBCUTANEOUS at 20:37

## 2019-01-01 RX ADMIN — Medication 10 ML: at 08:22

## 2019-01-01 RX ADMIN — PIPERACILLIN AND TAZOBACTAM: 3; .375 INJECTION, POWDER, FOR SOLUTION INTRAVENOUS at 14:03

## 2019-01-01 RX ADMIN — PIPERACILLIN AND TAZOBACTAM: 3; .375 INJECTION, POWDER, FOR SOLUTION INTRAVENOUS at 05:05

## 2019-01-01 RX ADMIN — Medication 10 ML: at 11:19

## 2019-01-01 RX ADMIN — CHOLECALCIFEROL TAB 10 MCG (400 UNIT) 800 UNITS: 10 TAB at 16:48

## 2019-01-01 RX ADMIN — SUCRALFATE 1 G: 1 TABLET ORAL at 12:23

## 2019-01-01 RX ADMIN — SODIUM CHLORIDE 6.8 UNITS/HR: 9 INJECTION, SOLUTION INTRAVENOUS at 10:21

## 2019-01-01 RX ADMIN — PIPERACILLIN AND TAZOBACTAM 3.38 G: 3; .375 INJECTION, POWDER, LYOPHILIZED, FOR SOLUTION INTRAVENOUS; PARENTERAL at 08:24

## 2019-01-01 RX ADMIN — PIPERACILLIN AND TAZOBACTAM 3.38 G: 3; .375 INJECTION, POWDER, FOR SOLUTION INTRAVENOUS at 06:36

## 2019-01-01 RX ADMIN — HEPARIN SODIUM 5000 UNITS: 5000 INJECTION INTRAVENOUS; SUBCUTANEOUS at 06:15

## 2019-01-01 RX ADMIN — SUCRALFATE 1 G: 1 TABLET ORAL at 00:52

## 2019-01-01 RX ADMIN — SUCRALFATE 1 G: 1 TABLET ORAL at 14:34

## 2019-01-01 RX ADMIN — INSULIN LISPRO 9 UNITS: 100 INJECTION, SOLUTION INTRAVENOUS; SUBCUTANEOUS at 16:41

## 2019-01-01 RX ADMIN — SODIUM PHOSPHATE, MONOBASIC, MONOHYDRATE 10 MMOL: 276; 142 INJECTION, SOLUTION INTRAVENOUS at 16:47

## 2019-01-01 RX ADMIN — HEPARIN SODIUM 5000 UNITS: 5000 INJECTION INTRAVENOUS; SUBCUTANEOUS at 14:30

## 2019-01-01 RX ADMIN — Medication 10 ML: at 20:45

## 2019-01-01 RX ADMIN — INSULIN GLARGINE 40 UNITS: 100 INJECTION, SOLUTION SUBCUTANEOUS at 21:45

## 2019-01-01 RX ADMIN — SUCRALFATE 1 G: 1 TABLET ORAL at 17:28

## 2019-01-01 RX ADMIN — FLUCONAZOLE 100 MG: 2 INJECTION, SOLUTION INTRAVENOUS at 18:13

## 2019-01-01 RX ADMIN — FLUCONAZOLE 100 MG: 2 INJECTION, SOLUTION INTRAVENOUS at 17:18

## 2019-01-01 RX ADMIN — SUCRALFATE 1 G: 1 TABLET ORAL at 05:20

## 2019-01-01 RX ADMIN — CITALOPRAM 30 MG: 20 TABLET, FILM COATED ORAL at 10:17

## 2019-01-01 RX ADMIN — Medication 10 UNITS: at 00:50

## 2019-01-01 RX ADMIN — GABAPENTIN 100 MG: 100 CAPSULE ORAL at 08:08

## 2019-01-01 RX ADMIN — METOPROLOL TARTRATE 12.5 MG: 25 TABLET ORAL at 21:49

## 2019-01-01 RX ADMIN — INSULIN LISPRO 4 UNITS: 100 INJECTION, SOLUTION INTRAVENOUS; SUBCUTANEOUS at 22:04

## 2019-01-01 RX ADMIN — ONDANSETRON 4 MG: 2 INJECTION INTRAMUSCULAR; INTRAVENOUS at 16:42

## 2019-01-01 RX ADMIN — PIPERACILLIN AND TAZOBACTAM: 3; .375 INJECTION, POWDER, FOR SOLUTION INTRAVENOUS at 07:20

## 2019-01-01 RX ADMIN — INSULIN GLARGINE 40 UNITS: 100 INJECTION, SOLUTION SUBCUTANEOUS at 20:33

## 2019-01-01 RX ADMIN — CHOLECALCIFEROL TAB 10 MCG (400 UNIT) 800 UNITS: 10 TAB at 08:10

## 2019-01-01 RX ADMIN — GABAPENTIN 100 MG: 100 CAPSULE ORAL at 14:31

## 2019-01-01 RX ADMIN — OXYCODONE HYDROCHLORIDE 5 MG: 5 TABLET ORAL at 18:18

## 2019-01-01 RX ADMIN — INSULIN LISPRO 6 UNITS: 100 INJECTION, SOLUTION INTRAVENOUS; SUBCUTANEOUS at 17:40

## 2019-01-01 RX ADMIN — INSULIN LISPRO 6 UNITS: 100 INJECTION, SOLUTION INTRAVENOUS; SUBCUTANEOUS at 17:20

## 2019-01-01 RX ADMIN — FLUCONAZOLE 100 MG: 2 INJECTION, SOLUTION INTRAVENOUS at 20:59

## 2019-01-01 RX ADMIN — GABAPENTIN 100 MG: 100 CAPSULE ORAL at 08:19

## 2019-01-01 RX ADMIN — SODIUM CHLORIDE 250 ML: 9 INJECTION, SOLUTION INTRAVENOUS at 10:13

## 2019-01-01 RX ADMIN — POTASSIUM CHLORIDE: 149 INJECTION, SOLUTION, CONCENTRATE INTRAVENOUS at 09:55

## 2019-01-01 RX ADMIN — QUETIAPINE FUMARATE 300 MG: 300 TABLET ORAL at 22:00

## 2019-01-01 RX ADMIN — PANTOPRAZOLE SODIUM 40 MG: 40 INJECTION, POWDER, FOR SOLUTION INTRAVENOUS at 22:41

## 2019-01-01 RX ADMIN — INSULIN LISPRO 3 UNITS: 100 INJECTION, SOLUTION INTRAVENOUS; SUBCUTANEOUS at 08:21

## 2019-01-01 RX ADMIN — METOPROLOL TARTRATE 12.5 MG: 25 TABLET ORAL at 10:23

## 2019-01-01 RX ADMIN — Medication 10 ML: at 10:25

## 2019-01-01 RX ADMIN — INSULIN LISPRO 5 UNITS: 100 INJECTION, SOLUTION INTRAVENOUS; SUBCUTANEOUS at 20:35

## 2019-01-01 RX ADMIN — SODIUM CHLORIDE 8 MG/HR: 9 INJECTION, SOLUTION INTRAVENOUS at 21:54

## 2019-01-01 RX ADMIN — POTASSIUM CHLORIDE: 149 INJECTION, SOLUTION, CONCENTRATE INTRAVENOUS at 23:15

## 2019-01-01 RX ADMIN — DESMOPRESSIN ACETATE 40 MG: 0.2 TABLET ORAL at 08:20

## 2019-01-01 RX ADMIN — CALCIUM GLUCONATE 3 G: 98 INJECTION, SOLUTION INTRAVENOUS at 00:51

## 2019-01-01 RX ADMIN — SODIUM CHLORIDE 1000 ML: 9 INJECTION, SOLUTION INTRAVENOUS at 10:07

## 2019-01-01 RX ADMIN — PIPERACILLIN AND TAZOBACTAM: 3; .375 INJECTION, POWDER, FOR SOLUTION INTRAVENOUS at 21:12

## 2019-01-01 RX ADMIN — Medication 10 MG: at 19:13

## 2019-01-01 RX ADMIN — SUCRALFATE 1 G: 1 TABLET ORAL at 23:59

## 2019-01-01 RX ADMIN — GABAPENTIN 100 MG: 100 CAPSULE ORAL at 21:30

## 2019-01-01 RX ADMIN — INSULIN GLARGINE 40 UNITS: 100 INJECTION, SOLUTION SUBCUTANEOUS at 08:15

## 2019-01-01 RX ADMIN — HEPARIN SODIUM 5000 UNITS: 5000 INJECTION INTRAVENOUS; SUBCUTANEOUS at 05:22

## 2019-01-01 RX ADMIN — PIPERACILLIN AND TAZOBACTAM: 3; .375 INJECTION, POWDER, FOR SOLUTION INTRAVENOUS at 06:03

## 2019-01-01 RX ADMIN — CITALOPRAM 30 MG: 20 TABLET, FILM COATED ORAL at 08:30

## 2019-01-01 RX ADMIN — SUCRALFATE 1 G: 1 TABLET ORAL at 12:05

## 2019-01-01 RX ADMIN — SODIUM CHLORIDE 80 MG: 9 INJECTION, SOLUTION INTRAVENOUS at 11:52

## 2019-01-01 RX ADMIN — DESMOPRESSIN ACETATE 40 MG: 0.2 TABLET ORAL at 08:37

## 2019-01-01 RX ADMIN — FLUCONAZOLE 100 MG: 2 INJECTION, SOLUTION INTRAVENOUS at 18:15

## 2019-01-01 RX ADMIN — HEPARIN SODIUM 5000 UNITS: 5000 INJECTION INTRAVENOUS; SUBCUTANEOUS at 22:00

## 2019-01-01 RX ADMIN — VANCOMYCIN HYDROCHLORIDE 1000 MG: 1 INJECTION, SOLUTION INTRAVENOUS at 12:08

## 2019-01-01 RX ADMIN — SODIUM CHLORIDE, POTASSIUM CHLORIDE, SODIUM LACTATE AND CALCIUM CHLORIDE: 600; 310; 30; 20 INJECTION, SOLUTION INTRAVENOUS at 17:55

## 2019-01-01 RX ADMIN — INSULIN GLARGINE 40 UNITS: 100 INJECTION, SOLUTION SUBCUTANEOUS at 21:46

## 2019-01-01 RX ADMIN — PIPERACILLIN AND TAZOBACTAM: 3; .375 INJECTION, POWDER, FOR SOLUTION INTRAVENOUS at 21:53

## 2019-01-01 RX ADMIN — INSULIN LISPRO 2 UNITS: 100 INJECTION, SOLUTION INTRAVENOUS; SUBCUTANEOUS at 17:25

## 2019-01-01 RX ADMIN — DESMOPRESSIN ACETATE 40 MG: 0.2 TABLET ORAL at 10:23

## 2019-01-01 RX ADMIN — PIPERACILLIN AND TAZOBACTAM: 3; .375 INJECTION, POWDER, FOR SOLUTION INTRAVENOUS at 17:36

## 2019-01-01 RX ADMIN — SUCRALFATE 1 G: 1 TABLET ORAL at 16:41

## 2019-01-01 RX ADMIN — SODIUM CHLORIDE: 4.5 INJECTION, SOLUTION INTRAVENOUS at 08:46

## 2019-01-01 RX ADMIN — SODIUM CHLORIDE 8 MG/HR: 9 INJECTION, SOLUTION INTRAVENOUS at 18:30

## 2019-01-01 RX ADMIN — QUETIAPINE FUMARATE 300 MG: 300 TABLET ORAL at 22:08

## 2019-01-01 RX ADMIN — SODIUM CHLORIDE 3.6 UNITS/HR: 9 INJECTION, SOLUTION INTRAVENOUS at 03:22

## 2019-01-01 RX ADMIN — PIPERACILLIN AND TAZOBACTAM: 3; .375 INJECTION, POWDER, FOR SOLUTION INTRAVENOUS at 21:48

## 2019-01-01 RX ADMIN — GABAPENTIN 100 MG: 100 CAPSULE ORAL at 10:15

## 2019-01-01 RX ADMIN — ONDANSETRON 4 MG: 2 INJECTION INTRAMUSCULAR; INTRAVENOUS at 00:52

## 2019-01-01 RX ADMIN — INSULIN LISPRO 6 UNITS: 100 INJECTION, SOLUTION INTRAVENOUS; SUBCUTANEOUS at 12:27

## 2019-01-01 RX ADMIN — GABAPENTIN 100 MG: 100 CAPSULE ORAL at 21:18

## 2019-01-01 RX ADMIN — SUCRALFATE 1 G: 1 TABLET ORAL at 18:15

## 2019-01-01 RX ADMIN — SUCRALFATE 1 G: 1 TABLET ORAL at 06:05

## 2019-01-01 RX ADMIN — QUETIAPINE FUMARATE 300 MG: 300 TABLET ORAL at 21:13

## 2019-01-01 RX ADMIN — MAGNESIUM SULFATE HEPTAHYDRATE 2 G: 1 INJECTION, SOLUTION INTRAVENOUS at 09:58

## 2019-01-01 RX ADMIN — METOPROLOL TARTRATE 12.5 MG: 25 TABLET ORAL at 08:10

## 2019-01-01 RX ADMIN — CHOLECALCIFEROL TAB 10 MCG (400 UNIT) 800 UNITS: 10 TAB at 08:20

## 2019-01-01 RX ADMIN — METOCLOPRAMIDE 10 MG: 5 INJECTION, SOLUTION INTRAMUSCULAR; INTRAVENOUS at 11:49

## 2019-01-01 RX ADMIN — PANTOPRAZOLE SODIUM 40 MG: 40 INJECTION, POWDER, FOR SOLUTION INTRAVENOUS at 09:31

## 2019-01-01 RX ADMIN — GABAPENTIN 100 MG: 100 CAPSULE ORAL at 16:10

## 2019-01-01 RX ADMIN — FLUCONAZOLE 100 MG: 2 INJECTION, SOLUTION INTRAVENOUS at 18:03

## 2019-01-01 RX ADMIN — IRON SUCROSE 200 MG: 20 INJECTION, SOLUTION INTRAVENOUS at 06:34

## 2019-01-01 RX ADMIN — METOPROLOL TARTRATE 12.5 MG: 25 TABLET ORAL at 21:13

## 2019-01-01 RX ADMIN — SUCRALFATE 1 G: 1 TABLET ORAL at 00:35

## 2019-01-01 RX ADMIN — Medication 10 ML: at 11:11

## 2019-01-01 RX ADMIN — SUCRALFATE 1 G: 1 TABLET ORAL at 06:23

## 2019-01-01 RX ADMIN — POTASSIUM PHOSPHATE, MONOBASIC AND POTASSIUM PHOSPHATE, DIBASIC 20 MMOL: 224; 236 INJECTION, SOLUTION, CONCENTRATE INTRAVENOUS at 06:38

## 2019-01-01 RX ADMIN — PIPERACILLIN AND TAZOBACTAM: 3; .375 INJECTION, POWDER, FOR SOLUTION INTRAVENOUS at 14:20

## 2019-01-01 RX ADMIN — INSULIN GLARGINE 30 UNITS: 100 INJECTION, SOLUTION SUBCUTANEOUS at 23:18

## 2019-01-01 RX ADMIN — MIDAZOLAM HYDROCHLORIDE 1 MG: 1 INJECTION, SOLUTION INTRAMUSCULAR; INTRAVENOUS at 18:23

## 2019-01-01 RX ADMIN — ONDANSETRON 4 MG: 2 INJECTION INTRAMUSCULAR; INTRAVENOUS at 22:25

## 2019-01-01 RX ADMIN — MORPHINE SULFATE 4 MG: 4 INJECTION INTRAVENOUS at 12:42

## 2019-01-01 RX ADMIN — SUCRALFATE 1 G: 1 TABLET ORAL at 18:17

## 2019-01-01 RX ADMIN — Medication 10 ML: at 21:16

## 2019-01-01 RX ADMIN — Medication 1250 MG: at 02:09

## 2019-01-01 RX ADMIN — Medication: at 21:53

## 2019-01-01 RX ADMIN — SODIUM CHLORIDE 8 MG/HR: 9 INJECTION, SOLUTION INTRAVENOUS at 12:00

## 2019-01-01 RX ADMIN — Medication 10 ML: at 22:41

## 2019-01-01 RX ADMIN — INSULIN LISPRO 6 UNITS: 100 INJECTION, SOLUTION INTRAVENOUS; SUBCUTANEOUS at 11:46

## 2019-01-01 RX ADMIN — PIPERACILLIN AND TAZOBACTAM: 3; .375 INJECTION, POWDER, FOR SOLUTION INTRAVENOUS at 22:41

## 2019-01-01 RX ADMIN — VANCOMYCIN HYDROCHLORIDE 1000 MG: 1 INJECTION, SOLUTION INTRAVENOUS at 00:44

## 2019-01-01 RX ADMIN — Medication 10 ML: at 09:00

## 2019-01-01 RX ADMIN — PIPERACILLIN AND TAZOBACTAM 3.38 G: 3; .375 INJECTION, POWDER, FOR SOLUTION INTRAVENOUS at 16:29

## 2019-01-01 RX ADMIN — GABAPENTIN 100 MG: 100 CAPSULE ORAL at 08:47

## 2019-01-01 RX ADMIN — PANTOPRAZOLE SODIUM 40 MG: 40 INJECTION, POWDER, FOR SOLUTION INTRAVENOUS at 21:14

## 2019-01-01 RX ADMIN — QUETIAPINE FUMARATE 300 MG: 300 TABLET ORAL at 20:44

## 2019-01-01 RX ADMIN — GABAPENTIN 100 MG: 100 CAPSULE ORAL at 13:30

## 2019-01-01 RX ADMIN — GABAPENTIN 100 MG: 100 CAPSULE ORAL at 21:13

## 2019-01-01 RX ADMIN — HEPARIN SODIUM 5000 UNITS: 5000 INJECTION INTRAVENOUS; SUBCUTANEOUS at 14:14

## 2019-01-01 RX ADMIN — METRONIDAZOLE 500 MG: 500 TABLET, FILM COATED ORAL at 22:00

## 2019-01-01 RX ADMIN — PHENYLEPHRINE HYDROCHLORIDE 200 MCG: 10 INJECTION INTRAVENOUS at 14:58

## 2019-01-01 RX ADMIN — Medication: at 12:22

## 2019-01-01 ASSESSMENT — ENCOUNTER SYMPTOMS
NAUSEA: 0
COLOR CHANGE: 1
VOMITING: 1
COLOR CHANGE: 1
NAUSEA: 0
CHOKING: 0
STRIDOR: 0
COUGH: 0
COLOR CHANGE: 1
SHORTNESS OF BREATH: 0
COUGH: 0
ABDOMINAL PAIN: 0
APNEA: 0
SHORTNESS OF BREATH: 0
EYE ITCHING: 0
COLOR CHANGE: 0
STRIDOR: 0
SHORTNESS OF BREATH: 0
SORE THROAT: 0
SHORTNESS OF BREATH: 0
COUGH: 0
ABDOMINAL PAIN: 0
CHEST TIGHTNESS: 0
BACK PAIN: 0
EYE ITCHING: 0
EYE ITCHING: 0
SHORTNESS OF BREATH: 0
EYE ITCHING: 0
DIARRHEA: 0
ABDOMINAL PAIN: 0
COLOR CHANGE: 1
NAUSEA: 0
ABDOMINAL PAIN: 0
EYE ITCHING: 0
NAUSEA: 0
SHORTNESS OF BREATH: 0
CHOKING: 0
CHOKING: 0
STRIDOR: 0
NAUSEA: 0
COLOR CHANGE: 1
COLOR CHANGE: 0
CHOKING: 0
COLOR CHANGE: 0
DIARRHEA: 0
DIARRHEA: 0
SHORTNESS OF BREATH: 0
ABDOMINAL PAIN: 0
PHOTOPHOBIA: 0
SHORTNESS OF BREATH: 0
BACK PAIN: 0
COUGH: 0
STRIDOR: 0
DIARRHEA: 0
COLOR CHANGE: 0
STRIDOR: 0
CHOKING: 0
EYE PAIN: 0
COLOR CHANGE: 1
VOMITING: 0
RHINORRHEA: 0
SHORTNESS OF BREATH: 0
WHEEZING: 0
ABDOMINAL PAIN: 0
DIARRHEA: 0
CHOKING: 0
BACK PAIN: 0
SHORTNESS OF BREATH: 0
COUGH: 0
BACK PAIN: 0
PHOTOPHOBIA: 0
COUGH: 0
COLOR CHANGE: 1
COLOR CHANGE: 0
EYE ITCHING: 0
ABDOMINAL PAIN: 0

## 2019-01-01 ASSESSMENT — PULMONARY FUNCTION TESTS
PIF_VALUE: 0
PIF_VALUE: 15
PIF_VALUE: 0
PIF_VALUE: 1
PIF_VALUE: 0
PIF_VALUE: 1
PIF_VALUE: 15
PIF_VALUE: 0
PIF_VALUE: 19
PIF_VALUE: 26
PIF_VALUE: 25
PIF_VALUE: 0
PIF_VALUE: 17
PIF_VALUE: 0
PIF_VALUE: 17
PIF_VALUE: 0
PIF_VALUE: 4
PIF_VALUE: 0
PIF_VALUE: 16
PIF_VALUE: 0
PIF_VALUE: 17
PIF_VALUE: 0
PIF_VALUE: 0
PIF_VALUE: 16
PIF_VALUE: 0
PIF_VALUE: 0
PIF_VALUE: 1
PIF_VALUE: 1
PIF_VALUE: 0
PIF_VALUE: 1
PIF_VALUE: 0
PIF_VALUE: 15
PIF_VALUE: 0
PIF_VALUE: 1
PIF_VALUE: 0
PIF_VALUE: 15
PIF_VALUE: 0
PIF_VALUE: 17
PIF_VALUE: 4
PIF_VALUE: 16
PIF_VALUE: 0
PIF_VALUE: 1
PIF_VALUE: 0
PIF_VALUE: 1
PIF_VALUE: 0

## 2019-01-01 ASSESSMENT — PAIN SCALES - GENERAL
PAINLEVEL_OUTOF10: 0
PAINLEVEL_OUTOF10: 6
PAINLEVEL_OUTOF10: 4
PAINLEVEL_OUTOF10: 0
PAINLEVEL_OUTOF10: 3
PAINLEVEL_OUTOF10: 0
PAINLEVEL_OUTOF10: 0
PAINLEVEL_OUTOF10: 4
PAINLEVEL_OUTOF10: 0
PAINLEVEL_OUTOF10: 3
PAINLEVEL_OUTOF10: 0
PAINLEVEL_OUTOF10: 4
PAINLEVEL_OUTOF10: 0
PAINLEVEL_OUTOF10: 0
PAINLEVEL_OUTOF10: 7
PAINLEVEL_OUTOF10: 8
PAINLEVEL_OUTOF10: 0
PAINLEVEL_OUTOF10: 0
PAINLEVEL_OUTOF10: 3
PAINLEVEL_OUTOF10: 0
PAINLEVEL_OUTOF10: 3
PAINLEVEL_OUTOF10: 0
PAINLEVEL_OUTOF10: 5
PAINLEVEL_OUTOF10: 3
PAINLEVEL_OUTOF10: 0

## 2019-01-01 ASSESSMENT — PAIN DESCRIPTION - PAIN TYPE
TYPE: CHRONIC PAIN;SURGICAL PAIN
TYPE: CHRONIC PAIN

## 2019-01-01 ASSESSMENT — PAIN DESCRIPTION - LOCATION
LOCATION: ANKLE
LOCATION: BACK;NECK
LOCATION: FOOT
LOCATION: BACK;NECK
LOCATION: ANKLE
LOCATION: ANKLE
LOCATION: FOOT

## 2019-01-01 ASSESSMENT — PATIENT HEALTH QUESTIONNAIRE - PHQ9
SUM OF ALL RESPONSES TO PHQ9 QUESTIONS 1 & 2: 2
2. FEELING DOWN, DEPRESSED OR HOPELESS: 2
SUM OF ALL RESPONSES TO PHQ QUESTIONS 1-9: 2
1. LITTLE INTEREST OR PLEASURE IN DOING THINGS: 0
SUM OF ALL RESPONSES TO PHQ QUESTIONS 1-9: 2

## 2019-01-01 ASSESSMENT — PAIN - FUNCTIONAL ASSESSMENT
PAIN_FUNCTIONAL_ASSESSMENT: 0-10
PAIN_FUNCTIONAL_ASSESSMENT: 0-10

## 2019-01-01 ASSESSMENT — PAIN DESCRIPTION - ORIENTATION
ORIENTATION: LEFT

## 2019-01-01 ASSESSMENT — PAIN DESCRIPTION - DESCRIPTORS: DESCRIPTORS: ACHING

## 2019-01-01 ASSESSMENT — PAIN DESCRIPTION - FREQUENCY: FREQUENCY: INTERMITTENT

## 2019-04-15 NOTE — TELEPHONE ENCOUNTER
Contacted patient due to a no show for his appointment this morning. Patient states that he told someone at podiatry to cancel his appointment. He then told me that he cannot afford to come to [facility] appointments for a $400 bill he can't pay. \" When I attempted to explain to the patient that we do offer financial assistance, and that we could see the patient regardless of his ability to pay, the patient stated \"I don't need no vascular surgeon, and stop bothering me. \" Patient hung up on writer. Writer notified referring provider, spoke to Vivianne Duverney. Will send the referral for the patient back to Dr. Kay Blount office.

## 2019-04-15 NOTE — TELEPHONE ENCOUNTER
Pt no showed for Dr Ivan Yarbrough today. When their office called pt, he stated he didn't need any vascular doctor and hung up on them.

## 2019-04-18 NOTE — PROGRESS NOTES
Follow-Up Wound Progress Note   Dov Sanchez  AGE: 76 y.o. GENDER: male  : 1950  TODAY'S DATE:  2019  Subjective:    Dov Sanchez is a 76 y.o. male who presents today for wound check. Wound Location : Left. The patients pain is 6 out of 10. Patient states that they have not been changing the dressing to the left lower extremity(s) as instructed since last visit. Patient denies any fever, chills, nausea, vomiting, or night sweats. Review of Systems   Constitutional: Negative for activity change, appetite change, chills, diaphoresis, fatigue and fever. Respiratory: Negative for shortness of breath. Cardiovascular: Negative for leg swelling. Gastrointestinal: Negative for diarrhea and nausea. Endocrine: Negative for cold intolerance, heat intolerance and polyuria. Musculoskeletal: Positive for arthralgias. Negative for back pain, gait problem, joint swelling and myalgias. Skin: Positive for wound. Negative for color change, pallor and rash. Allergic/Immunologic: Negative for environmental allergies and food allergies. Neurological: Negative for dizziness, weakness, light-headedness and numbness. Hematological: Does not bruise/bleed easily. Psychiatric/Behavioral: Negative for behavioral problems, confusion and self-injury. The patient is not nervous/anxious. Objective:   Exam:  There is an open wound noted to the medial aspect of the left heel. This wound is full thickness to the depth of the subcutaneous tissue. The wound base is completely fibrous today with some areas of dark necrotic tissue present. There is erythema surrounding the wound, but there is no calor noted. There is no drainage or malodor noted to the wound. The areas of dark necrotic tissue are very soft. There is no tunneling or undermining noted to the wound. The wound measures 2.9 cm x 3.5 cm x 0.2 cm. Procedure:   The wound was not debrided today due to the tenuous appearance of the 4/15/2019        Tammi Weinstein DPM

## 2019-04-22 NOTE — PROGRESS NOTES
Follow-Up Wound Progress Note   Florina Montano  AGE: 76 y.o. GENDER: male  : 1950  TODAY'S DATE:  2019  Subjective:    Florina Montano is a 76 y.o. male who presents today for wound check. Wound Location : Left heel. The patients pain is 6 out of 10. Patient states that they have been changing the dressing to the left lower extremity(s) as instructed since last visit. Patient denies any fever, chills, nausea, vomiting, or night sweats. Review of Systems   Constitutional: Negative for activity change, appetite change, chills, diaphoresis, fatigue and fever. Respiratory: Negative for shortness of breath. Cardiovascular: Negative for leg swelling. Gastrointestinal: Negative for diarrhea and nausea. Endocrine: Negative for cold intolerance, heat intolerance and polyuria. Musculoskeletal: Positive for arthralgias. Negative for back pain, gait problem, joint swelling and myalgias. Skin: Positive for wound. Negative for color change, pallor and rash. Allergic/Immunologic: Negative for environmental allergies and food allergies. Neurological: Negative for dizziness, weakness, light-headedness and numbness. Hematological: Does not bruise/bleed easily. Psychiatric/Behavioral: Negative for behavioral problems, confusion and self-injury. The patient is not nervous/anxious. Objective:   Exam:  There is an open wound noted to the medial aspect of the left ankle. This wound is full thickness to the depth of the subcutaneous tissue. The wound base is a mix of fibrous and granular tissue. Some of this fibrous tissue of this wound is lytic was easily debrided with a curette today. There is erythema surrounding the wound, but there is no calor noted. There is no drainage or malodor noted to the wound. Excisional debridement of the wound with a curette produces bleeding. There is no tunneling or undermining noted to the wound.  The wound measures 2.9 cm x 3.5 cm x 0.2

## 2019-05-23 PROBLEM — K92.2 UPPER GI BLEED: Status: ACTIVE | Noted: 2019-01-01

## 2019-05-23 NOTE — ED PROVIDER NOTES
[facility] [address] [facility]  Emergency Department  Emergency Medicine Resident Sign-out     Care of Fouzia Crawford was assumed from Dr. Kadi Teague and is being seen for Emesis and Extremity Weakness  . The patient's initial evaluation and plan have been discussed with the prior provider who initially evaluated the patient.      EMERGENCY DEPARTMENT COURSE / MEDICAL DECISION MAKING:       MEDICATIONS GIVEN:  Orders Placed This Encounter   Medications    0.9 % sodium chloride bolus    calcium gluconate 3 g in dextrose 5 % 100 mL IVPB    meropenem (MERREM) 1 g in sodium chloride 0.9 % 100 mL IVPB (mini-bag)    glucose (GLUTOSE) 40 % oral gel 15 g    dextrose 50 % solution 12.5 g    glucagon (rDNA) injection 1 mg    dextrose 5 % solution    insulin regular (HUMULIN R;NOVOLIN R) 100 Units in sodium chloride 0.9 % 100 mL infusion    insulin regular (HUMULIN R;NOVOLIN R) injection 10 Units    vancomycin (VANCOCIN) intermittent dosing (placeholder)    vancomycin (VANCOCIN) 1250 mg in dextrose 5 % 250 mL IVPB    sodium bicarbonate 75 mEq in sodium chloride 0.45 % 1,000 mL infusion    0.9 % sodium chloride bolus    lactated ringers bolus       LABS / RADIOLOGY:     Labs Reviewed   CBC WITH AUTO DIFFERENTIAL - Abnormal; Notable for the following components:       Result Value    WBC 27.9 (*)     RBC 2.58 (*)     Hemoglobin 7.1 (*)     Hematocrit 23.5 (*)     Platelets 716 (*)     Seg Neutrophils 95 (*)     Lymphocytes 4 (*)     Eosinophils % 0 (*)     Segs Absolute 26.50 (*)     All other components within normal limits   COMPREHENSIVE METABOLIC PANEL - Abnormal; Notable for the following components:    Glucose 830 (*)      (*)     CREATININE 4.55 (*)     Sodium 129 (*)     Potassium 6.6 (*)     Chloride 79 (*)     CO2 6 (*)     Anion Gap 44 (*)     Total Bilirubin 0.20 (*)     Alb 3.4 (*)     Albumin/Globulin Ratio 0.7 (*)     GFR Non- 13 (*)     GFR  16 (*) All other components within normal limits   LACTATE, SEPSIS - Abnormal; Notable for the following components:    Lactic Acid, Sepsis, Whole Blood 3.7 (*)     All other components within normal limits   MAGNESIUM - Abnormal; Notable for the following components:    Magnesium 3.1 (*)     All other components within normal limits   TROPONIN - Abnormal; Notable for the following components:    Troponin, High Sensitivity 34 (*)     All other components within normal limits   BETA-HYDROXYBUTYRATE - Abnormal; Notable for the following components:    Beta-Hydroxybutyrate 13.66 (*)     All other components within normal limits   CULTURE BLOOD #1   CULTURE BLOOD #1   URINE CULTURE   AMMONIA   PROTIME-INR   APTT   URINALYSIS WITH MICROSCOPIC   TROPONIN   LACTIC ACID, WHOLE BLOOD   POCT GLUCOSE   POCT GLUCOSE   POCT GLUCOSE   POCT GLUCOSE   POCT GLUCOSE   POCT GLUCOSE   TYPE AND SCREEN   PREPARE RBC (CROSSMATCH)       Xr Tibia Fibula Left (2 Views)    Result Date: 5/23/2019  EXAMINATION: 2 XRAY VIEWS OF THE LEFT TIBIA AND FIBULA 5/23/2019 12:49 am COMPARISON: None. HISTORY: ORDERING SYSTEM PROVIDED HISTORY: r/o gas TECHNOLOGIST PROVIDED HISTORY: r/o gas Ordering Physician Provided Reason for Exam: r/o gas Acuity: Unknown Type of Exam: Unknown Acute. Initial examination. FINDINGS: The tibia and fibula are intact. No fracture or osseous destructive lesion. No appreciable soft tissue swelling is identified. No subcutaneous air is identified. No retained radiopaque foreign body. 1. Unremarkable radiographs of the left tibia and fibula. Xr Foot Left (min 3 Views)    1. Soft tissue ulceration along the plantar aspect of the heel. No evidence of osteomyelitis. Underlying infection cannot be excluded. 2. No fracture. Xr Chest Portable    Result Date: 5/22/2019  EXAMINATION: ONE XRAY VIEW OF THE CHEST 5/22/2019 11:20 pm COMPARISON: 02/05/2013.  HISTORY: ORDERING SYSTEM PROVIDED HISTORY: ill TECHNOLOGIST PROVIDED Prescriptions    No medications on file           Renata Jorge MD  Emergency Medicine Resident  [address] [place]  Resident  05/23/19 [id number]

## 2019-05-23 NOTE — ANESTHESIA POSTPROCEDURE EVALUATION
Department of Anesthesiology  Postprocedure Note    Patient: Veronica Mahoney  MRN: [id number]  YOB: 1950  Date of evaluation: 5/23/2019  Time:  3:42 PM     Procedure Summary     Date:  05/23/19 Room / Location:  [facility] OR [address] [place] [address] OR    Anesthesia Start:  1436 Anesthesia Stop:  1510    Procedure:  EGD DIAGNOSTIC ONLY (N/A ) Diagnosis:  (HEMATEMESIS)    Surgeon:  Ariel Guillermo MD Responsible Provider:  Ermelinda Palomino MD    Anesthesia Type:  general ASA Status:  4          Anesthesia Type: No value filed. Danish Phase I: Danish Score: 7    Danish Phase II:      Last vitals: Reviewed and per EMR flowsheets.        Anesthesia Post Evaluation    Patient location during evaluation: PACU  Patient participation: complete - patient participated  Level of consciousness: awake  Pain score: 0  Airway patency: patent  Nausea & Vomiting: no vomiting  Complications: no  Cardiovascular status: hemodynamically stable  Respiratory status: spontaneous ventilation  Hydration status: stable

## 2019-05-23 NOTE — ED PROVIDER NOTES
[facility] ED  Emergency Department Encounter  EmergencyMedicine Resident     Pt Name:Vini Oliva  MRN: [id number]  [place] 1950  Date of evaluation: 5/23/19  PCP:  Teresa Tim MD    [address] [place] [address]       Chief Complaint   Patient presents with    Emesis    Extremity Weakness       HISTORY OF PRESENT ILLNESS  (Location/Symptom, Timing/Onset, Context/Setting, Quality, Duration, Modifying Factors, Severity.)      Jessica Munoz is a 76 y.o. male who presents via EMS. EMS was contacted by the patient's family member. Apparently family hadn't heard from the patient in a few days, so family called EMS to do a wellness check. Upon arrival, they stated the patient was vomiting coffee ground emesis. For them, he was hypotensive with a systolic in the 36M. On arrival to the ED, patient is only requesting water. He is a poor historian. Denies any constitutional complaints. Denies any melenic stool. States that he has been vomiting coffee ground emesis, but couldn't quantify how much. In regards to the sore on his left heel, patient again states that it's been a long time. PAST MEDICAL / SURGICAL / SOCIAL / FAMILY HISTORY      has a past medical history of Depression, Diabetes ([address] DM (diabetes mellitus screen), Hyperlipemia, Insomnia, and Osteoarthritis. has a past surgical history that includes hernia repair and Tonsillectomy.     Social History     Socioeconomic History    Marital status: Single     Spouse name: Not on file    Number of children: Not on file    Years of education: Not on file    Highest education level: Not on file   Occupational History    Not on file   Social Needs    Financial resource strain: Not on file    Food insecurity:     Worry: Not on file     Inability: Not on file    Transportation needs:     Medical: Not on file     Non-medical: Not on file   Tobacco Use    Smoking status: Former Smoker     Packs/day: 2.00     Years: BANDAGE 4\"X2. 5YD) MISC Apply to wound. 4/15/19   Lola Dowd DPM   Soft Lens Products (SALINE) 0.9 % SOLN Apply 1 Bottle topically 2 times daily Apply a wet to dry twice a day. Can also use to cleanse area prior to dressing 4/15/19   Lola Dowd DPM   blood glucose monitor strips Check blood sugars daily as directed. 3/27/19 3/26/20  Trudy Balbuena MD   atorvastatin (LIPITOR) 40 MG tablet Take 1 tablet by mouth daily 3/27/19   Trudy Balbuena MD   gabapentin (NEURONTIN) 300 MG capsule Take 1 capsule by mouth 3 times daily for 180 days. Intended supply: 30 days 3/27/19 9/23/19  Trudy Balbuena MD   Wound Dressings (FIBRACOL) MISC Apply to wound. 3/11/19   Lola Dowd DPM   Continuous Blood Gluc  (FREESTYLE SOFI 14 DAY READER) DONG Use daily 3/7/19   Trudy Balbuena MD   Continuous Blood Gluc Sensor (FREESTYLE SOFI 14 DAY SENSOR) MISC Use daily 3/7/19   Trudy Balbuena MD   lisinopril (PRINIVIL;ZESTRIL) 5 MG tablet Take 1 tablet by mouth daily 3/6/19   Trudy Balbuena MD   diclofenac (VOLTAREN) 75 MG EC tablet Take 1 tablet by mouth 2 times daily (with meals) 3/6/19   Trudy Balbuena MD   insulin glargine (TOUJEO SOLOSTAR) 300 UNIT/ML injection pen Inject 50 Units into the skin nightly 2/27/19   Trudy Balbuena MD   metFORMIN (GLUCOPHAGE) 1000 MG tablet Take 1 tablet by mouth 2 times daily (with meals) 2/27/19   Trudy Balbuena MD   citalopram (CELEXA) 20 MG tablet Take 30 mg by mouth daily     Historical Provider, MD   QUEtiapine (SEROQUEL) 300 MG tablet Take 300 mg by mouth daily     Historical Provider, MD       REVIEW OF SYSTEMS    (2-9 systems for level 4, 10 or more for level 5)      Review of Systems   Constitutional: Negative for chills and fever. HENT: Negative for rhinorrhea and sore throat. Eyes: Negative for pain and visual disturbance. Respiratory: Negative for cough and shortness of breath.     Cardiovascular: Negative for chest pain and palpitations. Gastrointestinal: Positive for vomiting. Negative for abdominal pain and nausea. Genitourinary: Negative for difficulty urinating and dysuria. Musculoskeletal: Negative for arthralgias and myalgias. Skin: Positive for wound. Negative for color change. Neurological: Negative for weakness, numbness and headaches. Psychiatric/Behavioral: Negative for behavioral problems and dysphoric mood. PHYSICAL EXAM   (up to 7 for level 4, 8 or more for level 5)      INITIAL VITALS:   BP (!) 116/49   Pulse 100   Temp 99.1 °F (37.3 °C)   Resp 11   Ht 5' 10\" (1.778 m)   Wt 200 lb (90.7 kg)   SpO2 (!) 77%   BMI 28.70 kg/m²     Physical Exam   Constitutional: He is oriented to person, place, and time. He has a sickly appearance. He appears ill. HENT:   Head: Normocephalic and atraumatic. Mouth/Throat: Oropharynx is clear and moist.   Dry oral mucosa   Eyes: Pupils are equal, round, and reactive to light. EOM are normal.   Neck: Normal range of motion. Cardiovascular: Regular rhythm. Tachycardia present. Pulmonary/Chest: Effort normal. No respiratory distress. He has no wheezes. He has no rales. Abdominal: Soft. There is no tenderness. There is no rebound and no guarding. Genitourinary: Rectal exam shows guaiac negative stool. Musculoskeletal: Normal range of motion. Neurological: He is alert and oriented to person, place, and time. He has normal strength. No sensory deficit. GCS eye subscore is 4. GCS verbal subscore is 5. GCS motor subscore is 6. Skin: Skin is warm and dry.    Necrotic appearing tissue to the heel of the left foot; underneath there appears to be purulent discharge   Psychiatric: His behavior is normal.       DIFFERENTIAL  DIAGNOSIS     PLAN (LABS / IMAGING / EKG):  Orders Placed This Encounter   Procedures    Culture Blood #1    Culture Blood #1    Urine Culture    XR CHEST PORTABLE    XR FOOT LEFT (MIN 3 VIEWS)    XR TIBIA FIBULA LEFT (2 VIEWS)    Ammonia Result Value Ref Range    Protime 11.1 9.0 - 12.0 sec    INR 1.1    APTT   Result Value Ref Range    PTT 24.7 20.5 - 30.5 sec   Troponin   Result Value Ref Range    Troponin, High Sensitivity 34 (H) 0 - 22 ng/L    Troponin T NOT REPORTED <0.03 ng/mL    Troponin Interp NOT REPORTED    BETA-HYDROXYBUTYRATE   Result Value Ref Range    Beta-Hydroxybutyrate 13.66 (H) 0.02 - 0.27 mmol/L       IMPRESSION: Patient presents with concerns for hematemesis. He is a little hypotensive with a systolic blood pressure in the 100s. He is tachycardic with a heart rate in the low 100s. Patient is pale, sickly in appearance. There is concerns for upper GI bleed secondary to gastritis versus peptic ulcer. Also concern for possible lower GI bleed, however, he is guaiac negative. No abnormal heart sounds heard, lungs are clear. Abdomen is soft, nontender. Does have a wound to the left heel concerning for infection. Given his presentation, we'll plan to perform a sepsis workup, obtain appropriate imaging studies and plan for admission. RADIOLOGY:  Xr Foot Left (min 3 Views)    Result Date: 5/22/2019  1. Soft tissue ulceration along the plantar aspect of the heel. No evidence of osteomyelitis. Underlying infection cannot be excluded. 2. No fracture. Xr Chest Portable    Result Date: 5/22/2019  EXAMINATION: ONE XRAY VIEW OF THE CHEST 5/22/2019 11:20 pm COMPARISON: 02/05/2013. HISTORY: ORDERING SYSTEM PROVIDED HISTORY: ill TECHNOLOGIST PROVIDED HISTORY: ill Ordering Physician Provided Reason for Exam: ill Acuity: Unknown Type of Exam: Unknown Acute. Initial evaluation. FINDINGS: The cardiac silhouette and mediastinal contours are obscured by the elevated hemidiaphragms, low lung volumes, and bibasilar atelectasis. Vascular calcifications are noted along the aortic arch. No pleural effusion or pneumothorax. The visualized osseous structures are unremarkable. 1. Marked low lung volumes with bibasilar atelectasis. EKG  None    All EKG's are interpreted by the Emergency Department Physician who either signs or Co-signs this chart in the absence of a cardiologist.    EMERGENCY DEPARTMENT COURSE:  Patient arrived via EMS. Basically in appearance. Pale skin tone. Upon receiving blood work, patient was found to have an elevated white count of 28. Also found to be in DKA with a glucose in the 800s, bicarb less than 6, positive ketones. Also some LAMBERTO with a creatinine of 4.55. Patient was started on broad-spectrum antibiotics including vancomycin and meropenem. Patient states that he had an allergy to penicillin the past, so meropenem was chosen instead. Patient also started on the DKA protocol. Was given 10 units of insulin IV for the hyperkalemia. Patient was also typed and crossed for 4 units of blood. Podiatry was spoken to and will come by and evaluate the patient. Patient hemodynamically stable. Did have a rectal temperature of 94.1°. Spoke with the nurse, plan is to apply a bear hugger for warmth. ICU team has been spoken to. Plan is for admission. Patient care signed out to Dr. Sailaja Anderson. 12:00 AM  Spoke with the nephrology team who recommended starting the patient on half normal saline with 75 mEq of HCO3 at 200 mL an hour. 12:40 AM  Spoke with the podiatry team regarding the patient's foot wound. They stated they will come by and evaluate the patient. Patient has already been started on broad-spectrum antibiotic's.     Sepsis Times and Checklist  Vital Signs: BP: (!) 116/49  Pulse: 100  Resp: 11  Temp: 99.1 °F (37.3 °C) SpO2: (!) 77 %  SIRS (>2)   Temp > 38.3C or < 36C   HR > 90   RR > 20   WBC > 12 or < 4 or >10% bands  SIRS (>2) and confirmed or suspected source of infection = Sepsis    Sepsis Identified   Date: 5/23/19  Time: 12:24 AM   Sepsis Orders:   CBC: Yes   CMP: Yes   PT/PTT: Yes   Blood Cultures x2: Yes   Urinalysis and Urine Culture: Yes   Lactate: Yes   Broad Spectrum Antibiotics Given (within 3 hours of sepsis identification, after blood cultures): Yes              IV Crystalloid given: Yes    If lactate >2.0 MUST repeat within 6 hours    Is lactate > 4.0:  No  If lactate >4.0 OR hypotension 30ml/kg crystalloid MUST be ordered. Fluids must be completed within 3 hours of sepsis identification. PROCEDURES:  None    CONSULTS:  PHARMACY TO DOSE VANCOMYCIN  IP CONSULT TO NEPHROLOGY  IP CONSULT TO PODIATRY  IP CONSULT TO CRITICAL CARE    CRITICAL CARE:  None    FINAL IMPRESSION      1. Hyperkalemia    2. Anemia, unspecified type    3. Diabetic ketoacidosis without coma associated with type 2 diabetes mellitus ([facility]    4.  Ulcer of left foot, unspecified ulcer stage ([facility]          DISPOSITION / PLAN     DISPOSITION Admitted 05/23/2019 12:24:22 AM      PATIENT REFERRED TO:  Trudy Vernon, [address] [place] [address]  [phone]            DISCHARGE MEDICATIONS:  New Prescriptions    No medications on file       Tommy Perez MD  Emergency Medicine Resident    (Please note that portions of thisnote were completed with a voice recognition program.  Efforts were made to edit the dictations but occasionally words are mis-transcribed.)       Good Ramirez MD  Resident  05/23/19 [id number]

## 2019-05-23 NOTE — CONSULTS
Renal Consult Note    Patient :  Christian Chairez; 76 y.o. MRN# [id number]  Location:  7841/4265-86  Attending:  Steve Blount MD  Admit Date:  5/22/2019   Hospital Day: 0    Reason for Consult:     Asked by Dr Steve Blount MD to see for LAMBERTO/Elevated Creatinine. History Obtained From:     patient, non-family caregiver - nursing staff    History of Present Illness:     Christian Chairez; 76 y.o. male with past medical history as mentioned below. Known history of type 2 diabetes, with poor control, hypertension, peripheral vascular disease, nonhealing right foot ulcers, peripheral neuropathy, he also has an element of chronic kidney disease with a baseline creatinine of 1-1 0.1, albumin creatinine ratio in the 80-90 range. His diabetic control and believe has been less than optimal.  Hemoglobin A1c has been in the 10-11 range. Sugars have been running as high as 300 prior to this admission. More recently he was developing a right foot ulcer and tells me he is following up his podiatrist.  He was brought into the hospital by his family members weren't heard from him in a few days. Upon arrival at his place was found to be weak, vomiting coffee-ground emesis, EMS found that his blood pressure was in the 80s. Blood sugar was checked. Remote and 800. Beta hydroxybutyrate was 13 and Was 44 and bicarb was less than 6. refused Lopez on initial admission. Subsequently this morning lopez was placed and additional 800cc of urine was drained almost immediately. His creatinine on presentation was 4.6, potassium was 6.6, DKA protocol was initiated. IV fluids were started. Blood sugars have improved although, continues to have anion gap metabolic acidosis. Dose of vancomycin was given as well today to treat his foot infection. Podiatry has debrided the wound. With fluid resuscitation his hemodynamics have improved as well. Hemoglobin was down to 6 which is now improving after transfusion.   ANDREA tube has been placed coffee-ground emesis being drained. Nephrology has been consulted for acute kidney injury, azotemia with a , hyperkalemia which is now resolved. Reveals no medications and see the patient has been on NSAIDs, lisinopril and metformin. Lactic acid slightly high at 3.7 on admission without metformin toxicity. No history of recent contrast exposure, No h/o nephrolithiasis, No recent skin rashes or arthralgias, No hematuria or pyuria noticed in the recent past. Doesn't report any reduction in the urine output recently. Non report of any obstructive urinary symptoms (urgency, frequency, weak stream, straining while urination). No h/o recurrent UTIs in the past.    Past History/Allergies? Social History:     Past Medical History:   Diagnosis Date    Depression     Diabetes ([facility]     DM (diabetes mellitus screen)     Hyperlipemia     Insomnia     Osteoarthritis        Allergies   Allergen Reactions    Keflex [Cephalexin] Hives       Social History     Socioeconomic History    Marital status: Single     Spouse name: Not on file    Number of children: Not on file    Years of education: Not on file    Highest education level: Not on file   Occupational History    Not on file   Social Needs    Financial resource strain: Not on file    Food insecurity:     Worry: Not on file     Inability: Not on file    Transportation needs:     Medical: Not on file     Non-medical: Not on file   Tobacco Use    Smoking status: Former Smoker     Packs/day: 2.00     Years: 25.00     Pack years: 50.00     Types: Cigarettes     Last attempt to quit: 1995     Years since quittin.8    Smokeless tobacco: Never Used   Substance and Sexual Activity    Alcohol use: No     Alcohol/week: 0.0 oz     Comment: occassionally    Drug use: No    Sexual activity: Not on file   Lifestyle    Physical activity:     Days per week: Not on file     Minutes per session: Not on file    Stress: Not on file Relationships    Social connections:     Talks on phone: Not on file     Gets together: Not on file     Attends [employer] service: Not on file     Active member of club or organization: Not on file     Attends meetings of clubs or organizations: Not on file     Relationship status: Not on file    Intimate partner violence:     Fear of current or ex partner: Not on file     Emotionally abused: Not on file     Physically abused: Not on file     Forced sexual activity: Not on file   Other Topics Concern    Not on file   Social History Narrative    Not on file       Family History:        Family History   Problem Relation Age of Onset    Alzheimer's Disease Mother     Arthritis Mother     Diabetes Father     Heart Disease Father     Heart Disease Paternal Grandfather        Outpatient Medications:     Medications Prior to Admission: Soft Lens Products (STERILE SALINE) SOLN, Apply to wound. Cleanse wound than apply as a wet to dry dressing. Gauze Pads & Dressings (GAUZE DRESSING) 4\"X4\" PADS, Apply to wound. Gauze Bandages (ROLLED GAUZE BANDAGE 4\"X2. 5YD) MISC, Apply to wound. collagenase (SANTYL) 250 UNIT/GM ointment, Apply topically daily. Gauze Pads & Dressings (GAUZE DRESSING) 4\"X4\" PADS, Apply to wound. Gauze Bandages (ROLLED GAUZE BANDAGE 4\"X2. 5YD) MISC, Apply to wound. Soft Lens Products (SALINE) 0.9 % SOLN, Apply 1 Bottle topically 2 times daily Apply a wet to dry twice a day. Can also use to cleanse area prior to dressing  blood glucose monitor strips, Check blood sugars daily as directed. atorvastatin (LIPITOR) 40 MG tablet, Take 1 tablet by mouth daily  gabapentin (NEURONTIN) 300 MG capsule, Take 1 capsule by mouth 3 times daily for 180 days. Intended supply: 30 days  Wound Dressings (FIBRACOL) MISC, Apply to wound.   Continuous Blood Gluc  (FREESTYLE SOFI 14 DAY READER) DONG, Use daily  Continuous Blood Gluc Sensor (FREESTYLE SOFI 14 DAY SENSOR) MISC, Use daily  lisinopril Phosphorus:     Recent Labs     05/23/19  0549   PHOS 5.4*     Magnesium:    Recent Labs     05/22/19  2256 05/23/19  0240 05/23/19  0549   MG 3.1* 2.4 2.5     Albumin:    Recent Labs     05/22/19 2256   LABALBU 3.4*     BNP:      Lab Results   Component Value Date    BNP 43 02/05/2013     IKER:    No results found for: IKER  SPEP:  Lab Results   Component Value Date    PROT 8.1 05/22/2019     UPEP:   No results found for: LABPE  C3:   No results found for: C3  C4:   No results found for: C4  MPO ANCA:   No results found for: MPO  PR3 ANCA:   No results found for: PR3  Anti-GBM:   No results found for: GBMABIGG  Hep BsAg:         Lab Results   Component Value Date    HEPBSAG NONREACTIVE 03/29/2016     Hep C AB:          Lab Results   Component Value Date    HEPCAB NONREACTIVE 03/29/2016       Urinalysis/Chemistries:      Lab Results   Component Value Date    NITRU NEGATIVE 02/05/2013    COLORU YELLOW 02/05/2013    PHUR 5.0 02/05/2013    WBCUA None 02/05/2013    RBCUA 5 TO 10 02/05/2013    MUCUS NOT REPORTED 02/05/2013    TRICHOMONAS NOT REPORTED 02/05/2013    YEAST NOT REPORTED 02/05/2013    BACTERIA FEW 02/05/2013    SPECGRAV 1.025 02/05/2013    LEUKOCYTESUR NEGATIVE 02/05/2013    UROBILINOGEN Normal 02/05/2013    BILIRUBINUR NEGATIVE 02/05/2013    GLUCOSEU 3+ 02/05/2013    KETUA 3+ 02/05/2013    AMORPHOUS NOT REPORTED 02/05/2013     Urine Sodium:   No results found for: VASHTI  Urine Potassium:  No results found for: KUR  Urine Chloride:  No results found for: CLUR  Urine Osmolarity: No results found for: OSMOU  Urine Protein:   No results found for: TPU  Urine Creatinine:     Lab Results   Component Value Date    LABCREA 125.6 02/27/2019     UPC:     Urine Eosinophils:  No components found for: UEOS    Radiology:     CXR:     Assessment:     1.  Acute Kidney Injury: Secondary to ischemic ATN from intravascular volume depletion related to osmotic diuresis from diabetic ketoacidosis, hypotension, low flow, sepsis/systemic inflammation response syndrome, baseline creatinine 1-1.1 which is now up to 4.5, nonoliguric after Mcintosh was placed. Element of obstructive uropathy not ruled out. Appears to be resolving, acute kidney injury stage III  2. Chronic kidney disease stage III Baseline 1-1.1 and non-nephrotic proteinuria does not follow up with nephrology  3. Diabetic ketoacidosis likely triggered by sepsis on insulin infusion  4. Severe anion gap metabolic acidosis secondary to septic ketoacidosis and element of lactic acidosis  5. Nonhealing right foot ulcer leading to systemic inflammatory response syndrome with antibiotics  6. Upper GI bleed likely stress-induced, hemoglobin dropped down to 6 range being transfused  7. Type 2 diabetes with poor control hemoglobin A1c that-11 range  8. Essential hypertension  9. Hyperkalemia versus other shift resolved  10. Azotemia from acute kidney injury expected to recover    Plan:   1. As patient is demonstrating significant metabolic acidosis which is not responded completely to IV fluids. Element of non-gap metabolic acidosis from acute kidney injury, lactic acidosis in addition to ketoacidosis considered. Given that her mind will start him on Spiriva 150 mg of sodium bicarbonate at 125 an hour, follow lytes closely, once anion gap closes and switch him back to saline  2. Continue with Mcintosh catheter  3. .  With insulin infusion  4. Keep systolic pressure more than 110  5. Consider alternatives to vancomycin to avoid further nephrotoxicity  6. Intake of regular daily weights   7. Will Check Renal Ultrasound to r/o element of obstruction and to assess the kidney size/echotexture. 8. Comprehensive urine testing including Urinalysis, Urine sodium, potassium, chloride, Urine protein and creatinine to quantify the proteinuria if any at all. Will check urinary eosinophils as well.   9. Will Order serum and urine protein electrophoresis to r/o element to occult paraprotein disease. 10. Will order IKER, ANCA, Complement levels. 11 no need for renal replacement therapy. Nutrition   Please ensure that patient is on a renal diet/TF. Avoid nephrotoxic drugs/contrast exposure. Thank you for the consultation. Please do not hesitate to contact us for any further questions/concerns. We will continue to follow along with you.

## 2019-05-23 NOTE — PROGRESS NOTES
Pharmacy Note  Vancomycin Consult    Mehdi Abdi is a 76 y.o. male started on Vancomycin for ulcer of left foot; consult received from Dr. Andrade Archibald to manage therapy. Also receiving the following antibiotics: meropenem X1. Patient Active Problem List   Diagnosis    Pure hypercholesterolemia    Hypertriglyceridemia    Neuropathy    Essential hypertension    Type 2 diabetes mellitus with insulin therapy ([facility]    Skin ulcer ([facility]    Hammer toe of right foot    DM type 2, uncontrolled, with neuropathy ([facility]    Upper GI bleed       Allergies:  Keflex [cephalexin]     Temp max: 99.1 F  (37.3 C)    Recent Labs     05/22/19 2256   *       Recent Labs     05/22/19 2256   CREATININE 4.55*       Recent Labs     05/22/19 2256   WBC 27.9*       No intake or output data in the 24 hours ending 05/23/19 0257    Culture Date      Source                       Results       Ht Readings from Last 1 Encounters:   05/22/19 5' 10\" (1.778 m)        Wt Readings from Last 1 Encounters:   05/22/19 200 lb (90.7 kg)         Body mass index is 28.7 kg/m². Estimated Creatinine Clearance: 18 mL/min (A) (based on SCr of 4.55 mg/dL (H)). Goal Trough Level: 10-20 mcg/mL    Assessment/Plan:  Will initiate vancomycin 1250 mg IV once. Timing of trough level will be determined based on culture results, renal function, and clinical response. Thank you for the consult. Will continue to follow.

## 2019-05-23 NOTE — ED PROVIDER NOTES
Dr Gandhi Skill sign out, dka, sepsis, confusion,   Fluids & abx given, icu >>> admit     Nessa Porter,   05/23/19 [id number]

## 2019-05-23 NOTE — PROGRESS NOTES
Progress Note  Podiatric Medicine and Surgery     Subjective     CC: Left foot pain and ulcer    Patient seen and examined at bedside. Restraints in place  Afebrile, vital signs stable   Relates nausea and vomiting  NG tube in place  Mcintosh placed this am  Currently receiving prbc transfusions  Per PA - EGD today    HPI :  Robbie Bowens is a 76 y.o. male seen at [facility] ED for evaluation of left foot ulcer. The patient has been worked up and labs and vitals indicated that he is Septic, in DKA, and may have possible GI bleed. He reports weakness for many weeks which is why he was unable to follow up for his heel. He states that he was unable to get up and therefore his daughter requested that he go to the ER. He states he was performing dressing changes as instructed. He was seeing Dr. Herve Godinez however states he would like to see a new podiatrist at this time. Plan is for admission to ICU. Patient crossed for 4 units. ROS: Denies N/V/F/C/SOB/CP. Otherwise negative except at stated in the HPI.      Medications:  Scheduled Meds:   lidocaine   Topical Once    citalopram  30 mg Oral Daily    QUEtiapine  300 mg Oral Daily    atorvastatin  40 mg Oral Daily    sodium chloride flush  10 mL Intravenous 2 times per day    piperacillin-tazobactam  3.375 g Intravenous Q8H    gabapentin  100 mg Oral TID    sodium chloride  250 mL Intravenous Once    sodium chloride  250 mL Intravenous Once    sodium chloride  250 mL Intravenous Once       Continuous Infusions:   pantoprozole (PROTONIX) infusion 8 mg/hr (05/23/19 1200)    IV infusion builder 125 mL/hr at 05/23/19 1222    insulin (HUMAN R) non-weight based infusion 15.3 Units/hr (05/23/19 1221)    dextrose         PRN Meds:sodium chloride flush, magnesium hydroxide, ondansetron, senna, acetaminophen, glucose, dextrose, glucagon (rDNA), dextrose    Objective     Vitals:  Patient Vitals for the past 8 hrs:   BP Temp Temp src Pulse Resp SpO2 Height Weight   19 1200 -- 98.2 °F (36.8 °C) Oral -- -- -- -- --   19 1046 -- 98.2 °F (36.8 °C) Oral -- -- -- -- --   19 1027 -- -- -- -- 17 99 % -- --   19 1000 (!) 159/55 -- -- 113 19 94 % -- --   19 0932 -- 97.7 °F (36.5 °C) Oral -- -- -- -- --   19 0900 (!) 82/36 -- -- 119 21 (!) 37 % -- --   19 0800 (!) 108/48 -- Oral 108 20 99 % -- --   19 0700 (!) 111/51 98.2 °F (36.8 °C) Oral 116 21 -- 5' 10\" (1.778 m) --   19 0644 -- 98.7 °F (37.1 °C) Oral -- -- -- -- --   19 0600 (!) 103/49 -- -- 114 19 -- -- --   19 0500 110/66 -- -- 106 18 -- 5' 10\" (1.778 m) 198 lb 6.6 oz (90 kg)     Average, Min, and Max for last 24 hours Vitals:  TEMPERATURE:  Temp  Av.1 °F (36.7 °C)  Min: 97.5 °F (36.4 °C)  Max: 99.1 °F (37.3 °C)    RESPIRATIONS RANGE: Resp  Av.9  Min: 11  Max: 29    PULSE RANGE: Pulse  Av.4  Min: 100  Max: 119    BLOOD PRESSURE RANGE:  Systolic (88FXP), TRU:906 , Min:82 , PHW:216   ; Diastolic (21ACO), HSI:04, Min:36, Max:109      PULSE OXIMETRY RANGE: SpO2  Av.7 %  Min: 37 %  Max: 100 %    I/O last 3 completed shifts:  In: -   Out: 500 [Urine:500]    CBC:  Recent Labs     19  0549 19  0953 19  1227   WBC 27.9* 21.7*  --   --    HGB 7.1* 6.7*  6.7*  --  8.7*   HCT 23.5* 22.4*  22.1*  --  26.4*   * 376  --   --    .0*  --  150.6*  --         BMP:  Recent Labs     19  0240 19  0549   * 128* 129*   K 6.6* 6.2* 5.2   CL 79* 87* 88*   CO2 6* 7* 10*   * 99* 106*   CREATININE 4.55* 3.64* 3.41*   GLUCOSE 830* 611* 538*   CALCIUM 9.3 8.1* 8.6        Coags:  Recent Labs     19   APTT 24.7   PROT 8.1   INR 1.1       Lab Results   Component Value Date    SEDRATE >130 (H) 2019     Recent Labs     19  0953   .0* 150.6*       Lower Extremity Physical Exam:  Vascular: DP and PT pulses are non palpable.  CFT <5 seconds to all digits. Hair growth is absent to the level of the digits. Mild edema to left heel.       Neuro: Saph/sural/SP/DP/plantar sensation absent to light touch.     Musculoskeletal: Muscle strength is 4/5 to all lower extremity muscle groups. Gross deformity is present with cavus foot structure and hammer digits 2-5. +POP to the left medial heel.      Dermatologic: Full thickness ulcer #1 located medial left heel and measures approximately 6.0cm x 6.0cm x 0.5cm. Superficially there is a boggy eschar with pet hair adhered to eschar. Base is necrotic. Periwound skin is macerated. Sanguinous purulent drainage noted with significant associated mal odor. Mild erythema present to periwound with associated increase in warmth. Does not probe to bone, sinus track, or undermine. No fluctuance, crepitus, or induration. Interdigital maceration absent.      HPK to distal tuft of R 3rd digit. Post debridement, negative probe to bone, approximately 1-2 cc sanguinous purulent drainage expressed. No sinus track, minimal undermining     Post debridement 5/23/19        Imaging:   US RENAL COMPLETE   Final Result   *Right renal cyst of otherwise negative renal ultrasound. *Limited assessment of the bladder with Mcintosh catheter in place without   evidence of gross abnormality. XR TIBIA FIBULA LEFT (2 VIEWS)   Final Result   1. Unremarkable radiographs of the left tibia and fibula. XR FOOT LEFT (MIN 3 VIEWS)   Final Result   1. Soft tissue ulceration along the plantar aspect of the heel. No evidence   of osteomyelitis. Underlying infection cannot be excluded. 2. No fracture. XR CHEST PORTABLE   Final Result   1. Marked low lung volumes with bibasilar atelectasis. Assessment   Dov Sanchez is a 76 y.o. male with   1. Sepsis  2. Wet gangrene of left medial heel  3. Diabetic ketoacidosis  4. Acute renal failure  5. Hyperkeratosis of R 3rd digit  6.  Uncontrolled diabetes mellitus with peripheral neuropathy     Principal Problem:    Upper GI bleed  Active Problems:    Essential hypertension    DM type 2, uncontrolled, with neuropathy ([facility] [address]  Resolved Problems:    * No resolved hospital problems. *       Plan     · Patient examined and evaluated at bedside   · Treatment options discussed in detail with the patient  · ICU - medical management   · Gastroenterology following  · Neph following  · Discussed risks and benefits to bedside debridement L foot. Patient amenable to procedure. Consent obtained and witness by nursing staff. Correct side marked. Procedure in detail on separate note. · On IV vanc/zosyn - Recommend ID consult for abx recommendations  · PVR reviewed - JANNETTE 1.2 R 0.96 L; moderate occlusive disease L w/ excercise  · Cx - many GPC and GNR  · XR of Right foot and Tib/Fib reviewed - no evidence of soft tissue emphysema, areas of soft tissue defects corresponding with wound noted to posterior heel. · WBC and CRP improving. Will monitor on local wound care and IV abx for improvement. Possible debridement if unimproved or worsening. · Dressing changed to left lower extremity: betadine wtd dsd, light ace  · Discussed with Dr. Purnima Flower, DPM   Podiatric Medicine & Surgery   5/23/2019 at 12:39 PM    I performed a history and physical examination of the patient and discussed management with the resident. I reviewed the residents note and agree with the documented findings and plan of care. Any areas of disagreement are noted on the chart. I was personally present for the key portions of any procedures. I have documented in the chart those procedures where I was not present during the key portions. I have reviewed the Podiatry Resident progress note. I agree with the chief complaint, past medical history, past surgical history, allergies, medications, social and family history as documented unless otherwise noted below.  Documentation of the HPI, Physical Exam

## 2019-05-23 NOTE — CARE COORDINATION
Case Management Initial Discharge Plan  Constantino Roblero,             Met with:patient to discuss discharge plans. Information verified: address, contacts, phone number, , insurance Yes  PCP: Hank Casper MD  Date of last visit: next appt     Insurance Provider: [facility] [employer]    Discharge Planning    Living Arrangements: pt lives alone in a one story home     Support Systems: sister, dtr, grdtr      Patient able to perform ADL's: Pt stated he is having a difficult time completing ADLs, grdtr does help with some house cleaning    Current Services (outpatient & in home) [facility] [place] [facility]. recently, ended few weeks ago  DME equipment: cane, walker  DME provider:     Ph[facility] Medications: No    Does patient want to participate in local refill/ meds to beds program?       Potential Assistance Needed: HC vs SNF      Patient agreeable to home care: Yes, would like [facility]  [place] Part of choice provided:  yes    Prior SNF/Rehab Placement and Facility:No  Agreeable to SNF/Rehab: Yes, if family will care for his pets  Freedom of choice provided: yes   Evaluation: no    Expected Discharge date:     Patient expects to be discharged to: Follow Up Appointment: Best Day/ Time:      Transportation provider: drives  Transportation arrangements needed for discharge: Yes    Readmission Risk              Risk of Unplanned Readmission:        16             Does patient have a readmission risk score greater than 14?: Yes  If yes, follow-up appointment must be made within 7 days of discharge. Discharge Plan: [facility] vs SNF  Pt only agreeable to SNF if family will care for his 3 cats, 3 kittens and 1 dog.   Pt to discuss with family when they visit          Electronically signed by KARL Walker LISW on 19 at 10:13 AM

## 2019-05-23 NOTE — FLOWSHEET NOTE
made initial visit while rounding. Upon entry, patient was resting in bed, but awake. He was difficult to understand, but open to the visit. When  asked why he was in the hospital,  patient stated that his blood sugar was over 700 and he became unresponsive. When his daughter could not reach him by phone, she called 911. He is not a Latter-day man, but does believe in God.  asked what he believed about Eddi Lara. Patient said he thought of Quinten as a martyr, rather than Savior.  shared about Quinten dying for his sins. Patient feels he has committed too many sins to be forgiven.  reminded him of the thief on the cross. Patient knew what  was talking about, so he has some knowledge of the Bible.  challenged him to accept forgiveness that Quinten paid for on the cross and reminded him that God was certainly watching over him, otherwise he would be dead. 05/23/19 3773   Encounter Summary   Services provided to: Patient   Referral/Consult From: [address] [place] [address] Children;Family members   Place of Baptism none   Continue Visiting   (5/23/19)   Complexity of Encounter Low   Length of Encounter 15 minutes   Spiritual Assessment Completed Yes   Routine   Type Initial   Spiritual/Christian   Type Spiritual support   Assessment Calm; Approachable;Coping;Peaceful   Intervention Active listening;Explored feelings, thoughts, concerns;Explored coping resources;Nurtured hope;Prayer;Scripture; Discussed relationship with God;Discussed belief system/Latter-day practices/sagar   Outcome Expressed gratitude;Engaged in conversation; Shared life review;Expressed feelings/needs/concerns;Expressed regrets; Receptive     Patient accepted 's offer of prayer. Chaplains will remain available for further support as needed.     [address] [place] [address]

## 2019-05-23 NOTE — OP NOTE
PROCEDURE NOTE    DATE OF PROCEDURE: 5/23/2019     SURGEON: Robert Mcpherson MD  Facility: [facility]  ASSISTANT: None  Anesthesia: Mac  PREOPERATIVE DIAGNOSIS:     coffee ground emesis . Drop in HGB     Diagnosis:  The entire esophagus is severely inflamed with white to black d/c , not sure if there is related to caustic ingestion or a combination of Candida esophagitis and reflux esophagitis, did not want to biopsy and cause bleeding   the stomach is  Clean  , no blood fresh or old   NGT was removed before the procedure and replaced under diretc visualization   After we were done      POSTOPERATIVE DIAGNOSIS: As described below    OPERATION: Upper GI endoscopy with Biopsy    ANESTHESIA: Moderate Sedation     ESTIMATED BLOOD LOSS: Less than 50 ml    COMPLICATIONS: None. SPECIMENS:  Was Not Obtained    HISTORY: The patient is a 76y.o. year old male with history of above preop diagnosis. I recommended esophagogastroduodenoscopy with possible biopsy and I explained the risk, benefits, expected outcome, and alternatives to the procedure. Risks included but are not limited to bleeding, infection, respiratory distress, hypotension, and perforation of the esophagus, stomach, or duodenum. Patient understands and is in agreement. PROCEDURE: The patient was given IV conscious sedation. The patient's SPO2 remained above 90% throughout the procedure. The gastroscope was inserted orally and advanced under direct vision through the esophagus, through the stomach, through the pylorus, and into the descending duodenum. Post sedation note : The patient's SPO2 remained above 90% throughout the procedure. the vital signs remained stable , and no immediate complication form the procedure noted, patient will be ready for d/c when criteria is met .       Findings:     NGT was removed before the procedure and replaced under diretc visualization   After we were done         Retropharyngeal area was grossly normal appearing    Esophagus: abnormal: The entire esophagus is severely inflamed with white to black d/c , not sure if there is related to caustic ingestion or a combination of Candida esophagitis and reflux esophagitis, did not want to biopsy and cause bleeding        Stomach:    the stomach is  Clean  , no blood fresh or old  Mild gastritis     Duodenum:     Descending: normal    Bulb: normal    The scope was removed and the patient tolerated the procedure well. Recommendations/Plan:   1. Diflucan  2. PPI   3. Carafate liquid   4.  Monitor very closely for complications and perforation     Electronically signed by Robert Mcpherson MD  on 5/23/2019 at 2:58 PM

## 2019-05-23 NOTE — PROGRESS NOTES
Nutrition Assessment    Type and Reason for Visit: Initial, Positive Nutrition Screen(Weight Loss, Poor Intake/Appetite, Wounds)    Nutrition Recommendations:   - Continue NPO. Monitor for start of oral diet - suggest a goal Carb Control diet with Glucerna (diabetic) oral supplements. - Monitor nausea/vomiting, labs, and plan of care. Nutrition Assessment: Pt nutritionally compromised as evidenced by non-healing diabetic ulcer and NPO diet. Pt admitted with reports of coffee-ground emesis. Elevated blood sugars upon admission - remain elevated today at 287-538 mg/dL. Pt continues to c/o nausea and had some vomiting. NG placed with 900 mL output. Pt off floor having an EGD completed. Will provide oral supplements with meals as diet started. Malnutrition Assessment:  · Malnutrition Status: Insufficient data  · Context: (Acute on Chronic)  · Findings of the 6 clinical characteristics of malnutrition (Minimum of 2 out of 6 clinical characteristics is required to make the diagnosis of moderate or severe Protein Calorie Malnutrition based on AND/ASPEN Guidelines):  1. Energy Intake-Unable to assess  2. Weight Loss-5% loss or greater, (in 2 months)  3. Fat Loss-Unable to assess  4. Muscle Loss-Unable to assess  5. Fluid Accumulation-Mild fluid accumulation    Nutrition Risk Level: High    Nutrient Needs:  · Estimated Daily Total Kcal: 4945-4705 kcal/day  · Estimated Daily Protein (g): 75-90 gm pro/day    Nutrition Diagnosis:   · Problem: Inadequate oral intake  · Etiology: related to Nausea, Vomiting, Appetite     Signs and symptoms:  as evidenced by Diet history of poor intake, NPO status due to medical condition    Objective Information:  · Nutrition-Focused Physical Findings: NG in place.   · Wound Type: Diabetic Ulcer(to left heel)  · Current Nutrition Therapies:  · Oral Diet Orders: NPO   · Anthropometric Measures:  · Ht: 5' 10\" (177.8 cm)   · Current Body Wt: 198 lb 6.6 oz (90 kg)  · Admission Body Wt: 198 lb 6.6 oz (90 kg)  · % Weight Change:  ,  7.9% x 2 months per EHR review  · Ideal Body Wt: 165 lb 12.6 oz (75.2 kg), % Ideal Body 120%  · BMI Classification: BMI 25.0 - 29.9 Overweight    Nutrition Interventions:   Continue NPO - As able, suggest a Carb Control diet with Glucerna (diabetic) ONS. Continued Inpatient Monitoring, Education Not Indicated    Nutrition Evaluation:   · Evaluation: Goals set   · Goals: Meet % of estimated nutrition needs once nutrition started.     · Monitoring: Nutrition Progression, Skin Integrity, Wound Healing, I&O, Weight, Pertinent Labs, Monitor Hemodynamic Status, Monitor Bowel Function    Electronically signed by Tam Best RD, LD on 5/23/19 at 3:48 PM    Contact Number: [phone]

## 2019-05-23 NOTE — PROGRESS NOTES
Pt rcvd 0730 alert and orientated *4, pt c/o nausea. Pt had projectile kamila red blood vomitting *2, 18F NG tube placed to right nare. Pt had indwelling lopez cath placed, with immediate output of 700ml. Pt placed in soft wrist restraints *2 to b/l wrist after pt grabbed nurse and tube to prevent care. Pt rcvd 3units PRBC's w/no s/s of reaction noted. Pt taken to Endoscopy @1430 in OR. Pt insulin gtt titrated per protocol. pls see emr for detailed assessment.

## 2019-05-23 NOTE — CONSULTS
[facility]  Consultation Note     . REASON FOR CONSULTATION:    Low Hgb, coffee ground emesis    HISTORY OF PRESENT ILLNESS:     This is a 76 y.o. male who presents with coffee ground emesis witnessed per EMS who were called to the house for a wellness check. Upon arrival he was pale and hypotensive. In the ER he was found to be in DKA, has a non-healing foot/ankle wound and in LAMBERTO with retention, CR 4.55 and was started on a bicarb drop. His Hgb was 7.1 and dropped to 6.7 for which he is receiving 2 units of PRBC's, was started on Protonix drip. Leukocytosis is noted with WBC's at 27.9-pt received antibiotics    This am, he was still hypotensive, tachycardic, anxious. He began to vomit dark red blood approx 400 ml, had NG tube placed and immediately had another 400 ml out. Pt denies any abdominal pain-on having hiccups and nausea. Pt denies use of alcohol, drugs, or NSAIDS. No h/o Cirrhosis or liver dx. No previous h/o bleeding    Pt is not the most reliable historian    Podiatry cx for foot wound and will be doing bedside debridement  Nephrology was cx     Previous GI history:   None on file-pt denies previous endoscopies    PAST MEDICAL HISTORY:  Past Medical History:   Diagnosis Date    Depression     Diabetes ([facility] [address]     DM (diabetes mellitus screen)     Hyperlipemia     Insomnia     Osteoarthritis      Past Surgical History:   Procedure Laterality Date    HERNIA REPAIR      TONSILLECTOMY         ALLERGIES:  Allergies   Allergen Reactions    Keflex [Cephalexin] Hives       HOME MEDICATIONS:  Prior to Admission medications    Medication Sig Start Date End Date Taking? Authorizing Provider   Soft Lens Products (STERILE SALINE) SOLN Apply to wound. Cleanse wound than apply as a wet to dry dressing. 4/26/19   Kaylyn Jeans, DPM   Gauze Pads & Dressings (GAUZE DRESSING) 4\"X4\" PADS Apply to wound.  4/26/19   Kaylyn Jeans, DPM   Gauze Bandages (ROLLED GAUZE BANDAGE 4\"X2. 5YD) MISC Apply to wound. 4/26/19   Lee Mireles, LEO   collagenase (SANTYL) 250 UNIT/GM ointment Apply topically daily. 4/15/19   Dala Hemp, DPM   Gauze Pads & Dressings (GAUZE DRESSING) 4\"X4\" PADS Apply to wound. 4/15/19   Dala Hemp, DPM   Gauze Bandages (ROLLED GAUZE BANDAGE 4\"X2. 5YD) MISC Apply to wound. 4/15/19   Dala Hemp, DPM   Soft Lens Products (SALINE) 0.9 % SOLN Apply 1 Bottle topically 2 times daily Apply a wet to dry twice a day. Can also use to cleanse area prior to dressing 4/15/19   Luis Ma Hemp, DPMANUEL   blood glucose monitor strips Check blood sugars daily as directed. 3/27/19 3/26/20  Trudy Mcfarlane MD   atorvastatin (LIPITOR) 40 MG tablet Take 1 tablet by mouth daily 3/27/19   Trudy Mcfarlane MD   gabapentin (NEURONTIN) 300 MG capsule Take 1 capsule by mouth 3 times daily for 180 days. Intended supply: 30 days 3/27/19 9/23/19  Trudy Mcfarlane MD   Wound Dressings (FIBRACOL) MISC Apply to wound.  3/11/19   Hu Hemp, DPMANUEL   Continuous Blood Gluc  (FREESTYLE SOFI 14 DAY READER) DONG Use daily 3/7/19   Trudy Mcfarlane MD   Continuous Blood Gluc Sensor (FREESTYLE SOFI 14 DAY SENSOR) MISC Use daily 3/7/19   Trudy Mcfarlane MD   lisinopril (PRINIVIL;ZESTRIL) 5 MG tablet Take 1 tablet by mouth daily 3/6/19   Trudy Mcfarlane MD   diclofenac (VOLTAREN) 75 MG EC tablet Take 1 tablet by mouth 2 times daily (with meals) 3/6/19   Trudy Mcfarlane MD   insulin glargine (TOUJEO SOLOSTAR) 300 UNIT/ML injection pen Inject 50 Units into the skin nightly 2/27/19   Trudy Mcfarlane MD   metFORMIN (GLUCOPHAGE) 1000 MG tablet Take 1 tablet by mouth 2 times daily (with meals) 2/27/19   Trudy Mcfarlane MD   citalopram (CELEXA) 20 MG tablet Take 30 mg by mouth daily     Historical Provider, MD   QUEtiapine (SEROQUEL) 300 MG tablet Take 300 mg by mouth daily     Historical Provider, MD     .  CURRENT MEDICATIONS:  Scheduled Meds:  [facility] vancomycin (VANCOCIN) intermittent dosing (placeholder)   Other RX Placeholder    lidocaine   Topical Once    citalopram  30 mg Oral Daily    QUEtiapine  300 mg Oral Daily    atorvastatin  40 mg Oral Daily    sodium chloride flush  10 mL Intravenous 2 times per day    piperacillin-tazobactam  3.375 g Intravenous Q8H    gabapentin  100 mg Oral TID    sodium chloride  250 mL Intravenous Once    sodium chloride  250 mL Intravenous Once    pantoprazole (PROTONIX) bolus  80 mg Intravenous Once    lidocaine  10 mL Intradermal Once     . Continuous Infusions:   pantoprozole (PROTONIX) infusion      dextrose      insulin (HUMAN R) non-weight based infusion 0.1 Units/kg/hr (19 0720)     . PRN Meds:sodium chloride flush, magnesium hydroxide, ondansetron, senna, acetaminophen, glucose, dextrose, glucagon (rDNA), dextrose  .   SOCIAL HISTORY:     Social History     Socioeconomic History    Marital status: Single     Spouse name: Not on file    Number of children: Not on file    Years of education: Not on file    Highest education level: Not on file   Occupational History    Not on file   Social Needs    Financial resource strain: Not on file    Food insecurity:     Worry: Not on file     Inability: Not on file    Transportation needs:     Medical: Not on file     Non-medical: Not on file   Tobacco Use    Smoking status: Former Smoker     Packs/day: 2.00     Years: 25.00     Pack years: 50.00     Types: Cigarettes     Last attempt to quit: 1995     Years since quittin.8    Smokeless tobacco: Never Used   Substance and Sexual Activity    Alcohol use: No     Alcohol/week: 0.0 oz     Comment: occassionally    Drug use: No    Sexual activity: Not on file   Lifestyle    Physical activity:     Days per week: Not on file     Minutes per session: Not on file    Stress: Not on file   Relationships    Social connections:     Talks on phone: Not on file     Gets together: Not on file Attends [employer] service: Not on file     Active member of club or organization: Not on file     Attends meetings of clubs or organizations: Not on file     Relationship status: Not on file    Intimate partner violence:     Fear of current or ex partner: Not on file     Emotionally abused: Not on file     Physically abused: Not on file     Forced sexual activity: Not on file   Other Topics Concern    Not on file   Social History Narrative    Not on file       FAMILY HISTORY:   Family History   Problem Relation Age of Onset    Alzheimer's Disease Mother     Arthritis Mother     Diabetes Father     Heart Disease Father     Heart Disease Paternal Grandfather        REVIEW OF SYSTEMS:     Constitutional: weakness  HEENT:  No headache, otalgia, itchy eyes, nasal discharge or sore throat. Cardiac:  No chest pain, dyspnea, orthopnea or PND. Chest:   No cough, phlegm or wheezing. Abdomen:  Coffee ground emesis x 3 days prior to admission  Neuro:  No focal weakness, abnormal movements or seizure like activity. Skin:   No rashes, no itching. :   No hematuria, no pyuria, no dysuria, no flank pain. Extremities:  No swelling or joint pains. ROS was otherwise negative except as mentioned in the [address] PHYSICAL EXAM:    BP (!) 111/51   Pulse 116   Temp 98.7 °F (37.1 °C) (Oral)   Resp 21   Ht 5' 10\" (1.778 m)   Wt 198 lb 6.6 oz (90 kg)   SpO2 93%   BMI 28.47 kg/m²   . TMAX[24]    General: pale   Head:  Normocephalic, Atraumatic  EENT: EOMI, Sclera not icteric, Oropharynx moist  Neck:  Supple, Trachea midline  Lungs:CTA Bilaterally  Heart: RRR, No murmur, No rub, No gallop, PMI nondisplaced. Abdomen:Soft, Non tender, Not distended, BS WNL,  No masses. No hepatomegalia   Ext:No clubbing. No cyanosis. No edema. Skin: No rashes. No jaundice. No stigmata of liver disease.     Neuro:  A&O x Two, No focal neurological deficits    LABS and IMAGING:     Hemotological labs:   ANEMIA STUDIES  No results for input(s): LABIRON, TIBC, FERRITIN, [id number], FOLATE, OCCULTBLD in the last 72 hours. CBC  Recent Labs     05/22/19 2256 05/23/19  0549   WBC 27.9* 21.7*   HGB 7.1* 6.7*  6.7*   MCV 91.1 91.8   RDW 13.8 13.6   * 376       PT/INR  Recent Labs     05/22/19 2256   PROTIME 11.1   INR 1.1       BMP  Recent Labs     05/22/19 2256 05/23/19  0240 05/23/19  0549   * 128* 129*   K 6.6* 6.2* 5.2   CL 79* 87* 88*   CO2 6* 7* 10*   * 99* 106*   CREATININE 4.55* 3.64* 3.41*   GLUCOSE 830* 611* 538*   CALCIUM 9.3 8.1* 8.6       LIVER WORK UP  Hepatitis Functional Panel:  Recent Labs     05/22/19 2256   ALKPHOS 120   ALT 6   AST 6   PROT 8.1   BILITOT 0.20*   LABALBU 3.4*       AMYLASE/LIPASE/AMMONIA  Recent Labs     05/22/19  2256   AMMONIA 29       Acute Hepatitis Panel   Lab Results   Component Value Date    HEPBSAG NONREACTIVE 03/29/2016    HEPCAB NONREACTIVE 03/29/2016    HEPBIGM NONREACTIVE 03/29/2016    HEPAIGM NONREACTIVE 03/29/2016       Cancer Markers:  CEA:    No results for input(s): CEA in the last 72 hours. Ca 125:   No results for input(s):  in the last 72 hours. Ca 19-9:     Invalid input(s):   AFP: No results for input(s): AFP in the last 72 hours. Principal Problem:    Upper GI bleed  Active Problems:    Essential hypertension    DM type 2, uncontrolled, with neuropathy ([facility] [address]  Resolved Problems:    * No resolved hospital problems. *       Assessment:  76 yr old man admitted with coffee ground emesis, anemia, DKA, foot wound and sepsis. 1. Acute blood loss anemia-hematemesis and coffee ground emesis-etiology unclear at this time as pt is not a reliable historian. Pt was hypotensive upon arrival, tachycardiac    2. Sepsis most likely related to uncontrolled DM evidenced with admitting labs, foot wound etc    3. LAMBERTO-could related to sepsis/infected foot, acute blood loss, dehydration, uncontrolled DM, decreased perfusion      Plan of care:  1. NPO now  2.  Maintain NGT to LIWS  3. Will plan for urgent EGD in OR approx 1 pm  4. Contt PPI  5. Cont blood transfusions- 3rd unit PRBC ordered  6. Will order 10 mg IV Reglan  7. Monitor and trend Hgb every 4 hrs now-may decrease draws once pt is stabilized  8. Complete POC to follow endoscopy results    This plan was formulated in collaboration with Dr. Landon Gardiner MD    Electronically signed by:  Kenneth Degroot [facility], [facility]  [phone]  5/23/2019    7:57 AM Attending Physician Statement  I have discussed the care of Milena Dickinson and   I have examined the patient myselft and taken ros and hpi , including pertinent history and exam findings,  with the author of this note . I have reviewed the key elements of all parts of the encounter with the nurse practitioner/resident.     I agree with the assessment, plan and orders as documented by the above health care provider       Electronically signed by Yanely Nguyen MD

## 2019-05-23 NOTE — CONSULTS
than apply as a wet to dry dressing. 4/26/19   Manuela Cardenas DPM   Gauze Pads & Dressings (GAUZE DRESSING) 4\"X4\" PADS Apply to wound. 4/26/19   Manuela Cardenas DPM   Gauze Bandages (ROLLED GAUZE BANDAGE 4\"X2. 5YD) MISC Apply to wound. 4/26/19   Lala Spurling Ferradino, DPM   collagenase (SANTYL) 250 UNIT/GM ointment Apply topically daily. 4/15/19   Manuela Cardenas DPM   Gauze Pads & Dressings (GAUZE DRESSING) 4\"X4\" PADS Apply to wound. 4/15/19   Manuela Cardenas DPM   Gauze Bandages (ROLLED GAUZE BANDAGE 4\"X2. 5YD) MISC Apply to wound. 4/15/19   Manuela Cardenas DPM   Soft Lens Products (SALINE) 0.9 % SOLN Apply 1 Bottle topically 2 times daily Apply a wet to dry twice a day. Can also use to cleanse area prior to dressing 4/15/19   Manuela Cardenas DPM   blood glucose monitor strips Check blood sugars daily as directed. 3/27/19 3/26/20  Trudy Borden MD   atorvastatin (LIPITOR) 40 MG tablet Take 1 tablet by mouth daily 3/27/19   Trudy Borden MD   gabapentin (NEURONTIN) 300 MG capsule Take 1 capsule by mouth 3 times daily for 180 days. Intended supply: 30 days 3/27/19 9/23/19  Trudy Borden MD   Wound Dressings (FIBRACOL) MISC Apply to wound.  3/11/19   Manuela Cardenas DPM   Continuous Blood Gluc  (FREESTYLE SOFI 14 DAY READER) DONG Use daily 3/7/19   Trudy Borden MD   Continuous Blood Gluc Sensor (FREESTYLE SOFI 14 DAY SENSOR) MISC Use daily 3/7/19   Trudy Borden MD   lisinopril (PRINIVIL;ZESTRIL) 5 MG tablet Take 1 tablet by mouth daily 3/6/19   Trudy Borden MD   diclofenac (VOLTAREN) 75 MG EC tablet Take 1 tablet by mouth 2 times daily (with meals) 3/6/19   Trudy Borden MD   insulin glargine (TOUJEO SOLOSTAR) 300 UNIT/ML injection pen Inject 50 Units into the skin nightly 2/27/19   Trudy Borden MD   metFORMIN (GLUCOPHAGE) 1000 MG tablet Take 1 tablet by mouth 2 times daily (with meals) 2/27/19   Trudy Borden MD   citalopram (CELEXA) 20 MG tablet Take 30 mg by mouth daily     Historical Provider, MD   QUEtiapine (SEROQUEL) 300 MG tablet Take 300 mg by mouth daily     Historical Provider, MD    Scheduled Meds:   vancomycin (VANCOCIN) intermittent dosing (placeholder)   Other RX Placeholder    vancomycin  1,250 mg Intravenous Once    sodium chloride  1,000 mL Intravenous Once    lactated ringers bolus  1,000 mL Intravenous Once    calcium gluconate IVPB  3 g Intravenous Once     Continuous Infusions:   IV infusion builder      dextrose      insulin (HUMAN R) non-weight based infusion       PRN Meds:.glucose, dextrose, glucagon (rDNA), dextrose    Allergies  is allergic to keflex [cephalexin]. Family History  family history includes Alzheimer's Disease in his mother; Arthritis in his mother; Diabetes in his father; Heart Disease in his father and paternal grandfather. Social History   reports that he quit smoking about 23 years ago. His smoking use included cigarettes. He has a 50.00 pack-year smoking history. He has never used smokeless tobacco.   reports that he does not drink alcohol. reports that he does not use drugs.     Objective     Vitals:  Patient Vitals for the past 8 hrs:   BP Temp Pulse Resp SpO2 Height Weight   19 0100 (!) 100/57 -- 104 20 100 % -- --   19 0030 130/66 -- 102 24 99 % -- --   19 0000 (!) 116/49 -- 100 11 (!) 77 % -- --   19 2306 -- -- 104 -- -- -- --   19 2226 121/66 99.1 °F (37.3 °C) 103 14 100 % 5' 10\" (1.778 m) 200 lb (90.7 kg)     Average, Min, and Max for last 24 hours Vitals:  TEMPERATURE:  Temp  Av.1 °F (37.3 °C)  Min: 99.1 °F (37.3 °C)  Max: 99.1 °F (37.3 °C)    RESPIRATIONS RANGE: Resp  Av.3  Min: 11  Max: 24    PULSE RANGE: Pulse  Av.6  Min: 100  Max: 104    BLOOD PRESSURE RANGE:  Systolic (86UVB), BETO:155 , Min:100 , JKM:410   ; Diastolic (04XZX), FMU:64, Min:49, Max:66      PULSE OXIMETRY RANGE: SpO2  Av %  Min: 77 %  Max: 100 %  I&O:  No otherwise noted below. Documentation of the HPI, Physical Exam and Medical Decision Making performed by medical students or scribes is based on my personal performance of the HPI, PE and MDM. I have personally evaluated this patient and have completed at least one if not all key elements of the E/M (history, physical exam, and MDM). Additional findings are as noted.      Valeriy Breaux DPM on 5/24/2019 at 2:48 AM  Board Certified, American Board of Podiatric Surgery  Fellow, [employer] [facility] and [facility]

## 2019-05-23 NOTE — ED PROVIDER NOTES
CHEST PORTABLE   Final Result   1. Marked low lung volumes with bibasilar atelectasis.                LABS:  Labs Reviewed   CBC WITH AUTO DIFFERENTIAL - Abnormal; Notable for the following components:       Result Value    WBC 27.9 (*)     RBC 2.58 (*)     Hemoglobin 7.1 (*)     Hematocrit 23.5 (*)     Platelets 506 (*)     Seg Neutrophils 95 (*)     Lymphocytes 4 (*)     Eosinophils % 0 (*)     Segs Absolute 26.50 (*)     All other components within normal limits   COMPREHENSIVE METABOLIC PANEL - Abnormal; Notable for the following components:    Glucose 830 (*)      (*)     CREATININE 4.55 (*)     Sodium 129 (*)     Potassium 6.6 (*)     Chloride 79 (*)     CO2 6 (*)     Anion Gap 44 (*)     Total Bilirubin 0.20 (*)     Alb 3.4 (*)     Albumin/Globulin Ratio 0.7 (*)     GFR Non- 13 (*)     GFR  16 (*)     All other components within normal limits   LACTATE, SEPSIS - Abnormal; Notable for the following components:    Lactic Acid, Sepsis, Whole Blood 3.7 (*)     All other components within normal limits   MAGNESIUM - Abnormal; Notable for the following components:    Magnesium 3.1 (*)     All other components within normal limits   TROPONIN - Abnormal; Notable for the following components:    Troponin, High Sensitivity 34 (*)     All other components within normal limits   BETA-HYDROXYBUTYRATE - Abnormal; Notable for the following components:    Beta-Hydroxybutyrate 13.66 (*)     All other components within normal limits   CULTURE BLOOD #1   CULTURE BLOOD #1   URINE CULTURE   AMMONIA   PROTIME-INR   APTT   URINALYSIS WITH MICROSCOPIC   POCT GLUCOSE   POCT GLUCOSE   POCT GLUCOSE   POCT GLUCOSE   POCT GLUCOSE   POCT GLUCOSE   TYPE AND SCREEN   PREPARE RBC (CROSSMATCH)         EMERGENCY DEPARTMENT COURSE:     -------------------------  BP: (!) 116/49, Temp: 99.1 °F (37.3 °C), Pulse: 100, Resp: 11      Comments    44-year-old gentleman with a history of type 2 diabetes and foot

## 2019-05-23 NOTE — ANESTHESIA PRE PROCEDURE
Intravenous Q8H Rosa Grimm MD   Stopped at 05/23/19 1109    [MAR Hold] gabapentin (NEURONTIN) capsule 100 mg  100 mg Oral TID Rosa Grimm MD        [facility] Hold] 0.9 % sodium chloride bolus  250 mL Intravenous Once Rosa Grimm MD        0.9 % sodium chloride bolus  250 mL Intravenous Once Amparo Story  mL/hr at 05/23/19 1013 250 mL at 05/23/19 1013    [MAR Hold] pantoprazole (PROTONIX) 80 mg in sodium chloride 0.9 % 100 mL infusion  8 mg/hr Intravenous Continuous Rosa Grimm MD 10 mL/hr at 05/23/19 1200 8 mg/hr at 05/23/19 1200    0.9 % sodium chloride bolus  250 mL Intravenous Once Rosa Grimm MD 20 mL/hr at 05/23/19 1013 250 mL at 05/23/19 1013    [MAR Hold] sodium bicarbonate 150 mEq in sterile water infusion   Intravenous Continuous Amira Wick  mL/hr at 05/23/19 1222      [MAR Hold] insulin regular (HUMULIN R;NOVOLIN R) 100 Units in sodium chloride 0.9 % 100 mL infusion  9 Units/hr Intravenous Continuous Shavon Sanchez MD 3.5 mL/hr at 05/23/19 1415 3.45 Units/hr at 05/23/19 1415    [MAR Hold] glucose (GLUTOSE) 40 % oral gel 15 g  15 g Oral PRN Divine Rivero MD        [facility] Hold] dextrose 50 % solution 12.5 g  12.5 g Intravenous PRN Divine Rivero MD        [facility] Hold] glucagon (rDNA) injection 1 mg  1 mg Intramuscular PRN Divine Rivero MD        [facility] Hold] dextrose 5 % solution  100 mL/hr Intravenous PRN Jones Wayne MD         Facility-Administered Medications Ordered in Other Encounters   Medication Dose Route Frequency Provider Last Rate Last Dose    lactated ringers infusion    Continuous PRN Ivanna Lazcano APRN - CRNA        propofol injection    PRN Ivanna Lazcano APRN - CRNA   200 mg at 05/23/19 1446    rocuronium (ZEMURON) injection    PRN Ivanna Lazcano APRN - CRNA   40 mg at 05/23/19 1446    ondansetron (ZOFRAN) injection    PRN ZACHARIAH Black - CRNA   4 mg at 05/23/19 1453    lidocaine PF 1 % injection    PRN Erin Pyle Kirby Young, APRN - CRNA   50 mg at 19 1446    phenylephrine (BIJU-SYNEPHRINE) injection    PRN Lacy Shed, APRN - CRNA   200 mcg at 19 1458    sugammadex Vara Peoples) injection    PRN Lacy Shed, APRN - CRNA   360 mg at 19 1458       Allergies:     Allergies   Allergen Reactions    Keflex [Cephalexin] Hives       Problem List:    Patient Active Problem List   Diagnosis Code    Pure hypercholesterolemia E78.00    Hypertriglyceridemia E78.1    Neuropathy G62.9    Essential hypertension I10    Type 2 diabetes mellitus with insulin therapy ([facility] [address] E11.9, Z79.4    Skin ulcer ([facility] [address] L98.499    Hammer toe of right foot M20.41    DM type 2, uncontrolled, with neuropathy ([facility] [address] E11.40, E11.65    Upper GI bleed K92.2       Past Medical History:        Diagnosis Date    Depression     Diabetes ([facility] [address]     DM (diabetes mellitus screen)     Hyperlipemia     Insomnia     Osteoarthritis        Past Surgical History:        Procedure Laterality Date    HERNIA REPAIR      TONSILLECTOMY         Social History:    Social History     Tobacco Use    Smoking status: Former Smoker     Packs/day: 2.00     Years: 25.00     Pack years: 50.00     Types: Cigarettes     Last attempt to quit: 1995     Years since quittin.8    Smokeless tobacco: Never Used   Substance Use Topics    Alcohol use: No     Alcohol/week: 0.0 oz     Comment: occassionally                                Counseling given: Not Answered      Vital Signs (Current):   Vitals:    19 1200 19 1245 19 1300 19 1400   BP: (!) 150/66  (!) 103/57 (!) 99/57   Pulse: 117  121 113   Resp: 17  15 16   Temp: 98.2 °F (36.8 °C) 98.6 °F (37 °C)     TempSrc: Oral Oral     SpO2: 96%  97% 98%   Weight:       Height:                                                  BP Readings from Last 3 Encounters:   19 (!) 99/57   19 (!) 90/56   19 (!) 126/54       NPO Status:NG in place BMI:   Wt Readings from Last 3 Encounters:   05/23/19 198 lb 6.6 oz (90 kg)   04/22/19 200 lb (90.7 kg)   04/15/19 211 lb (95.7 kg)     Body mass index is 28.47 kg/m². CBC:   Lab Results   Component Value Date    WBC 21.7 05/23/2019    RBC 2.44 05/23/2019    HGB 8.7 05/23/2019    HCT 26.4 05/23/2019    MCV 91.8 05/23/2019    RDW 13.6 05/23/2019     05/23/2019       CMP:   Lab Results   Component Value Date     05/23/2019    K 4.2 05/23/2019    CL 99 05/23/2019    CO2 20 05/23/2019    BUN 95 05/23/2019    CREATININE 2.29 05/23/2019    GFRAA 35 05/23/2019    LABGLOM 29 05/23/2019    GLUCOSE 298 05/23/2019    PROT 5.7 05/23/2019    CALCIUM 8.3 05/23/2019    BILITOT 0.20 05/22/2019    ALKPHOS 120 05/22/2019    AST 6 05/22/2019    ALT 6 05/22/2019       POC Tests:   Recent Labs     05/23/19  1409   POCGLU 175*       Coags:   Lab Results   Component Value Date    PROTIME 11.1 05/22/2019    INR 1.1 05/22/2019    APTT 24.7 05/22/2019       HCG (If Applicable): No results found for: PREGTESTUR, PREGSERUM, HCG, HCGQUANT     ABGs: No results found for: PHART, PO2ART, CEK3GHB, YZI0AES, BEART, R8VBAHNJ     Type & Screen (If Applicable):  No results found for: LABABO, LABRH    Anesthesia Evaluation   no history of anesthetic complications:   Airway: Mallampati: I     Neck ROM: full  Mouth opening: > = 3 FB Dental:          Pulmonary:       (-) stridor                           Cardiovascular:    (+) hypertension:, hyperlipidemia          Rate: normal                    Neuro/Psych:               GI/Hepatic/Renal:            ROS comment: GI bleed. Endo/Other:    (+) DiabetesType II DM, poorly controlled, using insulin, . Abdominal:   (+) obese,     Abdomen: soft. Vascular:                                        Anesthesia Plan      general     ASA 4       Induction: intravenous and rapid sequence.       Anesthetic plan and risks discussed with patient.                       Karen Sanchez MD   5/23/2019

## 2019-05-23 NOTE — PROGRESS NOTES
Insert Arterial Line  Date/Time:  05/23/19, 10:31 AM  Performed by: Vicente Epley    Patient identity confirmed: arm band and provided demographic data   Time out: Immediately prior to procedure a \"time out\" was called to verify the correct patient, procedure, equipment, support staff. Preparation: Patient was prepped and draped in the usual sterile fashion.     Location:right radial    Ramana's test normal: yes  Needle gauge: 20     Number of attempts: 1  Post-procedure: transparent dressing applied and line secured    Patient tolerance: well

## 2019-05-23 NOTE — FLOWSHEET NOTE
Attempt to obtain PIV x 2 with ultrasound guidance without success. Good blood return noted, catheter threads with ease, but swelling noted after 10 ml NS flush. Pt stating \"that's enough. I have enough IVs already. I want to sleep. \" Primary nurse at bedside. Aware of pt's refusal. Order to be dc'd at this time. Pt has Arterial line, and 3 other PIV infusing at this time.

## 2019-05-24 NOTE — PROGRESS NOTES
Progress Note  Podiatric Medicine and Surgery     Subjective     CC: Left foot pain and ulcer    Patient seen and examined at bedside. Restraints in place  Afebrile, tachycardic  denied nausea and vomiting   Pain controlled  S/p EGD (DOS: 5/23/19)    HPI :  Duke Mukherjee is a 76 y.o. male seen at [facility] ED for evaluation of left foot ulcer. The patient has been worked up and labs and vitals indicated that he is Septic, in DKA, and may have possible GI bleed. He reports weakness for many weeks which is why he was unable to follow up for his heel. He states that he was unable to get up and therefore his daughter requested that he go to the ER. He states he was performing dressing changes as instructed. He was seeing Dr. Thomas King however states he would like to see a new podiatrist at this time. Plan is for admission to ICU. Patient crossed for 4 units. ROS: Denies N/V/F/C/SOB/CP. Otherwise negative except at stated in the HPI.      Medications:  Scheduled Meds:   potassium phosphate IVPB  20 mmol Intravenous Once    lidocaine   Topical Once    citalopram  30 mg Oral Daily    QUEtiapine  300 mg Oral Daily    atorvastatin  40 mg Oral Daily    sodium chloride flush  10 mL Intravenous 2 times per day    piperacillin-tazobactam  3.375 g Intravenous Q8H    gabapentin  100 mg Oral TID    sodium chloride  250 mL Intravenous Once    fluconazole  100 mg Intravenous Q24H    sucralfate  1 g Oral 4 times per day       Continuous Infusions:   pantoprozole (PROTONIX) infusion 8 mg/hr (05/23/19 2154)    IV infusion builder 125 mL/hr at 05/23/19 2153    insulin (HUMAN R) non-weight based infusion 3.36 Units/hr (05/24/19 0619)    dextrose         PRN Meds:potassium chloride, sodium chloride flush, magnesium hydroxide, ondansetron, senna, acetaminophen, glucose, dextrose, glucagon (rDNA), dextrose    Objective     Vitals:  Patient Vitals for the past 8 hrs:   BP Temp Temp src Pulse Resp SpO2 Weight 19 0600 (!) 135/58 -- -- 115 18 -- --   19 0500 (!) 118/52 -- -- 115 16 -- --   19 0412 -- -- -- -- -- -- 197 lb 12 oz (89.7 kg)   19 0400 114/62 99.5 °F (37.5 °C) Axillary 115 16 97 % --   19 0300 (!) 102/52 -- -- 112 18 98 % --   19 0201 (!) 116/53 -- -- 113 16 99 % --   19 0100 (!) 114/56 -- -- 107 19 99 % --   19 0000 (!) 100/39 99.9 °F (37.7 °C) Axillary 108 20 98 % --   19 2300 (!) 134/55 -- -- 117 21 99 % --     Average, Min, and Max for last 24 hours Vitals:  TEMPERATURE:  Temp  Av.4 °F (36.9 °C)  Min: 96.4 °F (35.8 °C)  Max: 99.9 °F (37.7 °C)    RESPIRATIONS RANGE: Resp  Av.1  Min: 0  Max: 34    PULSE RANGE: Pulse  Av.8  Min: 107  Max: 121    BLOOD PRESSURE RANGE:  Systolic (72BPY), HKO:138 , Min:71 , BAV:298   ; Diastolic (16LRT), FREDA:64, Min:36, Max:76      PULSE OXIMETRY RANGE: SpO2  Av.4 %  Min: 37 %  Max: 100 %    I/O last 3 completed shifts: In: 5275.2 [I.V.:4031; Blood:1094.2; IV Piggyback:150]  Out: 4932 [Urine:2800; Emesis/NG SFCEUX:3035]    CBC:  Recent Labs     196 19  0549 19  0953  19  0047 19  0351   WBC 27.9* 21.7*  --   --   --   --  9.0   HGB 7.1* 6.7*  6.7*  --    < > 8.3* 7.8* 7.7*   HCT 23.5* 22.4*  22.1*  --    < > 24.1* 22.7* 22.9*   * 376  --   --   --   --  226   .0*  --  150.6*  --   --   --   --     < > = values in this interval not displayed.         BMP:  Recent Labs     19  00419  0351    145* 145*   K 3.6* 3.3* 3.2*    101 100   CO2 23 27 30   BUN 80* 68* 62*   CREATININE 2.17* 1.88* 1.76*   GLUCOSE 212* 199* 202*   CALCIUM 8.6 8.3* 8.4*        Coags:  Recent Labs     19  1227   APTT 24.7  --    PROT 8.1 5.7*   INR 1.1  --        Lab Results   Component Value Date    SEDRATE >130 (H) 2019     Recent Labs     19  0953   .0* 150.6*       Lower Extremity Physical Exam:  Vascular: DP and PT pulses are non palpable. CFT <5 seconds to all digits. Hair growth is absent to the level of the digits. Mild edema to left heel.       Neuro: Saph/sural/SP/DP/plantar sensation absent to light touch.     Musculoskeletal: Muscle strength is 4/5 to all lower extremity muscle groups. Gross deformity is present with cavus foot structure and hammer digits 2-5. +POP to the left medial heel.      Dermatologic: Full thickness ulcer #1 located medial left heel and measures approximately 6.0cm x 6.0cm x 0.5cm. boggy eschar superficially. Base is 90% necrotic 10% fibrotic. Periwound skin is macerated. no drainage noted with no associated mal odor. Mild erythema present to periwound with associated increase in warmth. Does not probe to bone, sinus track, or undermine. No fluctuance, crepitus, or induration. Interdigital maceration absent.      HPK to distal tuft of R 3rd digit. Imaging:   US RENAL COMPLETE   Final Result   *Right renal cyst of otherwise negative renal ultrasound. *Limited assessment of the bladder with Mcintosh catheter in place without   evidence of gross abnormality. XR TIBIA FIBULA LEFT (2 VIEWS)   Final Result   1. Unremarkable radiographs of the left tibia and fibula. XR FOOT LEFT (MIN 3 VIEWS)   Final Result   1. Soft tissue ulceration along the plantar aspect of the heel. No evidence   of osteomyelitis. Underlying infection cannot be excluded. 2. No fracture. XR CHEST PORTABLE   Final Result   1. Marked low lung volumes with bibasilar atelectasis. Assessment   Mauro Moreira is a 76 y.o. male with   1. Sepsis  2. wet gangrene of left medial heel   3. S/p EGD (DOS 5/23/19)  4. Diabetic ketoacidosis  5. Acute renal failure  6. Hyperkeratosis of R 3rd digit  7.  Uncontrolled diabetes mellitus with peripheral neuropathy     Principal Problem:    Upper GI bleed  Active Problems:    Essential hypertension    DM type 2, uncontrolled, with neuropathy ([facility] [address]    Hyperkalemia  Resolved Problems:    * No resolved hospital problems. *       Plan     · Patient examined and evaluated at bedside   · Treatment options discussed in detail with the patient  · ICU - medical management   · Gastroenterology following  · Neph following  · On IV zosyn - Recommend ID consult for abx recommendations  · PVR reviewed - JANNETTE 1.2 R 0.96 L; moderate occlusive disease L w/ excercise  · Cx - many GPC and GNR  · XR of Right foot and Tib/Fib reviewed - no evidence of soft tissue emphysema, areas of soft tissue defects corresponding with wound noted to posterior heel. · WBC improving. Will monitor on local wound care and IV abx for improvement. Possible debridement if unimproved or worsening. · Dressing changed to left lower extremity: betadine wtd dsd, light ace  · Will discuss with Dr. Dash, [place]   Podiatric Medicine & Surgery   5/24/2019 at 6:48 AM    I performed a history and physical examination of the patient and discussed management with the resident. I reviewed the residents note and agree with the documented findings and plan of care. Any areas of disagreement are noted on the chart. I was personally present for the key portions of any procedures. I have documented in the chart those procedures where I was not present during the key portions. I have reviewed the Podiatry Resident progress note. I agree with the chief complaint, past medical history, past surgical history, allergies, medications, social and family history as documented unless otherwise noted below. Documentation of the HPI, Physical Exam and Medical Decision Making performed by medical students or scribes is based on my personal performance of the HPI, PE and MDM. I have personally evaluated this patient and have completed at least one if not all key elements of the E/M (history, physical exam, and MDM).  Additional findings are as noted.     Val Goncalves DPM on 5/24/2019 at 71:91 PM  Board Certified, American Board of Podiatric Surgery  Fellow, [employer] [facility] [employer] [facility]

## 2019-05-24 NOTE — PLAN OF CARE
Problem: Risk for Impaired Skin Integrity  Goal: Tissue integrity - skin and mucous membranes  Description  Structural intactness and normal physiological function of skin and  mucous membranes.   5/23/2019 2239 by Rey Garcia RN  Outcome: Ongoing  5/23/2019 1249 by Gustabo Agee RN  Outcome: Ongoing     Problem: Falls - Risk of:  Goal: Will remain free from falls  Description  Will remain free from falls  5/23/2019 2239 by Rey Garcia RN  Outcome: Ongoing  5/23/2019 1249 by Gustabo Agee RN  Outcome: Ongoing     Problem: Falls - Risk of:  Goal: Absence of physical injury  Description  Absence of physical injury  5/23/2019 2239 by Rey Garcia RN  Outcome: Ongoing  5/23/2019 1249 by Gustabo Agee RN  Outcome: Ongoing     Problem: Restraint Use - Nonviolent/Non-Self-Destructive Behavior:  Goal: Absence of restraint indications  Description  Absence of restraint indications  Outcome: Ongoing     Problem: Restraint Use - Nonviolent/Non-Self-Destructive Behavior:  Goal: Absence of restraint-related injury  Description  Absence of restraint-related injury  Outcome: Ongoing

## 2019-05-24 NOTE — PROGRESS NOTES
Renal Progress Note    Patient :  Alvaro Mcmillan; 76 y.o. MRN# [id number]  Location:  8295/3001-79  Attending:  Phoenix Gibosn MD  Admit Date:  5/22/2019   Hospital Day: 1      Subjective:     IV fluids continue, urine output good, blood sugars better controlled. Acidosis and acidemia resolved. Local wound care to right foot wound continues. Results of EGD reviewed. High suspicion for candidiasis hepatitis and reflux esophagitis based on severe inflammatory changes in the entire esophagus. Currently on Diflucan and also on Carafate. Hemodynamic stable. Requesting oral intake especially fluids. One blood culture positive for gram-positive cocci likely contaminant. One growing MRSA. Creatinine down to 1.8, baseline 1-1.1. Ultrasound scan of kidney reviewed, kidney size 12 cm without any overt structural changes. Mild proteinuria about 300-400 mg. Rest of the workup negative so far. Outpatient Medications:     Medications Prior to Admission: Soft Lens Products (STERILE SALINE) SOLN, Apply to wound. Cleanse wound than apply as a wet to dry dressing. Gauze Pads & Dressings (GAUZE DRESSING) 4\"X4\" PADS, Apply to wound. Gauze Bandages (ROLLED GAUZE BANDAGE 4\"X2. 5YD) MISC, Apply to wound. collagenase (SANTYL) 250 UNIT/GM ointment, Apply topically daily. Gauze Pads & Dressings (GAUZE DRESSING) 4\"X4\" PADS, Apply to wound. Gauze Bandages (ROLLED GAUZE BANDAGE 4\"X2. 5YD) MISC, Apply to wound. Soft Lens Products (SALINE) 0.9 % SOLN, Apply 1 Bottle topically 2 times daily Apply a wet to dry twice a day. Can also use to cleanse area prior to dressing  blood glucose monitor strips, Check blood sugars daily as directed. atorvastatin (LIPITOR) 40 MG tablet, Take 1 tablet by mouth daily  gabapentin (NEURONTIN) 300 MG capsule, Take 1 capsule by mouth 3 times daily for 180 days. Intended supply: 30 days  Wound Dressings (FIBRACOL) MISC, Apply to wound.   Continuous Blood Gluc  (FREESTYLE SOFI 14 DAY 117/53  BP Range: Systolic (48YGQ), OLY:817 , Min:71 , AX       Diastolic (81VFE), HWJ:23, Min:39, Max:76      Physical Examination:     General:  AAO x 3, speaking in full sentences, no accessory muscle use. Oral mucosa dry, flaky   HEENT: Atraumatic, normocephalic, no throat congestion, moist mucosa. Eyes:   Pupils equal, round and reactive to light, EOMI. Neck:   No JVD, no thyromegaly, no lymphadenopathy. Chest:  Bilateral vesicular breath sounds, no rales or wheezes. Cardiac:  S1 S2 RR, no murmurs, gallops or rubs, JVP not raised. Abdomen: Soft, non-tender, no masses or organomegaly, BS audible. :   No suprapubic or flank tenderness. Neuro:  AAO x 3, No FND. SKIN:  No rashes, good skin turgor. Extremities:  No edema, palpable peripheral pulses, no calf tenderness. Labs:       Recent Labs     1949  19  035   WBC 27.9* 21.7*  --   --   --  9.0   RBC 2.58* 2.44*  --   --   --  2.71*   HGB 7.1* 6.7*  6.7*   < > 8.3* 7.8* 7.7*   HCT 23.5* 22.4*  22.1*   < > 24.1* 22.7* 22.9*   MCV 91.1 91.8  --   --   --  84.5   MCH 27.5 27.5  --   --   --  28.4   MCHC 30.2 29.9  --   --   --  33.6   RDW 13.8 13.6  --   --   --  13.6   * 376  --   --   --  226   MPV 10.7 10.9  --   --   --  10.1    < > = values in this interval not displayed.       BMP:   Recent Labs     19  035    145* 145*   K 3.6* 3.3* 3.2*    101 100   CO2 23 27 30   BUN 80* 68* 62*   CREATININE 2.17* 1.88* 1.76*   GLUCOSE 212* 199* 202*   CALCIUM 8.6 8.3* 8.4*      Phosphorus:     Recent Labs     19  0549 19  0351   PHOS 5.4* 2.2*     Magnesium:    Recent Labs     19  0240 19  0549 19  0351   MG 2.4 2.5 2.2     Albumin:    Recent Labs     19  2256   LABALBU 3.4*     BNP:      Lab Results   Component Value Date    BNP 43 2013     IKER:    No results found for: IKER  SPEP:  Lab Results Component Value Date    PROT 5.7 05/23/2019    ALBCAL PENDING 05/23/2019    ALBPCT PENDING 05/23/2019    LABALPH PENDING 05/23/2019    LABALPH PENDING 05/23/2019    A1PCT PENDING 05/23/2019    A2PCT PENDING 05/23/2019    LABBETA PENDING 05/23/2019    BETAPCT PENDING 05/23/2019    GAMGLOB PENDING 05/23/2019    GGPCT PENDING 05/23/2019    PATH PENDING 05/23/2019     UPEP:   No results found for: LABPE  C3:     Lab Results   Component Value Date    C3 127 05/23/2019     C4:     Lab Results   Component Value Date    C4 27 05/23/2019     MPO ANCA:   No results found for: MPO  PR3 ANCA:   No results found for: PR3  Anti-GBM:   No results found for: GBMABIGG  Hep BsAg:         Lab Results   Component Value Date    HEPBSAG NONREACTIVE 03/29/2016     Hep C AB:          Lab Results   Component Value Date    HEPCAB NONREACTIVE 03/29/2016       Urinalysis/Chemistries:      Lab Results   Component Value Date    NITRU NEGATIVE 05/23/2019    COLORU YELLOW 05/23/2019    PHUR 5.0 05/23/2019    WBCUA None 05/23/2019    RBCUA 5 TO 10 05/23/2019    MUCUS NOT REPORTED 05/23/2019    TRICHOMONAS NOT REPORTED 05/23/2019    YEAST NOT REPORTED 05/23/2019    BACTERIA NOT REPORTED 05/23/2019    SPECGRAV 1.022 05/23/2019    LEUKOCYTESUR NEGATIVE 05/23/2019    UROBILINOGEN Normal 05/23/2019    BILIRUBINUR NEGATIVE 05/23/2019    GLUCOSEU 3+ 05/23/2019    KETUA SMALL 05/23/2019    AMORPHOUS NOT REPORTED 05/23/2019     Urine Sodium:   No results found for: VASHTI  Urine Potassium:  No results found for: KUR  Urine Chloride:  No results found for: CLUR  Urine Osmolarity: No results found for: OSMOU  Urine Protein:   No components found for: TOTALPROTEIN, URINE   Urine Creatinine:     Lab Results   Component Value Date    LABCREA 43.9 05/23/2019     Urine Eosinophils:  No components found for: UEOS    Radiology:     CXR:     Assessment:     1.  Acute Kidney Injury: Secondary to ischemic ATN from intravascular volume depletion related to osmotic diuresis from diabetic ketoacidosis, hypotension, low flow, sepsis/systemic inflammation response syndrome, baseline creatinine 1-1.1 was upto 4.5, nonoliguric after Mcintosh was placed. Element of obstructive uropathy not ruled out. Appears to be resolving, acute kidney injury stage III, now resolving, creatinine down to 1.7, urine output good  2. Hypernatremia secondary to free water losses from osmotic diuresis resolving   3. Chronic kidney disease stage III Baseline 1-1.1 and non-nephrotic proteinuria does not follow up with nephrology  4. Diabetic ketoacidosis likely triggered by sepsis on insulin infusion, resolved  4. Severe anion gap metabolic acidosis secondary to septic ketoacidosis and element of lactic acidosis, resolved  5. Nonhealing right foot ulcer leading to systemic inflammatory response syndrome with antibiotics  6. Upper GI bleed from suspected candida esophagitis on Diflucan Carafate, nothing by mouth hemoglobin dropped down to 6 range being transfused  7. Type 2 diabetes with poor control hemoglobin A1c that-11 range  8. Essential hypertension  9. Hyperkalemia versus other shift resolved  10. Azotemia from acute kidney injury resolved        Plan:   1. Change IV fluids to half saline with 20 mEq of potassium at a minimum hour   2. Continue with Mcintosh catheter for now   3. Replace potassium as necessary   4. Antibiotics per infectious disease  5. Expect renal function to recover    Nutrition   Please ensure that patient is on a renal diet/TF. Avoid nephrotoxic drugs/contrast exposure. We will continue to follow along with you.

## 2019-05-24 NOTE — PLAN OF CARE
Problem: Risk for Impaired Skin Integrity  Goal: Tissue integrity - skin and mucous membranes  Description  Structural intactness and normal physiological function of skin and  mucous membranes. Outcome: Ongoing     Problem: Falls - Risk of:  Goal: Will remain free from falls  Description  Will remain free from falls  Outcome: Ongoing  Goal: Absence of physical injury  Description  Absence of physical injury  Outcome: Ongoing     Problem: Nutrition  Goal: Optimal nutrition therapy  Outcome: Ongoing   Pt Ng tube clamped for 2hrs, with no evidence of nausea and vomiting. Pt NG tube removed, pt tolerating clear liquid diet. Pt insulin gtt titrated off as ordered, labs monitored q4hrs. Pt voiding clear yellow urine with sediment.

## 2019-05-24 NOTE — PROGRESS NOTES
Critical Care Team - Daily Progress Note      Date and time: 5/24/2019 2:27 PM  Patient's name:  Lili Rivera Record Number: [id number]  Patient's account/billing number: [de-identified]  Patient's YOB: 1950  Age: 76 y.o. Date of Admission: 5/22/2019 10:35 PM  Length of stay during current admission: 1      Primary Care Physician: Haywood Hatchet, MD  ICU Attending Physician: Dr. Nel Chavez    Code Status: Full Code    Reason for ICU admission: sepsis, DKA, GI bleed      SUBJECTIVE:     OVERNIGHT EVENTS:           Patient seen and examined at bedside. No hematemesis or melena overnight. Complaining of throat discomfort and NG tube out. Had upper endoscopy yesterday showing severe inflammation of esophagus, started on Diflucan for possible candida esophagitis, continue PPI, Carafate. Podiatry has been doing local wound care and IV antibiotics for left heel gangrene. LAMBERTO has been improving.   Hemoglobin stable at 7.7    AWAKE & FOLLOWING COMMANDS:  [] No   [x] Yes    CURRENT VENTILATION STATUS:     [] Ventilator  [] BIPAP  [] Nasal Cannula [x] Room Air      IF INTUBATED, ET TUBE MARKING AT LOWER LIP:       cms    SECRETIONS Amount:  [] Small [] Moderate  [] Large  [x] None  Color:     [] White [] Colored  [] Bloody    SEDATION:  RAAS Score:  [] Propofol gtt  [] Versed gtt  [] Ativan gtt   [x] No Sedation    PARALYZED:  [x] No    [] Yes    DIARRHEA:                [x] No                [] Yes  (C. Difficile status: [] positive                                                                                                                       [] negative                                                                                                                     [] pending)    VASOPRESSORS:  [x] No    [] Yes    If yes -   [] Levophed       [] Dopamine     [] Vasopressin       [] Dobutamine  [] Phenylephrine         [] Epinephrine    CENTRAL LINES:     [x] No   [] Yes   (Date of Insertion:   )           If yes -     [] Right IJ     [] Left IJ [] Right Femoral [] Left Femoral                   [] Right Subclavian [] Left Subclavian       QUIÑONES'S CATHETER:   [x] No   [] Yes  (Date of Insertion:   )     URINE OUTPUT:            [x] Good   [] Low              [] Anuric      OBJECTIVE:     VITAL SIGNS:  BP (!) 116/53   Pulse 104   Temp 98.1 °F (36.7 °C) (Oral)   Resp 20   Ht 5' 10\" (1.778 m)   Wt 197 lb 12 oz (89.7 kg)   SpO2 100%   BMI 28.37 kg/m²   Tmax over 24 hours:  Temp (24hrs), Av.4 °F (36.9 °C), Min:96.4 °F (35.8 °C), Max:99.9 °F (37.7 °C)      Patient Vitals for the past 6 hrs:   BP Temp Temp src Pulse Resp SpO2   19 1400 (!) 116/53 -- -- 104 20 100 %   19 1300 (!) 123/49 -- -- 103 19 100 %   19 1200 104/62 98.1 °F (36.7 °C) Oral 108 15 99 %   19 1100 (!) 109/53 -- -- 105 19 100 %   19 1000 130/60 -- -- 112 15 99 %   19 0900 (!) 141/58 -- -- 108 18 100 %         Intake/Output Summary (Last 24 hours) at 2019 1427  Last data filed at 2019 1200  Gross per 24 hour   Intake 3906.85 ml   Output 2550 ml   Net 1356.85 ml     Wt Readings from Last 2 Encounters:   19 197 lb 12 oz (89.7 kg)   19 200 lb (90.7 kg)     Body mass index is 28.37 kg/m².         PHYSICAL EXAMINATION:  Constitutional: Appears non-toxic, agitated at times, no distress currently  EENT: PERRLA, EOMI, sclera clear, anicteric, NG in place  Neck: Supple, symmetrical, trachea midline, no adenopathy  Respiratory: clear to auscultation, no wheezes or rales   Cardiovascular: tachycardic rate and regular rhythm, normal S1, S2, no murmur noted and 2+ pulses throughout  Abdomen: soft, nontender, nondistended  Extremities:  peripheral pulses normal, no pedal edema, left heel ulcer with dry dressing and no strikethrough bleeding    Any additional physical findings:    MEDICATIONS:    Scheduled Meds:   [START ON 2019] QUEtiapine  300 mg Oral Nightly    IVPB builder   Intravenous Q8H    lidocaine   Topical Once    citalopram  30 mg Oral Daily    atorvastatin  40 mg Oral Daily    sodium chloride flush  10 mL Intravenous 2 times per day    gabapentin  100 mg Oral TID    sodium chloride  250 mL Intravenous Once    fluconazole  100 mg Intravenous Q24H    sucralfate  1 g Oral 4 times per day     Continuous Infusions:   IV infusion builder 100 mL/hr at 05/24/19 1224    pantoprozole (PROTONIX) infusion 8 mg/hr (05/24/19 0710)    insulin (HUMAN R) non-weight based infusion 4.24 Units/hr (05/24/19 1404)    dextrose       PRN Meds:     potassium chloride 10 mEq PRN   sodium chloride flush 10 mL PRN   magnesium hydroxide 30 mL Daily PRN   ondansetron 4 mg Q6H PRN   senna 1 tablet Daily PRN   acetaminophen 650 mg Q4H PRN   glucose 15 g PRN   dextrose 12.5 g PRN   glucagon (rDNA) 1 mg PRN   dextrose 100 mL/hr PRN         VENT SETTINGS (Comprehensive) (if applicable): Additional Respiratory  Assessments  Pulse: 104  Resp: 20  SpO2: 100 %  Oral Care: Suction toothette, Mouth moisturizer, Mouth swabbed, Lip moisturizer applied    ABGs:     Laboratory findings:    Complete Blood Count:   Recent Labs     05/22/19 2256 05/23/19  0549  05/24/19  0351 05/24/19  1100 05/24/19  1223   WBC 27.9* 21.7*  --  9.0  --   --    HGB 7.1* 6.7*  6.7*   < > 7.7* 7.7* 7.7*   HCT 23.5* 22.4*  22.1*   < > 22.9* 23.2* 23.1*   * 376  --  226  --   --     < > = values in this interval not displayed.         Last 3 Blood Glucose:   Recent Labs     05/24/19  0351 05/24/19  1100 05/24/19  1223   GLUCOSE 202* 198* 177*        PT/INR:    Lab Results   Component Value Date    PROTIME 11.1 05/22/2019    INR 1.1 05/22/2019     PTT:    Lab Results   Component Value Date    APTT 24.7 05/22/2019       Comprehensive Metabolic Profile:   Recent Labs     05/22/19 2256  05/23/19  1227  05/24/19  0351 05/24/19  1100 05/24/19  1223   *   < > 143   < > 145* 146* 145*   K 6.6*   < > 4.2   < > 3. 2* 3.3* 3.3*   CL 79*   < > 99   < > 100 101 100   CO2 6*   < > 20   < > 30 34* 35*   *   < > 95*   < > 62* 51* 49*   CREATININE 4.55*   < > 2.29*   < > 1.76* 1.57* 1.59*   GLUCOSE 830*   < > 298*   < > 202* 198* 177*   CALCIUM 9.3   < > 8.3*   < > 8.4* 8.3* 8.2*   PROT 8.1  --  5.7*  --   --   --   --    LABALBU 3.4*  --   --   --   --   --   --    BILITOT 0.20*  --   --   --   --   --   --    ALKPHOS 120  --   --   --   --   --   --    AST 6  --   --   --   --   --   --    ALT 6  --   --   --   --   --   --     < > = values in this interval not displayed. Magnesium:   Lab Results   Component Value Date    MG 2.2 05/24/2019     Phosphorus:   Lab Results   Component Value Date    PHOS 2.2 05/24/2019     Ionized Calcium: No results found for: CAION     Urinalysis:     Troponin: No results for input(s): TROPONINI in the last 72 hours.     Microbiology:    Cultures during this admission:     Blood cultures:                 [] None drawn      [] Negative             [x]  Positive (Details: Gram positive rods )  Urine Culture:                   [] None drawn      [] Negative             []  Positive (Details:  )  Sputum Culture:               [] None drawn       [] Negative             []  Positive (Details:  )   Endotracheal aspirate:     [] None drawn       [] Negative             []  Positive (Details:  )     Other pertinent Labs:       Radiology/Imaging:     Chest Xray (5/24/2019):    ASSESSMENT:     Patient Active Problem List    Diagnosis Date Noted    Anemia     Upper GI bleed 05/23/2019    Hyperkalemia     DM type 2, uncontrolled, with neuropathy ([facility] 09/21/2017    Skin ulcer ([facility] 08/23/2017    Hammer toe of right foot 08/23/2017    Neuropathy 07/26/2017    Essential hypertension 07/26/2017    Type 2 diabetes mellitus with insulin therapy ([facility] 07/26/2017    Pure hypercholesterolemia 10/25/2016    Hypertriglyceridemia 10/25/2016     Sepsis   LAMBERTO improving   DKA resolved   AG metabolic have seen and examined the patient and the key elements of all parts of the encounter have been performed by me. I agree with the assessment, plan and orders as documented by the resident with additions . Total critical care time caring for this patient with life threatening, unstable organ failure, including direct patient contact, management of life support systems, review of data including imaging and labs, discussions with other team members and physicians at least 27   Min so far today, excluding procedures. Treatment plan Discussed with nursing staff in detail , all questions answered . Electronically signed by Lisa Carrillo MD on   5/24/19 at 5:34 PM    Please note that this chart was generated using voice recognition Dragon dictation software. Although every effort was made to ensure the accuracy of this automated transcription, some errors in transcription may have occurred.

## 2019-05-24 NOTE — PROGRESS NOTES
Nutrition Assessment    Type and Reason for Visit: Reassess    Nutrition Recommendations:   - Continue Clear Liquid diet (with no red products). Monitor for diet advancement/encourage PO intakes as tolerated. - Will provide apple Ensure Clear Liquid ONS with meals.  - Monitor labs, nausea, and plan of care. Nutrition Assessment: Pt improving nutritionally as evidenced by start of Clear Liquid diet. Pt reports being thirsty. NG clamped this morning with pt tolerating well. Will provide Clear Liquid ONS with meals. Malnutrition Assessment:  · Malnutrition Status: At risk for malnutrition  · Context: (Acute on Chronic)  · Findings of the 6 clinical characteristics of malnutrition (Minimum of 2 out of 6 clinical characteristics is required to make the diagnosis of moderate or severe Protein Calorie Malnutrition based on AND/ASPEN Guidelines):  1. Energy Intake-Less than or equal to 75% of estimated energy requirement, (over past few days)    2. Weight Loss-5% loss or greater, (in 2 months)  3. Fat Loss-No significant subcutaneous fat loss  4. Muscle Loss-Mild muscle mass loss, Temples (temporalis muscle)  5. Fluid Accumulation-Mild fluid accumulation, Generalized    Nutrition Risk Level: Moderate    Nutrient Needs:  · Estimated Daily Total Kcal: 0183-0952 kcal/day  · Estimated Daily Protein (g): 75-90 gm pro/day    Nutrition Diagnosis:   · Problem: Inadequate oral intake  · Etiology: related to Appetite, Current Condition     Signs and symptoms:  as evidenced by Diet history of poor intake, recent start of liquid diet    Objective Information:  · Nutrition-Focused Physical Findings: NG clamped this morning - to be discontinued.   · Wound Type: Diabetic Ulcer(to left heel)  · Current Nutrition Therapies:  · Oral Diet Orders: Clear Liquid(no red products)   · Oral Diet intake: Diet recently started  · Oral Nutrition Supplement (ONS) Orders: None  · Anthropometric Measures:  · Ht: 5' 10\" (177.8 cm) · Current Body Wt: 197 lb 12 oz (89.7 kg)  · Admission Body Wt: 198 lb 6.6 oz (90 kg)  · % Weight Change:  ,  7.9% x 2 months per EHR review  · Ideal Body Wt: 165 lb 12.6 oz (75.2 kg), % Ideal Body 120%  · BMI Classification: BMI 25.0 - 29.9 Overweight    Nutrition Interventions:   Continue current diet, Start ONS - Provide apple Ensure Clear ONS with meals. Continued Inpatient Monitoring, Education Not Indicated    Nutrition Evaluation:   · Evaluation: Progressing toward goals   · Goals: Meet % of estimated nutrition needs once nutrition started.     · Monitoring: Nutrition Progression, Meal Intake, Supplement Intake, Diet Tolerance, Skin Integrity, Wound Healing, I&O, Weight, Pertinent Labs, Monitor Hemodynamic Status, Monitor Bowel Function    Electronically signed by Claudia Curry RD, LD on 5/24/19 at 12:23 PM    Contact Number: [phone]

## 2019-05-24 NOTE — PROGRESS NOTES
[facility]    Gastroenterology Daily Progress Note      Patient:   Jessica Munoz   :    1950   Facility:   [facility]   Date:     2019  Consultant:   Brent Madrigal CNP      Subjective:     76 y.o. male admitted 2019 with Upper GI bleed [K92.2] and seen for coffee ground emesis, anemia. Patient seen and examined. No epigastric pain, nausea or emesis. No melena. Reports discomfort of N/G tube. Documented  50 ml (bloody/brown) out N/G tube overnight       Objective     Scheduled Meds:   potassium phosphate IVPB  20 mmol Intravenous Once    lidocaine   Topical Once    citalopram  30 mg Oral Daily    QUEtiapine  300 mg Oral Daily    atorvastatin  40 mg Oral Daily    sodium chloride flush  10 mL Intravenous 2 times per day    piperacillin-tazobactam  3.375 g Intravenous Q8H    gabapentin  100 mg Oral TID    sodium chloride  250 mL Intravenous Once    fluconazole  100 mg Intravenous Q24H    sucralfate  1 g Oral 4 times per day       Vital Signs:  BP (!) 135/58   Pulse 115   Temp 99.5 °F (37.5 °C) (Axillary)   Resp 18   Ht 5' 10\" (1.778 m)   Wt 197 lb 12 oz (89.7 kg)   SpO2 97%   BMI 28.37 kg/m²      Physical Exam:   General:  AAO x 3, speaking in full sentences, no accessory muscle use. Chest:   Bilateral vesicular breath sounds, no rales or wheezes. Cardiac:  S1 S2 RR, no murmurs or gallops  Abdomen: Soft, non-tender, non distended, BS audible. N/G tube right nares with scant brown content in [place]  SKIN:  No rashes, good skin turgor. No jaundice  Extremities:  No edema, no clubbing, No cyanosis  Neuro:  AAO x 3, No FND.      Lab and Imaging Review     CBC  Recent Labs     19  2256 19  0549  19  0047 19  0351   WBC 27.9* 21.7*  --   --   --  9.0   HGB 7.1* 6.7*  6.7*   < > 8.3* 7.8* 7.7*   HCT 23.5* 22.4*  22.1*   < > 24.1* 22.7* 22.9*   MCV 91.1 91.8  --   --   --  84.5   MCH 27.5 27. 5  --   --   --  28.4   MCHC 30.2 29.9  --   --   --  33.6   * 376  --   --   --  226    < > = values in this interval not displayed. BMP  Recent Labs     05/23/19 2007 05/24/19  0047 05/24/19  0351    145* 145*   K 3.6* 3.3* 3.2*    101 100   CO2 23 27 30   BUN 80* 68* 62*   CREATININE 2.17* 1.88* 1.76*   GLUCOSE 212* 199* 202*   CALCIUM 8.6 8.3* 8.4*       LFTS  Recent Labs     05/22/19 2256 05/23/19  1227   ALKPHOS 120  --    ALT 6  --    AST 6  --    PROT 8.1 5.7*   BILITOT 0.20*  --    LABALBU 3.4*  --        AMYLASE/LIPASE/AMMONIA  Recent Labs     05/22/19 2256   AMMONIA 29       PT/INR  Recent Labs     05/22/19 2256   PROTIME 11.1   INR 1.1       ANEMIA STUDIES  No results for input(s): LABIRON, TIBC, FERRITIN, [id number], FOLATE, OCCULTBLD in the last 72 hours. IMAGING      ENDOSCOPY       EGD 05/23/2019    PREOPERATIVE DIAGNOSIS:      coffee ground emesis . Drop in HGB      Diagnosis:  The entire esophagus is severely inflamed with white to black d/c , not sure if there is related to caustic ingestion or a combination of Candida esophagitis and reflux esophagitis, did not want to biopsy and cause bleeding   the stomach is  Clean  , no blood fresh or old   NGT was removed before the procedure and replaced under diretc visualization   After we were done        Recommendations/Plan:   1. Diflucan  2. PPI   3. Carafate liquid   4. Monitor very closely for complications and perforation      Electronically signed by Zhang Hansen MD  on 5/23/2019 at 2:58 PM          Assessment:     1. 75 y/o male with poorly controlled type  II DM, PVD, nonhealing right foot ulcers, admitted with coffee-ground emesis, anemia, DKA, right foot wound and sepsis. 1.  Acute blood loss anemia - coffee-ground hematemesis  - Resolved  - s/p EGD 05/23/2019: Severe esophagitis (see detailed report)    (has received 3 units PRBCs)   - N/G tube with 50 ml out past 24 hours  - Hgb stable     2.  Severe esophagitis - ? Candida and reflux esophagitis         Plan:     1. Continue PPI gtt and Cararfate  2. Will try a trial wth N/G tube clamped - Clamp N/G tube - If nauseated, return to LIWS  3. Continue Diflucan   4. Trend Hgb  5. Serial abdominal examinations - observe for perforation or complications   6. Please call GI with any questions, concerns or acute change in clinical status       This plan was formulated in collaboration with Dr. Michele Fuller    Electronically signed by Grey Short CNP on 5/24/2019 at 6:36 AM  Attending Physician Statement  I have discussed the care of Dov Sanchez and   I have examined the patient myselft and taken ros and hpi , including pertinent history and exam findings,  with the author of this note . I have reviewed the key elements of all parts of the encounter with the nurse practitioner/resident. I agree with the assessment, plan and orders as documented by the above health care provider   Addition/summary:  Clamp the NG and DC if there is no nausea or vomiting, so that we can start some clear liquid and see how he does  Continue Carafate and high-dose PPI  Repeat endoscopy outpatient in 4-6 weeks following the esophagitis      Electronically signed by Erin Mcfarlane MD     Please note that this note was generated using a voice recognition dictation software. Although every effort was made to ensure the accuracy of this automated transcription, some errors in transcription may have occurred.

## 2019-05-24 NOTE — CONSULTS
at [address] [place] [address] History:  reports that he quit smoking about 23 years ago. His smoking use included cigarettes. He has a 50.00 pack-year smoking history. He has never used smokeless tobacco. He reports that he does not drink alcohol or use drugs. Family History: family history includes Alzheimer's Disease in his mother; Arthritis in his mother; Diabetes in his father; Heart Disease in his father and paternal grandfather.     Allergies: Keflex [cephalexin]    Current Meds:  Current Facility-Administered Medications:     potassium chloride 10 mEq/100 mL IVPB (Peripheral Line), 10 mEq, Intravenous, PRN, Kwame Gee MD    potassium chloride 20 mEq in sodium chloride 0.45 % 1,000 mL infusion, , Intravenous, Continuous, Jero Leung MD, Last Rate: 100 mL/hr at 05/24/19 1224    [START ON 5/25/2019] QUEtiapine (SEROQUEL) tablet 300 mg, 300 mg, Oral, Nightly, Jeff Lopez MD    piperacillin-tazobactam (ZOSYN) 3.375 g in sodium chloride 0.9 % 50 mL IVPB, , Intravenous, Q8H, Jeff Lopez MD    lidocaine (XYLOCAINE) 2% uro-jet, , Topical, Once, Lennie Bond MD, Stopped at 05/23/19 6945    citalopram (CELEXA) tablet 30 mg, 30 mg, Oral, Daily, Annalisa Fischer MD, 30 mg at 05/24/19 0848    atorvastatin (LIPITOR) tablet 40 mg, 40 mg, Oral, Daily, Annalisa Fischer MD, 40 mg at 05/24/19 0848    sodium chloride flush 0.9 % injection 10 mL, 10 mL, Intravenous, 2 times per day, Lennie Bond MD, 10 mL at 05/23/19 2101    sodium chloride flush 0.9 % injection 10 mL, 10 mL, Intravenous, PRN, Annalisa Fischer MD    magnesium hydroxide (MILK OF MAGNESIA) 400 MG/5ML suspension 30 mL, 30 mL, Oral, Daily PRN, Annalisa Fischer MD    ondansetron (ZOFRAN) injection 4 mg, 4 mg, Intravenous, Q6H PRN, Annalisa Fischer MD    senna (SENOKOT) tablet 8.6 mg, 1 tablet, Oral, Daily PRN, Annalisa Fischer MD    acetaminophen (TYLENOL) tablet 650 mg, 650 mg, Oral, Q4H PRN, Annalisa Fischer MD    gabapentin (NEURONTIN) capsule 100 mg, 100 mg, Oral, TID, Annalisa Fischer MD, 100 mg at 05/24/19 0847    0.9 % sodium chloride bolus, 250 mL, Intravenous, Once, Annalisa Fischer MD    pantoprazole (PROTONIX) 80 mg in sodium chloride 0.9 % 100 mL infusion, 8 mg/hr, Intravenous, Continuous, Annalisa Fischer MD, Last Rate: 10 mL/hr at 05/24/19 0710, 8 mg/hr at 05/24/19 0710    insulin regular (HUMULIN R;NOVOLIN R) 100 Units in sodium chloride 0.9 % 100 mL infusion, 9 Units/hr, Intravenous, Continuous, Annalisa Fischer MD, Last Rate: 4.4 mL/hr at 05/24/19 1135, 4.4 Units/hr at 05/24/19 1135    fluconazole (DIFLUCAN) 100 mg IVPB, 100 mg, Intravenous, Q24H, ZACHARIAH Belle CNP, Last Rate: 100 mL/hr at 05/23/19 2059, 100 mg at 05/23/19 2059    sucralfate (CARAFATE) tablet 1 g, 1 g, Oral, 4 times per day, ZACHARIAH Belle CNP, 1 g at 05/24/19 1223    glucose (GLUTOSE) 40 % oral gel 15 g, 15 g, Oral, PRN, Annalisa Fischer MD    dextrose 50 % solution 12.5 g, 12.5 g, Intravenous, PRN, Annalisa Fischer MD    glucagon (rDNA) injection 1 mg, 1 mg, Intramuscular, PRN, Annalisa Fischer MD    dextrose 5 % solution, 100 mL/hr, Intravenous, PRN, Annalisa Fischer MD    REVIEW OF SYSTEMS:      Review of Systems - General ROS: negative for - chills, fatigue, fever or night sweats  Psychological ROS: negative for - anxiety, behavioral disorder or depression  Ophthalmic ROS: negative for - blurry vision, dry eyes or eye pain  ENT ROS: negative for - epistaxis, headaches or nasal congestion  Hematological and Lymphatic ROS: + GI bleeding , denies clotting disorder  Endocrine ROS: negative for - malaise/lethargy, mood swings or palpitations  Respiratory ROS: negative for - cough, hemoptysis or shortness of breath  Cardiovascular ROS: no chest pain or dyspnea on exertion  Gastrointestinal ROS: no abdominal pain, constipation, diarrhea, + GI bleeding   Genito-Urinary ROS: no dysuria, trouble voiding, or hematuria  Musculoskeletal ROS: negative for - joint pain, joint swelling or

## 2019-05-25 NOTE — PLAN OF CARE
Problem: Risk for Impaired Skin Integrity  Goal: Tissue integrity - skin and mucous membranes  Description  Structural intactness and normal physiological function of skin and  mucous membranes.   5/24/2019 2055 by Lyndsey Newton RN  Outcome: Ongoing  5/24/2019 1755 by Neida Wilson RN  Outcome: Ongoing     Problem: Falls - Risk of:  Goal: Will remain free from falls  Description  Will remain free from falls  5/24/2019 2055 by Lyndsey Newton RN  Outcome: Ongoing  5/24/2019 1755 by Neida Wilson RN  Outcome: Ongoing     Problem: Falls - Risk of:  Goal: Absence of physical injury  Description  Absence of physical injury  5/24/2019 2055 by Lyndsey Newton RN  Outcome: Ongoing  5/24/2019 1755 by Neida Wilson RN  Outcome: Ongoing     Problem: Nutrition  Goal: Optimal nutrition therapy  5/24/2019 2055 by Lyndsey Newton RN  Outcome: Ongoing  5/24/2019 1755 by Neida Wilson RN  Outcome: Ongoing

## 2019-05-25 NOTE — PROGRESS NOTES
Progress Note  Podiatric Medicine and Surgery     Subjective     CC: Left foot pain and ulcer    Patient seen and examined at bedside. Restraints in place  Afebrile, tachycardic, hypotensive  Starting CL diet per GI  Pain controlled      HPI :  Jenny Snyder is a 76 y.o. male seen at [facility] ED for evaluation of left foot ulcer. The patient has been worked up and labs and vitals indicated that he is Septic, in DKA, and may have possible GI bleed. He reports weakness for many weeks which is why he was unable to follow up for his heel. He states that he was unable to get up and therefore his daughter requested that he go to the ER. He states he was performing dressing changes as instructed. He was seeing Dr. Richi Haro however states he would like to see a new podiatrist at this time. Plan is for admission to ICU. Patient crossed for 4 units. ROS: Denies N/V/F/C/SOB/CP. Otherwise negative except at stated in the HPI.      Medications:  Scheduled Meds:   calcium gluconate IVPB  1 g Intravenous Once    QUEtiapine  300 mg Oral Nightly    IVPB builder   Intravenous Q8H    insulin glargine  30 Units Subcutaneous BID    insulin lispro  0-12 Units Subcutaneous TID WC    insulin lispro  0-6 Units Subcutaneous Nightly    lidocaine   Topical Once    citalopram  30 mg Oral Daily    atorvastatin  40 mg Oral Daily    sodium chloride flush  10 mL Intravenous 2 times per day    gabapentin  100 mg Oral TID    sodium chloride  250 mL Intravenous Once    fluconazole  100 mg Intravenous Q24H    sucralfate  1 g Oral 4 times per day       Continuous Infusions:   IV infusion builder 100 mL/hr at 05/24/19 2315    pantoprozole (PROTONIX) infusion 8 mg/hr (05/25/19 0503)    dextrose         PRN Meds:potassium chloride, sodium chloride flush, magnesium hydroxide, ondansetron, senna, acetaminophen, glucose, dextrose, glucagon (rDNA), dextrose    Objective     Vitals:  Patient Vitals for the past 8 hrs:   BP Temp Temp src Pulse Resp SpO2 Weight   19 0600 (!) 79/57 -- -- 101 20 100 % --   19 0500 (!) 115/49 -- -- 112 21 98 % --   19 0400 (!) 88/47 99 °F (37.2 °C) Axillary 101 21 99 % 201 lb 15.1 oz (91.6 kg)   19 0300 (!) 95/51 -- -- 102 21 100 % --   19 0200 (!) 110/46 -- -- 100 19 100 % --   19 0100 (!) 92/51 -- -- 100 19 97 % --     Average, Min, and Max for last 24 hours Vitals:  TEMPERATURE:  Temp  Av.5 °F (36.9 °C)  Min: 97.7 °F (36.5 °C)  Max: 99 °F (37.2 °C)    RESPIRATIONS RANGE: Resp  Av.2  Min: 15  Max: 25    PULSE RANGE: Pulse  Av.1  Min: 93  Max: 112    BLOOD PRESSURE RANGE:  Systolic (34ZJL), GSL:311 , Min:79 , HOR:161   ; Diastolic (74NXO), TWK:27, Min:46, Max:88      PULSE OXIMETRY RANGE: SpO2  Av.6 %  Min: 95 %  Max: 100 %    I/O last 3 completed shifts: In: 2686 [I.V.:2628; IV Piggyback:58]  Out: 2900 [UUPW]    CBC:  Recent Labs     19  2256 19  0549 19  0953  19  0351  19  0033 19  0315 19  0441   WBC 27.9* 21.7*  --   --  9.0  --   --   --  8.9   HGB 7.1* 6.7*  6.7*  --    < > 7.7*   < > 7.0* 7.1* 7.1*   HCT 23.5* 22.4*  22.1*  --    < > 22.9*   < > 21.5* 21.4* 21.7*   * 376  --   --  226  --   --   --  181   .0*  --  150.6*  --   --   --   --   --   --     < > = values in this interval not displayed. BMP:  Recent Labs     19  0033 19  0441    136 134*   K 3.9 3.7 4.1   CL 97* 97* 96*   CO2 28 30 27   BUN 39* 35* 31*   CREATININE 1.33* 1.18 1. 25*   GLUCOSE 399* 271* 229*   CALCIUM 7.5* 7.6* 7.7*        Coags:  Recent Labs     19  1227   APTT 24.7  --    PROT 8.1 5.7*   INR 1.1  --        Lab Results   Component Value Date    SEDRATE >130 (H) 2019     Recent Labs     19  0953   .0* 150.6*       Lower Extremity Physical Exam:  Vascular: DP and PT pulses are non palpable.  CFT <5 seconds to all digits. Hair growth is absent to the level of the digits. Mild edema to left heel.       Neuro: Saph/sural/SP/DP/plantar sensation absent to light touch.     Musculoskeletal: Muscle strength is 4/5 to all lower extremity muscle groups. Gross deformity is present with cavus foot structure and hammer digits 2-5. +POP to the left medial heel.      Dermatologic: Full thickness ulcer #1 located medial left heel and measures approximately 6.0cm x 6.0cm x 0.5cm. boggy eschar superficially. Base is 90% necrotic 10% fibrotic. Periwound skin is macerated. no drainage noted with no associated mal odor. Mild erythema present to periwound with associated increase in warmth. Does not probe to bone, sinus track, or undermine. No fluctuance, crepitus, or induration. Interdigital maceration absent.      HPK to distal tuft of R 3rd digit. Imaging:   US RENAL COMPLETE   Final Result   *Right renal cyst of otherwise negative renal ultrasound. *Limited assessment of the bladder with Mcintosh catheter in place without   evidence of gross abnormality. XR TIBIA FIBULA LEFT (2 VIEWS)   Final Result   1. Unremarkable radiographs of the left tibia and fibula. XR FOOT LEFT (MIN 3 VIEWS)   Final Result   1. Soft tissue ulceration along the plantar aspect of the heel. No evidence   of osteomyelitis. Underlying infection cannot be excluded. 2. No fracture. XR CHEST PORTABLE   Final Result   1. Marked low lung volumes with bibasilar atelectasis. Assessment   Mehdi Abdi is a 76 y.o. male with   1. Sepsis  2. wet gangrene of left medial heel   3. S/p EGD (DOS 5/23/19)  4. Diabetic ketoacidosis  5. Acute renal failure  6. Hyperkeratosis of R 3rd digit  7.  Uncontrolled diabetes mellitus with peripheral neuropathy     Principal Problem:    Upper GI bleed  Active Problems:    Essential hypertension    DM type 2, uncontrolled, with neuropathy (HCC)    Hyperkalemia    Anemia  Resolved Problems:    * No resolved hospital problems. *       Plan     · Patient examined and evaluated at bedside   · Treatment options discussed in detail with the patient  · ICU - medical management   · Gastroenterology following - starting CL diet  · Neph following  · On IV zosyn - Recommend ID consult for abx recommendations  · PVR reviewed - JANNETTE 1.2 R 0.96 L; moderate occlusive disease L w/ exercise  · Vasc consulted-  No surgical intervention at this time. · Cx - S. Aureus, e. coli  · XR of Right foot and Tib/Fib reviewed - no evidence of soft tissue emphysema, areas of soft tissue defects corresponding with wound noted to posterior heel. · L foot MRI ordered to assess osseus involvement  · Dressing changed to left lower extremity: betadine wtd dsd, light ace  · Will discuss [place]. [place] [id number]   Podiatric Medicine & Surgery   5/25/2019 at 8:08 AM    I performed a history and physical examination of the patient and discussed management with the resident. I reviewed the residents note and agree with the documented findings and plan of care. Any areas of disagreement are noted on the chart. I was personally present for the key portions of any procedures. I have documented in the chart those procedures where I was not present during the key portions. I have reviewed the Podiatry Resident progress note. I agree with the chief complaint, past medical history, past surgical history, allergies, medications, social and family history as documented unless otherwise noted below. Documentation of the HPI, Physical Exam and Medical Decision Making performed by medical students or scribes is based on my personal performance of the HPI, PE and MDM. I have personally evaluated this patient and have completed at least one if not all key elements of the E/M (history, physical exam, and MDM). Additional findings are as noted.      Anatoly David DPM on 5/28/2019 at 61:88 AM  Board Certified, American Board of Podiatric Surgery  Fellow, [employer]

## 2019-05-25 NOTE — PROGRESS NOTES
[facility]    Gastroenterology Daily Progress Note      Patient:   Sarah Reyes   :    1950   Facility:   [address] [facility]   Date:     2019  Consultant:   Mary Powers CNP      Subjective:     76 y.o. male admitted 2019 with Upper GI bleed [K92.2] and seen for coffee ground emesis, anemia. Patient seen and examined. No signs of GI bleeding. Tolerating CL diet today. Objective     Scheduled Meds:   calcium gluconate IVPB  1 g Intravenous Once    QUEtiapine  300 mg Oral Nightly    IVPB builder   Intravenous Q8H    insulin glargine  30 Units Subcutaneous BID    insulin lispro  0-12 Units Subcutaneous TID WC    insulin lispro  0-6 Units Subcutaneous Nightly    lidocaine   Topical Once    citalopram  30 mg Oral Daily    atorvastatin  40 mg Oral Daily    sodium chloride flush  10 mL Intravenous 2 times per day    gabapentin  100 mg Oral TID    sodium chloride  250 mL Intravenous Once    fluconazole  100 mg Intravenous Q24H    sucralfate  1 g Oral 4 times per day       Vital Signs:  BP (!) 79/57   Pulse 101   Temp 99 °F (37.2 °C) (Axillary)   Resp 20   Ht 5' 10\" (1.778 m)   Wt 201 lb 15.1 oz (91.6 kg)   SpO2 100%   BMI 28.98 kg/m²      Physical Exam:   General:  AAO x 3, speaking in full sentences, no accessory muscle use. Chest:   Bilateral vesicular breath sounds, no rales or wheezes. Cardiac:  S1 S2 RR, no murmurs or gallops  Abdomen: Soft, non-tender, non distended, BS audible. SKIN:  No rashes, good skin turgor. No jaundice  Extremities:  No edema, no clubbing, No cyanosis  Neuro:  AAO x 3, No FND.      Lab and Imaging Review     CBC  Recent Labs     19  0549  19  0351  19  0033 19  0315 19  0441   WBC 21.7*  --  9.0  --   --   --  8.9   HGB 6.7*  6.7*   < > 7.7*   < > 7.0* 7.1* 7.1*   HCT 22.4*  22.1*   < > 22.9*   < > 21.5* 21.4* 21.7*   MCV 91.8  --  84.5  --   --   --  88.2   MCH 27. 5  --  28.4  --   --   --  28.9   MCHC 29.9  --  33.6  --   --   --  32.7     --  226  --   --   --  181    < > = values in this interval not displayed. BMP  Recent Labs     05/24/19 2036 05/25/19  0033 05/25/19  0441    136 134*   K 3.9 3.7 4.1   CL 97* 97* 96*   CO2 28 30 27   BUN 39* 35* 31*   CREATININE 1.33* 1.18 1. 25*   GLUCOSE 399* 271* 229*   CALCIUM 7.5* 7.6* 7.7*       LFTS  Recent Labs     05/22/19 2256 05/23/19  1227   ALKPHOS 120  --    ALT 6  --    AST 6  --    PROT 8.1 5.7*   BILITOT 0.20*  --    LABALBU 3.4*  --        AMYLASE/LIPASE/AMMONIA  Recent Labs     05/22/19 2256   AMMONIA 29       PT/INR  Recent Labs     05/22/19 2256   PROTIME 11.1   INR 1.1       ANEMIA STUDIES  No results for input(s): LABIRON, TIBC, FERRITIN, [id number], FOLATE, OCCULTBLD in the last 72 hours. IMAGING      ENDOSCOPY       EGD 05/23/2019    PREOPERATIVE DIAGNOSIS:      coffee ground emesis . Drop in HGB      Diagnosis:  The entire esophagus is severely inflamed with white to black d/c , not sure if there is related to caustic ingestion or a combination of Candida esophagitis and reflux esophagitis, did not want to biopsy and cause bleeding   the stomach is  Clean  , no blood fresh or old   NGT was removed before the procedure and replaced under diretc visualization   After we were done        Recommendations/Plan:   1. Diflucan  2. PPI   3. Carafate liquid   4. Monitor very closely for complications and perforation      Electronically signed by Carlos De Jesus MD  on 5/23/2019 at 2:58 PM          Assessment:     1. 77 y/o male with poorly controlled type  II DM, PVD, nonhealing right foot ulcers, admitted with coffee-ground emesis, anemia, DKA, right foot wound and sepsis. 1.  Acute blood loss anemia - coffee-ground hematemesis  - Resolved  - s/p EGD 05/23/2019: Severe esophagitis (see detailed report)    (had received 3 units PRBCs - last transfusion 05/23/2019)   - Hgb stable     2. Severe esophagitis - ? Candida and reflux esophagitis  - Denies dysypagia or odynophagia with liquids         Plan:     1. Continue PPI gtt and Carafate 1 gm four times daily  2. Advance to [place] diet  3. Continue Diflucan   4. Trend Hgb  5. Serial abdominal examinations - observe for perforation or complications   6. Will need out patient EGD in 4-6 weeks to assess for healing   7. Please call GI with any questions, concerns or acute change in clinical status       This plan was formulated in collaboration with Dr. Bal Saucedo    Electronically signed by Todd Short CNP on 5/25/2019 at 7:23 AM    Please note that this note was generated using a voice recognition dictation software. Although every effort was made to ensure the accuracy of this automated transcription, some errors in transcription may have occurred.

## 2019-05-25 NOTE — PROGRESS NOTES
Critical Care Team - Daily Progress Note      Date and time: 5/25/2019 11:23 AM  Patient's name:  Onelia Good  Medical Record Number: [id number]  Patient's account/billing number: [de-identified]  Patient's YOB: 1950  Age: 76 y.o. Date of Admission: 5/22/2019 10:35 PM  Length of stay during current admission: 2      Primary Care Physician: Eliseo Smith MD  ICU Attending Physician: Dr. Eason Duty    Code Status: Full Code    Reason for ICU admission: DKA, sepsis, GIB      SUBJECTIVE:     OVERNIGHT EVENTS:           Seen and examined at bedside. No acute events. No hematemesis, starting to have formed stools. NG tube out, tolerating diet. On Zosyn for foot gangrene, no fevers. Vitals stable.      AWAKE & FOLLOWING COMMANDS:  [] No   [x] Yes    CURRENT VENTILATION STATUS:     [] Ventilator  [] BIPAP  [x] Nasal Cannula [] Room Air      IF INTUBATED, ET TUBE MARKING AT LOWER LIP:       cms    SECRETIONS Amount:  [] Small [] Moderate  [] Large  [x] None  Color:     [] White [] Colored  [] Bloody    SEDATION:  RAAS Score:  [] Propofol gtt  [] Versed gtt  [] Ativan gtt   [x] No Sedation    PARALYZED:  [x] No    [] Yes    DIARRHEA:                [x] No                [] Yes  (C. Difficile status: [] positive                                                                                                                       [] negative                                                                                                                     [] pending)    VASOPRESSORS:  [x] No    [] Yes    If yes -   [] Levophed       [] Dopamine     [] Vasopressin       [] Dobutamine  [] Phenylephrine         [] Epinephrine    CENTRAL LINES:     [x] No   [] Yes   (Date of Insertion:   )           If yes -     [] Right IJ     [] Left IJ [] Right Femoral [] Left Femoral                   [] Right Subclavian [] Left Subclavian       QUIÑONES'S CATHETER:   [x] No   [] Yes  (Date of Insertion:   )     URINE OUTPUT: Intravenous Q24H    sucralfate  1 g Oral 4 times per day     Continuous Infusions:   IV infusion builder 100 mL/hr at 05/25/19 0955    pantoprozole (PROTONIX) infusion 8 mg/hr (05/25/19 0503)    dextrose       PRN Meds:     potassium chloride 10 mEq PRN   sodium chloride flush 10 mL PRN   magnesium hydroxide 30 mL Daily PRN   ondansetron 4 mg Q6H PRN   senna 1 tablet Daily PRN   acetaminophen 650 mg Q4H PRN   glucose 15 g PRN   dextrose 12.5 g PRN   glucagon (rDNA) 1 mg PRN   dextrose 100 mL/hr PRN         VENT SETTINGS (Comprehensive) (if applicable): Additional Respiratory  Assessments  Pulse: 104  Resp: 22  SpO2: 100 %  Oral Care: Suction toothette, Mouth moisturizer, Mouth swabbed, Lip moisturizer applied    ABGs:     Laboratory findings:    Complete Blood Count: Recent Labs     05/23/19  0549  05/24/19  0351  05/25/19  0033 05/25/19  0315 05/25/19 0441   WBC 21.7*  --  9.0  --   --   --  8.9   HGB 6.7*  6.7*   < > 7.7*   < > 7.0* 7.1* 7.1*   HCT 22.4*  22.1*   < > 22.9*   < > 21.5* 21.4* 21.7*     --  226  --   --   --  181    < > = values in this interval not displayed. Last 3 Blood Glucose:   Recent Labs     05/24/19 2036 05/25/19 0033 05/25/19  0441   GLUCOSE 399* 271* 229*        PT/INR:    Lab Results   Component Value Date    PROTIME 11.1 05/22/2019    INR 1.1 05/22/2019     PTT:    Lab Results   Component Value Date    APTT 24.7 05/22/2019       Comprehensive Metabolic Profile:   Recent Labs     05/22/19  2256  05/23/19  1227  05/24/19 2036 05/25/19  0033 05/25/19  0441   *   < > 143   < > 136 136 134*   K 6.6*   < > 4.2   < > 3.9 3.7 4.1   CL 79*   < > 99   < > 97* 97* 96*   CO2 6*   < > 20   < > 28 30 27   *   < > 95*   < > 39* 35* 31*   CREATININE 4.55*   < > 2.29*   < > 1.33* 1.18 1. 25*   GLUCOSE 830*   < > 298*   < > 399* 271* 229*   CALCIUM 9.3   < > 8.3*   < > 7.5* 7.6* 7.7*   PROT 8.1  --  5.7*  --   --   --   --    LABALBU 3.4*  --   --   --   --   -- resolved   ckd 3       PLAN:     WEAN PER PROTOCOL:  [] No   [] Yes  [x] N/A    DISCONTINUE ANY LABS:   [x] No   [] Yes    ICU PROPHYLAXIS:  Stress ulcer:  [x] PPI Agent  [] [id number] [] Sucralfate  [] Other:  VTE:   [] Enoxaparin  [] Unfract. Heparin Subcut  [] EPC Cuffs    NUTRITION:  [] NPO [] Tube Feeding (Specify: ) [] TPN  [x] PO (Diet: Dietary Nutrition Supplements: Clear Liquid Oral Supplement  DIET CARB CONTROL; Carb Control: 3 carb choices (45 gms)/meal; No Added Salt (3-4 GM))    HOME MEDICATIONS RECONCILED: [] No  [x] Yes    INSULIN DRIP:   [x] No   [] Yes    CONSULTATION NEEDED:  [x] No   [] Yes    FAMILY UPDATED:    [x] No   [] Yes    TRANSFER OUT OF ICU:   [] No   [x] Yes    ADDITIONAL PLAN:    CV  - telemetry  - lipitor     Pulm  - monitor o2 88-92%     FEN/GI  - Severe esophagitis on endoscopy, continue Diflucan, continue Protonix drip and Carafate; GI okay with diet, outpatient endoscopy 4-6 weeks  - Hemoglobin stable at 7.1  - Monitor and replace electrolytes  - Insulin sliding scale and lantus  - Nephrology following for acute kidney injury     Heme/ID  - Hemoglobin stable 7.7  - Afebrile, monitor wbc, 9.0 today  - Blood culture positive for gram-positive rods; continue Zosyn for left heel wet gangrene; podiatry following and has been doing local wound care, may need OR for debridement; requesting ID consult     Dispo  - transfer out of ICU     PT/OT/Social work      Lisa Cárdenas M.D. Department of Internal Medicine/ Critical care  [address] [place] [address])             5/25/2019, 11:23 AM    Attending Physician Statement  I have discussed the care of Alvaro Mcmillan, including pertinent history and exam findings,  with the resident. I have seen and examined the patient and the key elements of all parts of the encounter have been performed by me. I agree with the assessment, plan and orders as documented by the resident with additions .   Id esperanza   Ok to floor        Total critical care time caring for this patient with life threatening, unstable organ failure, including direct patient contact, management of life support systems, review of data including imaging and labs, discussions with other team members and physicians at least 27   Min so far today, excluding procedures. Treatment plan Discussed with nursing staff in detail , all questions answered . Electronically signed by Oc Nguyen MD on   5/25/19 at 3:01 PM    Please note that this chart was generated using voice recognition Dragon dictation software. Although every effort was made to ensure the accuracy of this automated transcription, some errors in transcription may have occurred.

## 2019-05-25 NOTE — PLAN OF CARE
Problem: Risk for Impaired Skin Integrity  Goal: Tissue integrity - skin and mucous membranes  Description  Structural intactness and normal physiological function of skin and  mucous membranes.   Outcome: Ongoing     Problem: Falls - Risk of:  Goal: Will remain free from falls  Description  Will remain free from falls  Outcome: Ongoing  Goal: Absence of physical injury  Description  Absence of physical injury  Outcome: Ongoing     Problem: Nutrition  Goal: Optimal nutrition therapy  Outcome: Ongoing

## 2019-05-25 NOTE — PROGRESS NOTES
Critical care team - Resident sign-out to medicine service      Date and time: 5/25/2019 3:04 PM  Patient's name:  Lexi Murry Record Number: [id number]  Patient's account/billing number: [de-identified]  Patient's YOB: 1950  Age: 76 y.o. Date of Admission: 5/22/2019 10:35 PM  Length of stay during current admission: 2    Primary Care Physician: Aisha Strange MD    Code Status: Full Code    Mode of physician to physician communication:        [x] Via telephone   [] In person     Date and time of sign-out: 5/25/2019 3:04 PM    Accepting Internal Medicine resident: Dr. Ghada Aranda    Accepting Medicine team: IM Team 3    Accepting team's attending: Dr. Ismael Chou    Patient's current ICU Bed:  124     Patient's assigned bed on floor:  426        [] Med-Surg Monitored [x] Step-down       [] Psychiatry ICU       [] Psych floor     Reason for ICU admission:     Sepsis, DKA, foot ulcer, GIB    ICU course summary:     Patient here for DKA which has resolved, on lantus and ISS. Foot ulcer being followed by podiatry with bedside debridements and dressing changes, on zosyn, ID following. GIB with upper endoscopy showing severe esophagitis, on diflucan, protonix drip and carafate. Hemodynamically stable. NG removed. Hgb stable. Kidney function improving.      Procedures during patient's ICU stay:     A-line  Upper endoscopy  NG tube    Current Vitals:     BP (!) 124/48   Pulse 101   Temp 99.1 °F (37.3 °C) (Oral)   Resp 24   Ht 5' 10\" (1.778 m)   Wt 201 lb 15.1 oz (91.6 kg)   SpO2 91%   BMI 28.98 kg/m²       Cultures:     Blood cultures:                 [] None drawn      [] Negative             [x]  Positive (Details: Gram + rods )  Urine Culture:                   [x] None drawn      [] Negative             []  Positive (Details:  )  Sputum Culture:               [x] None drawn       [] Negative             []  Positive (Details:  )   Endotracheal aspirate:     [x] None drawn       [] Negative []  Positive (Details:  )       Consults:     1. Nephro  2. Podiatry  3. Vascular  4. GI  5. ID    Assessment:     Patient Active Problem List    Diagnosis Date Noted    Anemia     Upper GI bleed 05/23/2019    Hyperkalemia     DM type 2, uncontrolled, with neuropathy ([facility] 09/21/2017    Skin ulcer ([facility] 08/23/2017    Hammer toe of right foot 08/23/2017    Neuropathy 07/26/2017    Essential hypertension 07/26/2017    Type 2 diabetes mellitus with insulin therapy ([facility] 07/26/2017    Pure hypercholesterolemia 10/25/2016    Hypertriglyceridemia 10/25/2016         Recommended Follow-up:     1. Monitor hemoglobin, GI recs  2. Monitor blood glucose  3. Abx, ID recs and podiatry recs  4. PT/OT  5. Social work        Above mentioned assessment and plan was discussed by me with the admitting medicine resident. The medicine team assigned to the patient by medicine admitting resident will be following up the patient from now onwards on the floor. Boyd Warren M.D.   Critical care resident,  Department of Internal Medicine/ Critical care  [address] [facility] [place] [facility]")             5/25/2019, 3:04 PM

## 2019-05-25 NOTE — PROGRESS NOTES
INTERNAL MEDICINE                                                                             ICU PATIENT TRANSFER NOTE        Patient:  Taiwo Kenny  YOB: 1950  MRN: [id number]     Acct: [de-identified]     Admit date: 5/22/2019    Code Status:-  Full code     Reason for ICU Admission:-   GI bleed    SUPPORT DEVICES: [] Ventilator [] BIPAP  [] Nasal Cannula [x] Room Air    Consultations:- [] Cardiology [] Nephrology  [] Hemo onco  [] GI                               [] ID [] ENT  [] Rheum [] Endo   []Physiotherapy                                 Others:-     NUTRITION:  [] NPO [] Tube Feeding (Specify: ) [] TPN  [x] PO    Central Lines:- [x] No   [] Yes           If yes - Days/Date of Insertion. Pt seen,examined and Chart reviewed. ICU COURSE :-      Patient admitted for GI bleed and DKA. DKA resolved, on lantus and ISS. Foot ulcer being followed by podiatry with bedside debridements and dressing changes, on zosyn, ID following. GIB with upper endoscopy showing severe esophagitis, on diflucan, protonix drip and carafate. Hemodynamically stable. NG removed. Hgb stable. Kidney function improving. Vascular signed off. Physical Exam:   Vitals: BP (!) 143/62   Pulse 104   Temp 99.1 °F (37.3 °C) (Oral)   Resp 19   Ht 5' 10\" (1.778 m)   Wt 201 lb 15.1 oz (91.6 kg)   SpO2 94%   BMI 28.98 kg/m²   24 hour intake/output:    Intake/Output Summary (Last 24 hours) at 5/25/2019 1622  Last data filed at 5/25/2019 1200  Gross per 24 hour   Intake 2012.6 ml   Output 2900 ml   Net -887.4 ml     Last 3 weights:   Wt Readings from Last 3 Encounters:   05/25/19 201 lb 15.1 oz (91.6 kg)   04/22/19 200 lb (90.7 kg)   04/15/19 211 lb (95.7 kg)       General appearance: alert and cooperative with exam  HEENT: Head: Normocephalic, no lesions, without obvious abnormality. Neck: no adenopathy, no carotid bruit, no JVD, supple, symmetrical, trachea midline and thyroid not enlarged, symmetric, no tenderness/mass/nodules  Lungs: clear to auscultation bilaterally  Heart: regular rate and rhythm, S1, S2 normal, no murmur, click, rub or gallop  Abdomen: soft, non-tender; bowel sounds normal; no masses,  no organomegaly  Extremities: extremities normal, atraumatic, no cyanosis or edema  Neurologic: Mental status: Alert, oriented, thought content appropriate      Medications:Current Inpatient  Scheduled Meds:   QUEtiapine  300 mg Oral Nightly    IVPB builder   Intravenous Q8H    insulin glargine  30 Units Subcutaneous BID    insulin lispro  0-12 Units Subcutaneous TID WC    insulin lispro  0-6 Units Subcutaneous Nightly    lidocaine   Topical Once    citalopram  30 mg Oral Daily    atorvastatin  40 mg Oral Daily    sodium chloride flush  10 mL Intravenous 2 times per day    gabapentin  100 mg Oral TID    sodium chloride  250 mL Intravenous Once    fluconazole  100 mg Intravenous Q24H    sucralfate  1 g Oral 4 times per day     Continuous Infusions:   IV infusion builder 50 mL/hr at 05/25/19 1433    pantoprozole (PROTONIX) infusion 8 mg/hr (05/25/19 0503)    dextrose       PRN Meds:potassium chloride, sodium chloride flush, magnesium hydroxide, ondansetron, senna, acetaminophen, glucose, dextrose, glucagon (rDNA), dextrose    Objective:    CBC:   Recent Labs     05/23/19  0549  05/24/19  0351  05/25/19  0033 05/25/19  0315 05/25/19  0441   WBC 21.7*  --  9.0  --   --   --  8.9   HGB 6.7*  6.7*   < > 7.7*   < > 7.0* 7.1* 7.1*     --  226  --   --   --  181    < > = values in this interval not displayed. BMP:    Recent Labs     05/24/19  2036 05/25/19  0033 05/25/19  0441    136 134*   K 3.9 3.7 4.1   CL 97* 97* 96*   CO2 28 30 27   BUN 39* 35* 31*   CREATININE 1.33* 1.18 1. 25*   GLUCOSE 399* 271* 229*     Calcium:  Recent Labs     05/25/19 0441 emelina.        Julianna Page MD  PGY-3, Internal Medicine Resident  [facility], [place], [place]

## 2019-05-25 NOTE — PROGRESS NOTES
Renal Progress Note    Patient :  Milena Dickinson; 76 y.o. MRN# [id number]  Location:  [facility]/2410-42  Attending:  Duke Sims MD  Admit Date:  5/22/2019   Hospital Day: 2      Subjective: The patient is awake and alert and comfortable in the ICU. Renal function continues to improve. He is taking a recent mild oral intake. IV fluids continue, urine output good, blood sugars better controlled. Acidosis and acidemia resolved. Local wound care to right foot wound continues. Results of EGD reviewed. High suspicion for candidiasis hepatitis and reflux esophagitis based on severe inflammatory changes in the entire esophagus. Currently on Diflucan and also on Carafate. Hemodynamic stable. One blood culture positive for gram-positive cocci likely contaminant. One growing MRSA. Labs today, 5/25/19 show sodium 134, potassium 4.1, chloride 96, CO2 27 with BUN 31 creatinine 1.25 and an estimated GFR 57 mL/m with calcium 7.7 and phosphorus 2. CBC shows white blood cell count is 8.9 million hemoglobin 7.1, hematocrit 21.7 and platelets 202. Ultrasound scan of kidney reviewed, kidney size 12 cm without any overt structural changes. Mild proteinuria about 300-400 mg. Rest of the workup negative so far. Outpatient Medications:     Medications Prior to Admission: Soft Lens Products (STERILE SALINE) SOLN, Apply to wound. Cleanse wound than apply as a wet to dry dressing. Gauze Pads & Dressings (GAUZE DRESSING) 4\"X4\" PADS, Apply to wound. Gauze Bandages (ROLLED GAUZE BANDAGE 4\"X2. 5YD) MISC, Apply to wound. collagenase (SANTYL) 250 UNIT/GM ointment, Apply topically daily. Gauze Pads & Dressings (GAUZE DRESSING) 4\"X4\" PADS, Apply to wound. Gauze Bandages (ROLLED GAUZE BANDAGE 4\"X2. 5YD) MISC, Apply to wound. Soft Lens Products (SALINE) 0.9 % SOLN, Apply 1 Bottle topically 2 times daily Apply a wet to dry twice a day.  Can also use to cleanse area prior to dressing  blood glucose monitor strips, Check blood sugars daily as directed. atorvastatin (LIPITOR) 40 MG tablet, Take 1 tablet by mouth daily  gabapentin (NEURONTIN) 300 MG capsule, Take 1 capsule by mouth 3 times daily for 180 days. Intended supply: 30 days  Wound Dressings (FIBRACOL) MISC, Apply to wound. Continuous Blood Gluc  (FREESTYLE SOFI 14 DAY READER) DONG, Use daily  Continuous Blood Gluc Sensor (FREESTYLE SOFI 14 DAY SENSOR) [facility], Use daily  lisinopril (PRINIVIL;ZESTRIL) 5 MG tablet, Take 1 tablet by mouth daily  diclofenac (VOLTAREN) 75 MG EC tablet, Take 1 tablet by mouth 2 times daily (with meals)  insulin glargine (TOUJEO SOLOSTAR) 300 UNIT/ML injection pen, Inject 50 Units into the skin nightly  metFORMIN (GLUCOPHAGE) 1000 MG tablet, Take 1 tablet by mouth 2 times daily (with meals)  citalopram (CELEXA) 20 MG tablet, Take 30 mg by mouth daily   QUEtiapine (SEROQUEL) 300 MG tablet, Take 300 mg by mouth daily     Current Medications:     Scheduled Meds:    QUEtiapine  300 mg Oral Nightly    IVPB builder   Intravenous Q8H    insulin glargine  30 Units Subcutaneous BID    insulin lispro  0-12 Units Subcutaneous TID WC    insulin lispro  0-6 Units Subcutaneous Nightly    lidocaine   Topical Once    citalopram  30 mg Oral Daily    atorvastatin  40 mg Oral Daily    sodium chloride flush  10 mL Intravenous 2 times per day    gabapentin  100 mg Oral TID    sodium chloride  250 mL Intravenous Once    fluconazole  100 mg Intravenous Q24H    sucralfate  1 g Oral 4 times per day     Continuous Infusions:    IV infusion builder 100 mL/hr at 05/25/19 0955    pantoprozole (PROTONIX) infusion 8 mg/hr (05/25/19 0503)    dextrose       PRN Meds:  potassium chloride, sodium chloride flush, magnesium hydroxide, ondansetron, senna, acetaminophen, glucose, dextrose, glucagon (rDNA), dextrose    Input/Output:       I/O last 3 completed shifts: In: 2686 [I.V.:2628; IV Piggyback:58]  Out: 2900 [Urine:2900].       Patient Vitals for the past 96 hrs (Last 3 readings):   Weight   19 0400 201 lb 15.1 oz (91.6 kg)   19 0412 197 lb 12 oz (89.7 kg)   19 0500 198 lb 6.6 oz (90 kg)       Vital Signs:   Temperature:  Temp: 97.5 °F (36.4 °C)  TMax:   Temp (24hrs), Av.4 °F (36.9 °C), Min:97.5 °F (36.4 °C), Max:99 °F (37.2 °C)    Respirations:  Resp: 20  Pulse:   Pulse: 106  BP:    BP: 100/60  BP Range: Systolic (44ZRN), XML:548 , Min:79 , CYH:563       Diastolic (61STZ), XXU:92, Min:42, Max:88      Physical Examination:     General:  AAO x 3, speaking in full sentences, no accessory muscle use. Moist mucous membranes  HEENT: Atraumatic, normocephalic, moist mucosa. Eyes:   Pupils equal, round, EOMI. Pale conjunctiva with no scleral icterus  Neck:   Neck veins are flat, midline trachea, no accessory muscle use  Chest:   Good bilateral air entry and clear to auscultation bilaterally without any wheezes or rales or rhonchi  Cardiac:  S1 S2 RR, no murmurs, gallops or rubs   Abdomen: Soft, non-tender, no masses or organomegaly, BS audible. Neuro:  AAO x 3   SKIN:  No rashes, good skin turgor. Extremities:  No edema, no calf tenderness. Labs:       Recent Labs     19  0549  19  0351  19  0033 19  0315 19  0441   WBC 21.7*  --  9.0  --   --   --  8.9   RBC 2.44*  --  2.71*  --   --   --  2.46*   HGB 6.7*  6.7*   < > 7.7*   < > 7.0* 7.1* 7.1*   HCT 22.4*  22.1*   < > 22.9*   < > 21.5* 21.4* 21.7*   MCV 91.8  --  84.5  --   --   --  88.2   MCH 27.5  --  28.4  --   --   --  28.9   MCHC 29.9  --  33.6  --   --   --  32.7   RDW 13.6  --  13.6  --   --   --  13.5     --  226  --   --   --  181   MPV 10.9  --  10.1  --   --   --  9.8    < > = values in this interval not displayed. BMP:   Recent Labs     19  0033 19  0441    136 134*   K 3.9 3.7 4.1   CL 97* 97* 96*   CO2 28 30 27   BUN 39* 35* 31*   CREATININE 1.33* 1.18 1. 25*   GLUCOSE 399* 271* 229*   CALCIUM 7.5* 7.6* 7.7*      Phosphorus:     Recent Labs     05/23/19  0549 05/24/19  0351 05/25/19  0441   PHOS 5.4* 2.2* 2.0*     Magnesium:    Recent Labs     05/23/19  0549 05/24/19  0351 05/25/19  0441   MG 2.5 2.2 1.8     Albumin:    Recent Labs     05/22/19  2256   LABALBU 3.4*     BNP:      Lab Results   Component Value Date    BNP 43 02/05/2013     IKER:      Lab Results   Component Value Date    IKER NEGATIVE 05/23/2019     SPEP:  Lab Results   Component Value Date    PROT 5.7 05/23/2019    ALBCAL 2.6 05/23/2019    ALBPCT 46 05/23/2019    LABALPH 0.3 05/23/2019    LABALPH 0.9 05/23/2019    A1PCT 5 05/23/2019    A2PCT 16 05/23/2019    LABBETA 0.9 05/23/2019    BETAPCT 17 05/23/2019    GAMGLOB 0.9 05/23/2019    GGPCT 16 05/23/2019    PATH ELECTRONICALLY SIGNED. Stefania Hernandez M.D. 05/23/2019    PATH ELECTRONICALLY SIGNED.  Stefania Hernandez M.D. 05/23/2019     UPEP:   No results found for: LABPE  C3:     Lab Results   Component Value Date    C3 127 05/23/2019     C4:     Lab Results   Component Value Date    C4 27 05/23/2019     MPO ANCA:   No results found for: MPO  PR3 ANCA:   No results found for: PR3  Anti-GBM:   No results found for: GBMABIGG  Hep BsAg:         Lab Results   Component Value Date    HEPBSAG NONREACTIVE 03/29/2016     Hep C AB:          Lab Results   Component Value Date    HEPCAB NONREACTIVE 03/29/2016       Urinalysis/Chemistries:      Lab Results   Component Value Date    NITRU NEGATIVE 05/23/2019    COLORU YELLOW 05/23/2019    PHUR 5.0 05/23/2019    WBCUA None 05/23/2019    RBCUA 5 TO 10 05/23/2019    MUCUS NOT REPORTED 05/23/2019    TRICHOMONAS NOT REPORTED 05/23/2019    YEAST NOT REPORTED 05/23/2019    BACTERIA NOT REPORTED 05/23/2019    SPECGRAV 1.022 05/23/2019    LEUKOCYTESUR NEGATIVE 05/23/2019    UROBILINOGEN Normal 05/23/2019    BILIRUBINUR NEGATIVE 05/23/2019    GLUCOSEU 3+ 05/23/2019    KETUA SMALL 05/23/2019    AMORPHOUS NOT REPORTED 05/23/2019     Urine Sodium:   No results found for: diet/TF. Avoid nephrotoxic drugs/contrast exposure. We will continue to follow along with you.        Flor Osorio MD  [facility]

## 2019-05-25 NOTE — PROGRESS NOTES
Pharmacy Note  Vancomycin Consult    Mekhi Belcher is a 76 y.o. male started on Vancomycin for SSTI; consult received from Dr. Sindi Rojas to manage therapy. Also receiving the following antibiotics: Zosyn. Patient Active Problem List   Diagnosis    Pure hypercholesterolemia    Hypertriglyceridemia    Neuropathy    Essential hypertension    Type 2 diabetes mellitus with insulin therapy ([facility] [address]    Skin ulcer ([facility] [address]    Hammer toe of right foot    DM type 2, uncontrolled, with neuropathy ([facility] [address]    Upper GI bleed    Hyperkalemia    Anemia    Type 2 diabetes mellitus with left diabetic foot infection (HCC)    Candida esophagitis (HCC)    Bandemia    Fever chills    Sepsis associated hypotension (HCC)       Allergies:  Keflex [cephalexin]       Recent Labs     05/25/19  0033 05/25/19  0441   BUN 35* 31*       Recent Labs     05/25/19  0033 05/25/19  0441   CREATININE 1.18 1. 25*       Recent Labs     05/24/19  0351 05/25/19  0441   WBC 9.0 8.9         Intake/Output Summary (Last 24 hours) at 5/25/2019 1745  Last data filed at 5/25/2019 1200  Gross per 24 hour   Intake 2012.6 ml   Output 2900 ml   Net -887.4 ml       Culture Date      Source                       Results  Foot = MRSA and Ecoli  Blood = coag - staph    Ht Readings from Last 1 Encounters:   05/23/19 5' 10\" (1.778 m)        Wt Readings from Last 1 Encounters:   05/25/19 201 lb 15.1 oz (91.6 kg)         Body mass index is 28.98 kg/m². Estimated Creatinine Clearance: 64 mL/min (A) (based on SCr of 1.25 mg/dL (H)). Goal Trough Level: 14/17 mcg/mL    Assessment/Plan:  Will initiate vancomycin 1250 mg IV every 24 hours. Timing of trough level will be determined based on culture results, renal function, and clinical response. Thank you for the consult. Will continue to follow.

## 2019-05-25 NOTE — CONSULTS
[facility] -   Infectious diseases evaluation  admission date 5/22/2019    reason for consultation:   Left leg wound    Impression :   Current:  · DKA - DM  · Sepsis - shock -- resolved  · Leukocytosis  · Left heel wet gangrene  · severe candida esophagitis vs reflux esophagitis - UGI bleed, EGD 5/23/19  - friable - no biopsy  · ARF    Other:  ·   Discussion / summary of stay / plan of care   ·   Recommendations   · Started on zosyn and fluconazole  · post vanco once 5/23/19 - Resume vanco dose today, pharm to dose- follow creat- aim trough 14-17  · Betadine to the heel wound  · Will need MRI left foot to decide next surg step  · adjust antibiotics per final cx  · Will follow - disc w pt and RN    Infection Control Recommendations   · [place] Precautions  · Contact Isolation     Antimicrobial Stewardship Recommendations   · Simplification of therapy  · Targeted therapy  · Per Kg dosing      Coordination ofOutpatient Care:   · Estimated Length of IV antimicrobials:  · Patient will need Midline / picc Catheter Insertion:   · Patient will need SNF:  · Patient will need outpatient wound care:     History of Present Illness:   Initial history:  Lydia Padgett is a 76y.o.-year-old male presented to ER 5/23 w coffee grund emesis - found hypotensive and in DKA, left ankle wet gangrene. Lactic acidosis 3.7, ARF, hyperkalemia, anemia and received blood. Has had the left heel ulcer and was not attending it - now wet gangrene and redness around it. Leukocytosis  27  vasc and podiatry are on the case. We are called for the left heel necrosis and infection. WBC now is better at 8.9  Creat 0.25    Pt now able to swallow better - left ankle seen and still foul w soft necrotic ankle tissue.                  Interval changes  5/25/2019     Summary of relevant labs:  Labs:  W 27 -8.9  Micro:  blood cx x 1 bacillus  Left foot cx MRSA and E coli  U cx SC neg    Imaging: Foot Xray neg for OM  US renal neg  CXR 5/22/19 bibasilar atelectasis     I have personally reviewed the past medical history, past surgical history, medications, social history, and family history, and I haveupdated the database accordingly.   Past Medical History:     Past Medical History:   Diagnosis Date    Depression     Diabetes ([facility] [address]     DM (diabetes mellitus screen)     Hyperlipemia     Insomnia     Osteoarthritis        Past Surgical  History:     Past Surgical History:   Procedure Laterality Date    HERNIA REPAIR      TONSILLECTOMY      UPPER GASTROINTESTINAL ENDOSCOPY N/A 5/23/2019    EGD DIAGNOSTIC ONLY performed by Dina Nazario MD at [facility] OR       Medications:      [START ON 5/26/2019] heparin (porcine)  5,000 Units Subcutaneous 3 times per day    QUEtiapine  300 mg Oral Nightly    IVPB builder   Intravenous Q8H    insulin glargine  30 Units Subcutaneous BID    insulin lispro  0-12 Units Subcutaneous TID WC    insulin lispro  0-6 Units Subcutaneous Nightly    lidocaine   Topical Once    citalopram  30 mg Oral Daily    atorvastatin  40 mg Oral Daily    sodium chloride flush  10 mL Intravenous 2 times per day    gabapentin  100 mg Oral TID    sodium chloride  250 mL Intravenous Once    fluconazole  100 mg Intravenous Q24H    sucralfate  1 g Oral 4 times per day       Social History:     Social History     Socioeconomic History    Marital status: Single     Spouse name: Not on file    Number of children: Not on file    Years of education: Not on file    Highest education level: Not on file   Occupational History    Not on file   Social Needs    Financial resource strain: Not on file    Food insecurity:     Worry: Not on file     Inability: Not on file    Transportation needs:     Medical: Not on file     Non-medical: Not on file   Tobacco Use    Smoking status: Former Smoker     Packs/day: 2.00     Years: 25.00     Pack years: 50.00     Types: Cigarettes Last attempt to quit: 1995     Years since quittin.8    Smokeless tobacco: Never Used   Substance and Sexual Activity    Alcohol use: No     Alcohol/week: 0.0 oz     Comment: occassionally    Drug use: No    Sexual activity: Not on file   Lifestyle    Physical activity:     Days per week: Not on file     Minutes per session: Not on file    Stress: Not on file   Relationships    Social connections:     Talks on phone: Not on file     Gets together: Not on file     Attends [employer] service: Not on file     Active member of club or organization: Not on file     Attends meetings of clubs or organizations: Not on file     Relationship status: Not on file    Intimate partner violence:     Fear of current or ex partner: Not on file     Emotionally abused: Not on file     Physically abused: Not on file     Forced sexual activity: Not on file   Other Topics Concern    Not on file   Social History Narrative    Not on file       Family History:     Family History   Problem Relation Age of Onset    Alzheimer's Disease Mother     Arthritis Mother     Diabetes Father     Heart Disease Father     Heart Disease Paternal Grandfather         Allergies:   Keflex [cephalexin]     Review of Systems:     Review of Systems   Constitutional: Negative for activity change and appetite change. HENT: Negative for congestion. Eyes: Negative for itching. Respiratory: Negative for choking. Cardiovascular: Negative for chest pain. Gastrointestinal: Negative for abdominal pain. Endocrine: Negative for heat intolerance. Genitourinary: Negative for dysuria. Musculoskeletal: Negative for arthralgias. Skin: Positive for color change and wound. Allergic/Immunologic: Negative for food allergies. Neurological: Negative for dizziness. Hematological: Negative for adenopathy. Psychiatric/Behavioral: Negative for agitation.        Physical Examination :     Patient Vitals for the past 8 hrs:   BP Temp Temp src Pulse Resp SpO2   05/25/19 1701 136/60 101.1 °F (38.4 °C) -- 105 20 --   05/25/19 1500 (!) 143/62 -- -- 104 19 94 %   05/25/19 1400 (!) 124/48 -- -- 101 24 91 %   05/25/19 1300 133/61 -- -- 114 20 94 %   05/25/19 1200 123/62 99.1 °F (37.3 °C) Oral 91 25 98 %   05/25/19 1100 (!) 127/51 -- -- 113 22 99 %   05/25/19 1000 100/60 -- -- 106 20 98 %       Physical Exam   Constitutional: He is oriented to person, place, and time. He appears well-developed and well-nourished. No distress. HENT:   Head: Normocephalic and atraumatic. Mouth/Throat: No oropharyngeal exudate. Eyes: Pupils are equal, round, and reactive to light. Conjunctivae are normal. No scleral icterus. Neck: Neck supple. No tracheal deviation present. Cardiovascular: Normal rate and regular rhythm. Exam reveals no friction rub. No murmur heard. Pulmonary/Chest: No stridor. No respiratory distress. He has no wheezes. Abdominal: Soft. He exhibits no distension. There is no tenderness. There is no guarding. Genitourinary:   Genitourinary Comments: Urine clear   Musculoskeletal: He exhibits no edema or deformity. Neurological: He is alert and oriented to person, place, and time. No cranial nerve deficit. Skin: Skin is warm and dry. He is not diaphoretic. No erythema. No pallor. Left heel necrotic foul and very soft   Psychiatric: He has a normal mood and affect. His behavior is normal.         Medical Decision Making:   I have independently reviewed/ordered the following labs:    CBC with Differential:   Recent Labs     05/24/19  0351  05/25/19  0315 05/25/19  0441   WBC 9.0  --   --  8.9   HGB 7.7*   < > 7.1* 7.1*   HCT 22.9*   < > 21.4* 21.7*     --   --  181   LYMPHOPCT 11*  --   --  13*   MONOPCT 6  --   --  4    < > = values in this interval not displayed.      BMP:  Recent Labs     05/24/19  0351  05/25/19  0033 05/25/19  0441   *   < > 136 134*   K 3.2*   < > 3.7 4.1      < > 97* 96*   CO2 30   < > 30 27   BUN 62*   < > 35* 31*   CREATININE 1.76*   < > 1.18 1. 25*   MG 2.2  --   --  1.8    < > = values in this interval not displayed. Hepatic Function Panel:   Recent Labs     05/22/19  2256 05/23/19  1227   PROT 8.1 5.7*   LABALBU 3.4*  --    BILITOT 0.20*  --    ALKPHOS 120  --    ALT 6  --    AST 6  --      No results for input(s): RPR in the last 72 hours. No results for input(s): HIV in the last 72 hours. No results for input(s): BC in the last 72 hours. Lab Results   Component Value Date    CREATININE 1.25 05/25/2019    GLUCOSE 229 05/25/2019       Detailed results: Thank you for allowing us to participate in the care of this patient. Please call with questions. This note is created with the assistance of a speech recognition program.  While intending to generate adocument that actually reflects the content of the visit, the document can still have some errors including those of syntax and sound a like substitutions which may escape proof reading. It such instances, actual meaningcan be extrapolated by contextual diversion.     Gurmeet Cabrales MD  Office: [phone]  Perfect serve / office [phone]

## 2019-05-26 NOTE — PROGRESS NOTES
Renal Progress Note    Patient :  Aliza Browne; 76 y.o. MRN# [id number]  Location:  [id number]/[id number]  Attending:  Joseph Carlson MD  Admit Date:  5/22/2019   Hospital Day: 3      Subjective: The patient is awake and alert and comfortable after breakfast today. He was transferred out of ICU. Renal function is now improve further with that BUN of 17 and a creatinine of 0.98  Renal function continues to improve. He is taking better oral intake. IV fluids are as ordered, urine output good, blood sugars better controlled. Acidosis and acidemia resolved. Local wound care to right foot wound continues. Results of EGD reviewed. High suspicion for candidiasis hepatitis and reflux esophagitis based on severe inflammatory changes in the entire esophagus. Currently on Diflucan and also on Carafate. Hemodynamic stable. One blood culture positive for gram-positive cocci likely contaminant. One growing MRSA. Labs today, 5/26/19 show sodium 132, potassium 3.9, chloride 97, CO2 24 with BUN 17 creatinine 0.98 and an estimated GFR greater than 60 mL/m with calcium 8.2 and phosphorus low at 1.7 and magnesium low at 1.6. Hemoglobin is a little better at 7.6.. Ultrasound scan of kidney reviewed, kidney size 12 cm without any overt structural changes. Mild proteinuria about 300-400 mg. Rest of the workup negative so far. Outpatient Medications:     Medications Prior to Admission: Soft Lens Products (STERILE SALINE) SOLN, Apply to wound. Cleanse wound than apply as a wet to dry dressing. Gauze Pads & Dressings (GAUZE DRESSING) 4\"X4\" PADS, Apply to wound. Gauze Bandages (ROLLED GAUZE BANDAGE 4\"X2. 5YD) MISC, Apply to wound. collagenase (SANTYL) 250 UNIT/GM ointment, Apply topically daily. Gauze Pads & Dressings (GAUZE DRESSING) 4\"X4\" PADS, Apply to wound. Gauze Bandages (ROLLED GAUZE BANDAGE 4\"X2. 5YD) MISC, Apply to wound.   Soft Lens Products (SALINE) 0.9 % SOLN, Apply 1 Bottle topically 2 times daily Apply a wet to dry twice a day. Can also use to cleanse area prior to dressing  blood glucose monitor strips, Check blood sugars daily as directed. atorvastatin (LIPITOR) 40 MG tablet, Take 1 tablet by mouth daily  gabapentin (NEURONTIN) 300 MG capsule, Take 1 capsule by mouth 3 times daily for 180 days. Intended supply: 30 days  Wound Dressings (FIBRACOL) MISC, Apply to wound.   Continuous Blood Gluc  (FREESTYLE SOFI 14 DAY READER) DONG, Use daily  Continuous Blood Gluc Sensor (FREESTYLE SOFI 14 DAY SENSOR) [facility], Use daily  lisinopril (PRINIVIL;ZESTRIL) 5 MG tablet, Take 1 tablet by mouth daily  diclofenac (VOLTAREN) 75 MG EC tablet, Take 1 tablet by mouth 2 times daily (with meals)  insulin glargine (TOUJEO SOLOSTAR) 300 UNIT/ML injection pen, Inject 50 Units into the skin nightly  metFORMIN (GLUCOPHAGE) 1000 MG tablet, Take 1 tablet by mouth 2 times daily (with meals)  citalopram (CELEXA) 20 MG tablet, Take 30 mg by mouth daily   QUEtiapine (SEROQUEL) 300 MG tablet, Take 300 mg by mouth daily     Current Medications:     Scheduled Meds:    magnesium sulfate  2 g Intravenous Once    heparin (porcine)  5,000 Units Subcutaneous 3 times per day    vancomycin  1,250 mg Intravenous Q24H    QUEtiapine  300 mg Oral Nightly    IVPB builder   Intravenous Q8H    insulin glargine  30 Units Subcutaneous BID    insulin lispro  0-12 Units Subcutaneous TID WC    insulin lispro  0-6 Units Subcutaneous Nightly    lidocaine   Topical Once    citalopram  30 mg Oral Daily    atorvastatin  40 mg Oral Daily    sodium chloride flush  10 mL Intravenous 2 times per day    gabapentin  100 mg Oral TID    sodium chloride  250 mL Intravenous Once    fluconazole  100 mg Intravenous Q24H    sucralfate  1 g Oral 4 times per day     Continuous Infusions:    pantoprozole (PROTONIX) infusion 8 mg/hr (05/25/19 5428)    dextrose       PRN Meds:  sodium chloride flush, magnesium hydroxide, ondansetron, senna, acetaminophen, glucose, dextrose, glucagon (rDNA), dextrose    Input/Output:       I/O last 3 completed shifts: In: 1320 [P.O.:600; I.V.:20; IV Piggyback:700]  Out: 1400 [Urine:1400]. Patient Vitals for the past 96 hrs (Last 3 readings):   Weight   19 0400 201 lb 15.1 oz (91.6 kg)   19 0412 197 lb 12 oz (89.7 kg)   19 0500 198 lb 6.6 oz (90 kg)       Vital Signs:   Temperature:  Temp: 99.2 °F (37.3 °C)  TMax:   Temp (24hrs), Av.8 °F (37.7 °C), Min:98.6 °F (37 °C), Max:101.1 °F (38.4 °C)    Respirations:  Resp: 20  Pulse:   Pulse: 126  BP:    BP: 139/72  BP Range: Systolic (11IHC), HKY:127 , Min:123 , RVQ:973       Diastolic (87GNG), YLI:06, Min:48, Max:73      Physical Examination:     General:  AAO x 3, speaking in full sentences, no accessory muscle use. Moist mucous membranes  HEENT: Atraumatic, normocephalic, moist mucosa. Eyes:   Pupils equal, round, EOMI. Pale conjunctiva with no scleral icterus  Neck:   Neck veins are flat, midline trachea, no accessory muscle use  Chest:   Good bilateral air entry and clear to auscultation bilaterally without any wheezes or rales or rhonchi, able to lay relatively flat without orthopnea  Cardiac:  Regular rate and rhythm with positive S1 and S2 and no rubs  Abdomen: Soft, non-tender, mildly distended with active bowel sounds   Neuro:  AAO x 3   SKIN:  No rashes, fair skin turgor. Extremities:  No edema, no calf tenderness. Labs:       Recent Labs     19  0351  19  0315 19  0441 19  0530   WBC 9.0  --   --  8.9  --    RBC 2.71*  --   --  2.46*  --    HGB 7.7*   < > 7.1* 7.1* 7.6*   HCT 22.9*   < > 21.4* 21.7* 23.8*   MCV 84.5  --   --  88.2  --    MCH 28.4  --   --  28.9  --    MCHC 33.6  --   --  32.7  --    RDW 13.6  --   --  13.5  --      --   --  181  --    MPV 10.1  --   --  9.8  --     < > = values in this interval not displayed.       BMP:   Recent Labs     19  0033 19  0441 19  0530    134* 132* K 3.7 4.1 3.9   CL 97* 96* 97*   CO2 30 27 24   BUN 35* 31* 17   CREATININE 1.18 1.25* 0.98   GLUCOSE 271* 229* 179*   CALCIUM 7.6* 7.7* 8.2*      Phosphorus:     Recent Labs     05/24/19  0351 05/25/19  0441 05/26/19  0530   PHOS 2.2* 2.0* 1.7*     Magnesium:    Recent Labs     05/24/19  0351 05/25/19  0441 05/26/19  0530   MG 2.2 1.8 1.6     Albumin:    No results for input(s): LABALBU in the last 72 hours. BNP:      Lab Results   Component Value Date    BNP 43 02/05/2013     IKER:      Lab Results   Component Value Date    IKER NEGATIVE 05/23/2019     SPEP:  Lab Results   Component Value Date    PROT 5.7 05/23/2019    ALBCAL 2.6 05/23/2019    ALBPCT 46 05/23/2019    LABALPH 0.3 05/23/2019    LABALPH 0.9 05/23/2019    A1PCT 5 05/23/2019    A2PCT 16 05/23/2019    LABBETA 0.9 05/23/2019    BETAPCT 17 05/23/2019    GAMGLOB 0.9 05/23/2019    GGPCT 16 05/23/2019    PATH ELECTRONICALLY SIGNED. Ray Damian M.D. 05/23/2019    PATH ELECTRONICALLY SIGNED.  Ray Damian M.D. 05/23/2019     UPEP:   No results found for: LABPE  C3:     Lab Results   Component Value Date    C3 127 05/23/2019     C4:     Lab Results   Component Value Date    C4 27 05/23/2019     MPO ANCA:   No results found for: MPO  PR3 ANCA:   No results found for: PR3  Anti-GBM:   No results found for: GBMABIGG  Hep BsAg:         Lab Results   Component Value Date    HEPBSAG NONREACTIVE 03/29/2016     Hep C AB:          Lab Results   Component Value Date    HEPCAB NONREACTIVE 03/29/2016       Urinalysis/Chemistries:      Lab Results   Component Value Date    NITRU NEGATIVE 05/23/2019    COLORU YELLOW 05/23/2019    PHUR 5.0 05/23/2019    WBCUA None 05/23/2019    RBCUA 5 TO 10 05/23/2019    MUCUS NOT REPORTED 05/23/2019    TRICHOMONAS NOT REPORTED 05/23/2019    YEAST NOT REPORTED 05/23/2019    BACTERIA NOT REPORTED 05/23/2019    SPECGRAV 1.022 05/23/2019    LEUKOCYTESUR NEGATIVE 05/23/2019    UROBILINOGEN Normal 05/23/2019    BILIRUBINUR NEGATIVE 05/23/2019    GLUCOSEU 3+ 05/23/2019    KETUA SMALL 05/23/2019    AMORPHOUS NOT REPORTED 05/23/2019     Urine Sodium:   No results found for: VASHTI  Urine Potassium:  No results found for: KUR  Urine Chloride:  No results found for: CLUR  Urine Osmolarity: No results found for: OSMOU  Urine Protein:   No components found for: TOTALPROTEIN, URINE   Urine Creatinine:     Lab Results   Component Value Date    LABCREA 43.9 05/23/2019     Urine Eosinophils:  No components found for: UEOS    Radiology:     CXR:     Assessment:     1. Acute Kidney Injury: Secondary to ischemic ATN from intravascular volume depletion related to osmotic diuresis from diabetic ketoacidosis, hypotension, low flow, sepsis/systemic inflammation response syndrome, baseline creatinine 1-1.1 was up to 4.5, nonoliguric after Mcintosh was placed. Element of obstructive uropathy a definite possibility. Acute kidney injury stage III, now resolving, creatinine down to normal at 0.98, urine output is excellent  2. Mild hyponatremia   3. 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 Diabetic ketoacidosis likely triggered by sepsis on insulin infusion, resolved  4. Severe anion gap metabolic acidosis secondary to septic ketoacidosis and element of lactic acidosis, resolved  5. Nonhealing right foot ulcer leading to systemic inflammatory response syndrome with antibiotics  6. Upper GI bleed from suspected candida esophagitis on Diflucan Carafate, nothing by mouth hemoglobin dropped down to 6 range being transfused  7. Type 2 diabetes with poor control hemoglobin A1c that-11 range  8. Hyperkalemia, resolved  9. Azotemia from acute kidney injury, improving  10. Hypophosphatemia  11. Hypomagnesemia. Plan:   1. Discontinue maintenance IV fluids  2. Continue with Mcintosh catheter for now   3. Replace magnesium  4.  Antibiotics per infectious disease  5. Replace phosphorus  6. Monitor labs as ordered, including basic metabolic panel and phosphorus and magnesium    Nutrition     Avoid nephrotoxic drugs/contrast exposure. We will continue to follow along with you.        Laila Dahl MD  [facility]

## 2019-05-26 NOTE — PROGRESS NOTES
[facility]  Internal Medicine Residency Program  Inpatient Daily Progress Note  ______________________________________________________________________________    Patient: Alvaro Mcmillan  YOB: 1950   MRN: [id number]    Acct: [de-identified]     Admit date: 5/22/2019  Today's date: 05/26/19  Number of days in the hospital: 3       Admitting Diagnosis: Upper GI bleed    Subjective:   Pt seen and Chart reviewed.       Objective:   Vital Sign:  /72   Pulse 126   Temp 99.2 °F (37.3 °C) (Oral)   Resp 20   Ht 5' 10\" (1.778 m)   Wt 201 lb 15.1 oz (91.6 kg)   SpO2 96%   BMI 28.98 kg/m²       Physical Exam:  General appearance:   alert, well appearing, and in no distress  Mental Status: alert, oriented to person, place, and time  Neurologic:  alert, oriented, normal speech, no focal findings or movement disorder noted  Lungs:  clear to auscultation  Heart[de-identified] normal rate, regular rhythm, normal S1, S2  Abdomen:  soft, nontender, nondistended, no masses or organomegaly  Extremities: peripheral pulses normal, no pedal edema, no clubbing or cyanosis   Skin: normal coloration and turgor, no rashes, no suspicious skin lesions noted      Medications:  Scheduled Medications   magnesium sulfate  2 g Intravenous Once    heparin (porcine)  5,000 Units Subcutaneous 3 times per day    vancomycin  1,250 mg Intravenous Q24H    QUEtiapine  300 mg Oral Nightly    IVPB builder   Intravenous Q8H    insulin glargine  30 Units Subcutaneous BID    insulin lispro  0-12 Units Subcutaneous TID WC    insulin lispro  0-6 Units Subcutaneous Nightly    lidocaine   Topical Once    citalopram  30 mg Oral Daily    atorvastatin  40 mg Oral Daily    sodium chloride flush  10 mL Intravenous 2 times per day    gabapentin  100 mg Oral TID    sodium chloride  250 mL Intravenous Once    fluconazole  100 mg Intravenous Q24H    sucralfate  1 g Oral 4 times per day       PRN Medications  sodium chloride flush 10 mL PRN   magnesium hydroxide 30 mL Daily PRN   ondansetron 4 mg Q6H PRN   senna 1 tablet Daily PRN   acetaminophen 650 mg Q4H PRN   glucose 15 g PRN   dextrose 12.5 g PRN   glucagon (rDNA) 1 mg PRN   dextrose 100 mL/hr PRN       Diagnostic Labs and Imaging:  CBC:  Recent Labs     05/24/19  0351  05/25/19  0315 05/25/19  0441 05/26/19  0530   WBC 9.0  --   --  8.9  --    HGB 7.7*   < > 7.1* 7.1* 7.6*     --   --  181  --     < > = values in this interval not displayed. BMP: Recent Labs     05/25/19  0033 05/25/19  0441 05/26/19  0530    134* 132*   K 3.7 4.1 3.9   CL 97* 96* 97*   CO2 30 27 24   BUN 35* 31* 17   CREATININE 1.18 1.25* 0.98   GLUCOSE 271* 229* 179*     Hepatic: No results for input(s): AST, ALT, ALB, BILITOT, ALKPHOS in the last 72 hours. Assessment and Plan:   · IDDM  on 30 U Lantus and sliding scale. · Acute blood loss anemia , EGD showed severe esophagitis on IV PPI BID and carfarate Hb stable at 7.6. On Diflucan. H N H Q12  · LAMBERTO resolved stop IV fluids, nephrology following. · Left leg wound managed by podiatry. F/U MRI left foot. · Abx per ID. On Vancomycin and Zosyn. · Carb control diet. · Continue home medications Celexa, Gabapentin, Lipitor. · PT OT SW consulted eval pending.       Attending Physician Statement  I have discussed the case, including pertinent history and exam findings with the resident and the team.  I have seen and examined the patient and the key elements of the encounter have been performed by me. I agree with the assessment, plan and orders as documented by the resident.       Gt Vilchis MD, MARKY, Chon Chacon  Attending Physician, Internal Medicine Service    Internal Medicine Residency Program  5/26/2019, 3:47 PM

## 2019-05-26 NOTE — PROGRESS NOTES
Progress Note  Podiatric Medicine and Surgery     Subjective     CC: Left foot pain and ulcer    Patient seen and examined at bedside. Restraints in place  Afebrile, tachycardic  Transferred out of ICU  Pain controlled      HPI :  Fouzia Crawford is a 76 y.o. male seen at [facility] ED for evaluation of left foot ulcer. The patient has been worked up and labs and vitals indicated that he is Septic, in DKA, and may have possible GI bleed. He reports weakness for many weeks which is why he was unable to follow up for his heel. He states that he was unable to get up and therefore his daughter requested that he go to the ER. He states he was performing dressing changes as instructed. He was seeing Dr. Paresh Catsaneda however states he would like to see a new podiatrist at this time. Plan is for admission to ICU. Patient crossed for 4 units. ROS: Denies N/V/F/C/SOB/CP. Otherwise negative except at stated in the HPI.      Medications:  Scheduled Meds:   sodium phosphate IVPB  15 mmol Intravenous Once    heparin (porcine)  5,000 Units Subcutaneous 3 times per day    vancomycin  1,250 mg Intravenous Q24H    QUEtiapine  300 mg Oral Nightly    IVPB builder   Intravenous Q8H    insulin glargine  30 Units Subcutaneous BID    insulin lispro  0-12 Units Subcutaneous TID WC    insulin lispro  0-6 Units Subcutaneous Nightly    lidocaine   Topical Once    citalopram  30 mg Oral Daily    atorvastatin  40 mg Oral Daily    sodium chloride flush  10 mL Intravenous 2 times per day    gabapentin  100 mg Oral TID    sodium chloride  250 mL Intravenous Once    fluconazole  100 mg Intravenous Q24H    sucralfate  1 g Oral 4 times per day       Continuous Infusions:   pantoprozole (PROTONIX) infusion 8 mg/hr (05/25/19 0503)    dextrose         PRN Meds:sodium chloride flush, magnesium hydroxide, ondansetron, senna, acetaminophen, glucose, dextrose, glucagon (rDNA), dextrose    Objective     Vitals:  Patient Vitals for the past 8 hrs:   BP Temp Temp src Pulse Resp SpO2   19 0720 139/72 99.2 °F (37.3 °C) Oral 126 20 96 %   19 0433 (!) 150/73 98.6 °F (37 °C) Oral 99 20 99 %     Average, Min, and Max for last 24 hours Vitals:  TEMPERATURE:  Temp  Av.8 °F (37.7 °C)  Min: 98.6 °F (37 °C)  Max: 101.1 °F (38.4 °C)    RESPIRATIONS RANGE: Resp  Av.8  Min: 18  Max: 25    PULSE RANGE: Pulse  Av.5  Min: 52  Max: 126    BLOOD PRESSURE RANGE:  Systolic (85UXN), QQY:193 , Min:94 , IHF:260   ; Diastolic (15NKE), MMS:71, Min:48, Max:73      PULSE OXIMETRY RANGE: SpO2  Av.4 %  Min: 91 %  Max: 99 %    I/O last 3 completed shifts: In: 1320 [P.O.:600; I.V.:20; IV Piggyback:700]  Out: 1400 [Urine:1400]    CBC:  Recent Labs     19  0953  19  0351  19  0315 19  04419  0530   WBC  --   --  9.0  --   --  8.9  --    HGB  --    < > 7.7*   < > 7.1* 7.1* 7.6*   HCT  --    < > 22.9*   < > 21.4* 21.7* 23.8*   PLT  --   --  226  --   --  181  --    .6*  --   --   --   --   --   --     < > = values in this interval not displayed. BMP:  Recent Labs     19  0033 19  04419  0530    134* 132*   K 3.7 4.1 3.9   CL 97* 96* 97*   CO2 30 27 24   BUN 35* 31* 17   CREATININE 1.18 1.25* 0.98   GLUCOSE 271* 229* 179*   CALCIUM 7.6* 7.7* 8.2*        Coags:  Recent Labs     19  1227   PROT 5.7*       Lab Results   Component Value Date    SEDRATE >130 (H) 2019     Recent Labs     19  0953   .6*       Lower Extremity Physical Exam:  Vascular: DP and PT pulses are non palpable. CFT <5 seconds to all digits. Hair growth is absent to the level of the digits. Mild edema to left heel.       Neuro: Saph/sural/SP/DP/plantar sensation absent to light touch.     Musculoskeletal: Muscle strength is 4/5 to all lower extremity muscle groups. Gross deformity is present with cavus foot structure and hammer digits 2-5. +POP to the left medial heel.    Dermatologic: Full thickness ulcer #1 located medial left heel and measures approximately 6.0cm x 6.0cm x 0.5cm. boggy eschar superficially. Base is 90% necrotic 10% fibrotic. Periwound skin is macerated. 1 cc seropurulent drainage noted with associated mal odor. Mild erythema present to periwound with associated increase in warmth. Does not probe to bone, sinus track, or undermine. No fluctuance, crepitus, or induration. Interdigital maceration absent.      HPK to distal tuft of R 3rd digit. Imaging:   US RENAL COMPLETE   Final Result   *Right renal cyst of otherwise negative renal ultrasound. *Limited assessment of the bladder with Mcintosh catheter in place without   evidence of gross abnormality. XR TIBIA FIBULA LEFT (2 VIEWS)   Final Result   1. Unremarkable radiographs of the left tibia and fibula. XR FOOT LEFT (MIN 3 VIEWS)   Final Result   1. Soft tissue ulceration along the plantar aspect of the heel. No evidence   of osteomyelitis. Underlying infection cannot be excluded. 2. No fracture. XR CHEST PORTABLE   Final Result   1. Marked low lung volumes with bibasilar atelectasis. MRI FOOT LEFT W WO CONTRAST    (Results Pending)       Assessment   Mhedi Abdi is a 76 y.o. male with   1. Sepsis  2. wet gangrene of left medial heel   3. S/p EGD (DOS 5/23/19)  4. Diabetic ketoacidosis  5. Acute renal failure  6. Hyperkeratosis of R 3rd digit  7. Uncontrolled diabetes mellitus with peripheral neuropathy     Principal Problem:    Upper GI bleed  Active Problems:    Essential hypertension    DM type 2, uncontrolled, with neuropathy (HCC)    Hyperkalemia    Anemia    Type 2 diabetes mellitus with left diabetic foot infection (HCC)    Candida esophagitis (HCC)    Bandemia    Fever chills    Sepsis associated hypotension (HCC)  Resolved Problems:    * No resolved hospital problems.  *       Plan     · Patient examined and evaluated at bedside   · Treatment options discussed in detail with the patient  · ICU - medical management   · Gastroenterology following   · Neph following  · ID - vanc/zosyn  · PVR reviewed - JANNETTE 1.2 R 0.96 L; moderate occlusive disease L w/ exercise  · Vasc consulted-  No surgical intervention at this time. · Cx - MRSA, e.coli   · XR of Right foot and Tib/Fib reviewed - no evidence of soft tissue emphysema, areas of soft tissue defects corresponding with wound noted to posterior heel. · L foot MRI ordered to assess osseus involvement, pending   · Will need formal debridement in OR, awaiting MRI results. · Dressing changed to left lower extremity: betadine wtd dsd, light ace  · Will discuss with Dr. Elijah Saunders DPM   Podiatric Medicine & Surgery   5/26/2019 at 8:59 AM      I performed a history and physical examination of the patient and discussed management with the resident. I reviewed the residents note and agree with the documented findings and plan of care. Any areas of disagreement are noted on the chart. I was personally present for the key portions of any procedures. I have documented in the chart those procedures where I was not present during the key portions. I have reviewed the Podiatry Resident progress note. I agree with the chief complaint, past medical history, past surgical history, allergies, medications, social and family history as documented unless otherwise noted below. Documentation of the HPI, Physical Exam and Medical Decision Making performed by medical students or scribes is based on my personal performance of the HPI, PE and MDM. I have personally evaluated this patient and have completed at least one if not all key elements of the E/M (history, physical exam, and MDM). Additional findings are as noted.      Lenard Esquivel DPM on 5/28/2019 at 34:97 AM  Board Certified, American Board of Podiatric Surgery  Fellow, [employer]

## 2019-05-26 NOTE — CARE COORDINATION
Transitional Planning  Per AM report, plan for debridement in OR with Podiatry. Nephro and ID also following. Patient on zosyn, vanco, and fluconazole. PT/OT ordered yesterday. Plan for home with [facility] vs SNF.

## 2019-05-26 NOTE — PROGRESS NOTES
[facility]    Gastroenterology Daily Progress Note      Patient:   Dov Sanchez   :    1950   Facility:   [facility]   Date:     2019  Consultant:   Grey Hughes CNP      Subjective:     76 y.o. male admitted 2019 with Upper GI bleed [K92.2] and seen for coffee ground emesis, anemia. Patient seen and examined. Sleepy this AM. Ate solid consistency breakfast this AM.  No N/V. He denies any epigastric pain or dysphagia            Objective     Scheduled Meds:   sodium phosphate IVPB  15 mmol Intravenous Once    heparin (porcine)  5,000 Units Subcutaneous 3 times per day    vancomycin  1,250 mg Intravenous Q24H    QUEtiapine  300 mg Oral Nightly    IVPB builder   Intravenous Q8H    insulin glargine  30 Units Subcutaneous BID    insulin lispro  0-12 Units Subcutaneous TID WC    insulin lispro  0-6 Units Subcutaneous Nightly    lidocaine   Topical Once    citalopram  30 mg Oral Daily    atorvastatin  40 mg Oral Daily    sodium chloride flush  10 mL Intravenous 2 times per day    gabapentin  100 mg Oral TID    sodium chloride  250 mL Intravenous Once    fluconazole  100 mg Intravenous Q24H    sucralfate  1 g Oral 4 times per day       Vital Signs:  /72   Pulse 126   Temp 99.2 °F (37.3 °C) (Oral)   Resp 20   Ht 5' 10\" (1.778 m)   Wt 201 lb 15.1 oz (91.6 kg)   SpO2 96%   BMI 28.98 kg/m²      Physical Exam:   General:  Sleep, but awakens after stimuli, NAD  Chest:   Bilateral vesicular breath sounds, no rales or wheezes. Cardiac:  S1 S2 irreg, irreg, no murmurs or gallops  Abdomen: Soft, non-tender, non distended, BS audible. SKIN:  No rashes, good skin turgor. No jaundice  Extremities:  No edema, no clubbing, No cyanosis  Neuro:  Alert, oriented, No FND.      Lab and Imaging Review     CBC  Recent Labs     19  0351  19  0315 19  0441 19  0530   WBC 9.0  --   --  8.9  --    HGB 7.7*   < > 7.1* Candida and reflux esophagitis  - Denies dysypagia or odynophagia with diet      Plan:     1. Continue PPI gtt and Carafate 1 gm four times daily  2. DM diet  3. Continue Diflucan   4. Trend Hgb  5. Serial abdominal examinations - observe for perforation or complications   6. GI will sign off. Will need out patient EGD in 4-6 weeks to assess for healing   7. Please call GI with any questions, concerns or acute change in clinical status       This plan was formulated in collaboration with Dr. Jeremi Spicer    Electronically signed by Mika Short CNP on 5/26/2019 at 8:19 AM    Please note that this note was generated using a voice recognition dictation software. Although every effort was made to ensure the accuracy of this automated transcription, some errors in transcription may have occurred.

## 2019-05-27 NOTE — PROGRESS NOTES
Occupational Therapy    Occupational Therapy Not Seen Note    DATE: 2019  Name: Lexie Ho  : 1950  MRN: [id number]    Patient not available for Occupational Therapy due to: Other: Pt vomited prior to writer arrival, RN aware. Next Scheduled Treatment: Attempt on  as appropriate.     Electronically signed by Tia Osuna OT on 2019 at 3:04 PM

## 2019-05-27 NOTE — PROGRESS NOTES
Progress Note  Podiatric Medicine and Surgery     Subjective     CC: Left foot pain and ulcer    Patient seen and examined at bedside. Afebrile, tachycardic  Some throbbing pain to L heel  Denies n/f/c/v/sob/cp  Notes diarrhea and was found with bed soiled with fecal matter to dressing, pt notes unable to find call button to ask for cleaning      HPI :  Duke Mukherjee is a 76 y.o. male seen at [facility] ED for evaluation of left foot ulcer. The patient has been worked up and labs and vitals indicated that he is Septic, in DKA, and may have possible GI bleed. He reports weakness for many weeks which is why he was unable to follow up for his heel. He states that he was unable to get up and therefore his daughter requested that he go to the ER. He states he was performing dressing changes as instructed. He was seeing Dr. Thomas King however states he would like to see a new podiatrist at this time. Plan is for admission to ICU. Patient crossed for 4 units. ROS: Denies N/V/F/C/SOB/CP. Otherwise negative except at stated in the HPI.      Medications:  Scheduled Meds:   calcium gluconate IVPB  2 g Intravenous Once    insulin glargine  35 Units Subcutaneous BID    pantoprazole  40 mg Intravenous BID    And    sodium chloride (PF)  10 mL Intravenous BID    vancomycin  1,000 mg Intravenous Q12H    heparin (porcine)  5,000 Units Subcutaneous 3 times per day    QUEtiapine  300 mg Oral Nightly    IVPB builder   Intravenous Q8H    insulin lispro  0-12 Units Subcutaneous TID WC    insulin lispro  0-6 Units Subcutaneous Nightly    lidocaine   Topical Once    citalopram  30 mg Oral Daily    atorvastatin  40 mg Oral Daily    sodium chloride flush  10 mL Intravenous 2 times per day    gabapentin  100 mg Oral TID    sodium chloride  250 mL Intravenous Once    fluconazole  100 mg Intravenous Q24H    sucralfate  1 g Oral 4 times per day       Continuous Infusions:   dextrose         PRN Meds:sodium phosphate IVPB **OR** sodium phosphate IVPB, sodium chloride flush, magnesium hydroxide, ondansetron, senna, acetaminophen, glucose, dextrose, glucagon (rDNA), dextrose    Objective     Vitals:  Patient Vitals for the past 8 hrs:   BP Temp Temp src Pulse Resp SpO2 Weight   19 0738 (!) 141/80 99 °F (37.2 °C) Axillary 111 20 96 % --   19 0558 -- -- -- -- -- -- 202 lb 13.2 oz (92 kg)   19 05 137/65 98.3 °F (36.8 °C) Oral 101 21 97 % --     Average, Min, and Max for last 24 hours Vitals:  TEMPERATURE:  Temp  Av.3 °F (36.8 °C)  Min: 97.3 °F (36.3 °C)  Max: 99 °F (37.2 °C)    RESPIRATIONS RANGE: Resp  Av.3  Min: 20  Max: 30    PULSE RANGE: Pulse  Av.5  Min: 101  Max: 111    BLOOD PRESSURE RANGE:  Systolic (40WKO), NLM:903 , Min:102 , BUFFY:301   ; Diastolic (42WIH), IYB:84, Min:52, Max:80      PULSE OXIMETRY RANGE: SpO2  Av.5 %  Min: 96 %  Max: 97 %    I/O last 3 completed shifts:  In: -   Out: 2750 [Urine:2750]    CBC:  Recent Labs     19  1940 19  0641   WBC 8.9  --   --   --    HGB 7.1* 7.6* 7.2* 7.5*   HCT 21.7* 23.8* 23.1* 23.5*     --   --   --         BMP:  Recent Labs     19  0519  0641   * 132* 135   K 4.1 3.9 4.2   CL 96* 97* 100   CO2 27 24 25   BUN 31* 17 16   CREATININE 1.25* 0.98 1.06   GLUCOSE 229* 179* 224*   CALCIUM 7.7* 8.2* 8.2*        Coags:  No results for input(s): APTT, PROT, INR in the last 72 hours. Lab Results   Component Value Date    SEDRATE >130 (H) 2019     No results for input(s): CRP in the last 72 hours. Lower Extremity Physical Exam:  Vascular: DP and PT pulses are non palpable. CFT <5 seconds to all digits. Hair growth is absent to the level of the digits. Mild edema to left heel.       Neuro: Saph/sural/SP/DP/plantar sensation absent to light touch.     Musculoskeletal: Muscle strength is 4/5 to all lower extremity muscle groups.  Gross deformity is hospital problems. *       Plan     · Patient examined and evaluated at bedside   · Treatment options discussed in detail with the patient  · IM - medical management   · Neph following  · ID - vanc/zosyn  · PVR reviewed - JANNETTE 1.2 R 0.96 L; moderate occlusive disease L w/ exercise  · Vasc consulted-  No surgical intervention at this time. · Cx - MRSA, e.coli   · XR of Right foot and Tib/Fib reviewed - no evidence of soft tissue emphysema, areas of soft tissue defects corresponding with wound noted to posterior heel. · Cleaned L heel using betadine  · L foot MRI to assess osseus involvement, pending   · Will need formal debridement in OR, awaiting MRI results. · Dressing changed to left lower extremity: betadine wtd dsd, light ace  · Will discuss with Dr. Purnima Flower, [facility]   Podiatric Medicine & Surgery   5/27/2019 at 11:01 AM      I performed a history and physical examination of the patient and discussed management with the resident. I reviewed the residents note and agree with the documented findings and plan of care. Any areas of disagreement are noted on the chart. I was personally present for the key portions of any procedures. I have documented in the chart those procedures where I was not present during the key portions. I have reviewed the Podiatry Resident progress note. I agree with the chief complaint, past medical history, past surgical history, allergies, medications, social and family history as documented unless otherwise noted below. Documentation of the HPI, Physical Exam and Medical Decision Making performed by medical students or scribes is based on my personal performance of the HPI, PE and MDM. I have personally evaluated this patient and have completed at least one if not all key elements of the E/M (history, physical exam, and MDM). Additional findings are as noted.      Wil Zaidi DPM on 5/28/2019 at 03:82 AM  Board Certified, American Board of Podiatric Surgery  Fellow, [employer] Foot [employer]

## 2019-05-27 NOTE — PLAN OF CARE
Pt free from skin break down, q2 turn and reposition, also skin dry and linen dry, and lotion apply to the skin, and continue to monitor the pt.

## 2019-05-27 NOTE — PROGRESS NOTES
5,000 Units Subcutaneous 3 times per day    QUEtiapine  300 mg Oral Nightly    IVPB builder   Intravenous Q8H    insulin lispro  0-12 Units Subcutaneous TID WC    insulin lispro  0-6 Units Subcutaneous Nightly    lidocaine   Topical Once    citalopram  30 mg Oral Daily    atorvastatin  40 mg Oral Daily    sodium chloride flush  10 mL Intravenous 2 times per day    gabapentin  100 mg Oral TID    sodium chloride  250 mL Intravenous Once    fluconazole  100 mg Intravenous Q24H    sucralfate  1 g Oral 4 times per day       PRN Medications    sodium phosphate IVPB 0.16 mmol/kg PRN   Or     sodium phosphate IVPB 0.32 mmol/kg PRN   sodium chloride flush 10 mL PRN   magnesium hydroxide 30 mL Daily PRN   ondansetron 4 mg Q6H PRN   senna 1 tablet Daily PRN   acetaminophen 650 mg Q4H PRN   glucose 15 g PRN   dextrose 12.5 g PRN   glucagon (rDNA) 1 mg PRN   dextrose 100 mL/hr PRN       Diagnostic Labs and Imaging:  CBC:  Recent Labs     05/25/19  0441 05/26/19  0530 05/26/19  1940 05/27/19  0641   WBC 8.9  --   --   --    HGB 7.1* 7.6* 7.2* 7.5*     --   --   --      BMP:   Recent Labs     05/25/19 0441 05/26/19  0530 05/27/19  0641   * 132* 135   K 4.1 3.9 4.2   CL 96* 97* 100   CO2 27 24 25   BUN 31* 17 16   CREATININE 1.25* 0.98 1.06   GLUCOSE 229* 179* 224*     Hepatic: No results for input(s): AST, ALT, ALB, BILITOT, ALKPHOS in the last 72 hours. Assessment and Plan:   · IDDM- Increased Lantus to 35 units BID. Insulin sliding scale . · Acute blood loss anemia , EGD showed severe esophagitis on IV PPI BID and carfarate Hb stable at 7.6. On Diflucan. H N H Q12  · LAMBERTO resolved stop IV fluids, nephrology following. · Left leg wound managed by podiatry. F/U MRI left foot. Abx per ID. On Vancomycin and Zosyn. · Carb control diet. · Continue home medications Celexa, Gabapentin, Lipitor.    · PT OT SW consulted eval pending.       Maru Wilson MD  Internal Medicine Resident, PGY-1  [facility]. [facility]; [place], [place]  5/27/2019, 9:28 AM      Attending Physician Statement  I have discussed the case, including pertinent history and exam findings with the resident and the team.  I have seen and examined the patient and the key elements of the encounter have been performed by me. I agree with the assessment, plan and orders as documented by the resident.       Gt Miller MD, MARKY, Ameya Jackson  Attending Physician, Internal Medicine Service    Internal Medicine Residency Program  5/27/2019, 10:35 AM

## 2019-05-27 NOTE — CONSULTS
[facility] -   Infectious diseases evaluation  admission date 5/22/2019    reason for consultation:   Left leg wound    Impression :   Current:  · DKA - DM  · Sepsis - shock -- resolved  · Leukocytosis  · Left heel wet gangrene  · severe candida esophagitis vs reflux esophagitis - UGI bleed, EGD 5/23/19  - friable - no biopsy  · CRP elevation 150  · ARF    Other:  ·   Discussion / summary of stay / plan of care   ·   Recommendations   · on zosyn,  Fluconazole,  vanco  · pharm to dose- follow creat- aim trough 14-17  · Betadine to the heel wound to dry the gangrene  · Pend MRI left foot to decide next surg step  · adjust antibiotics per final cx  · Will follow - disc w pt and RN    Infection Control Recommendations   · [place] Precautions  · Contact Isolation     Antimicrobial Stewardship Recommendations   · Simplification of therapy  · Targeted therapy  · Per Kg dosing      Coordination ofOutpatient Care:   · Estimated Length of IV antimicrobials:  · Patient will need Midline / picc Catheter Insertion:   · Patient will need SNF:  · Patient will need outpatient wound care:     History of Present Illness:   Initial history:  David Granda is a 76y.o.-year-old male presented to ER 5/23 w coffee grund emesis - found hypotensive and in DKA, left ankle wet gangrene. Lactic acidosis 3.7, ARF, hyperkalemia, anemia and received blood. Has had the left heel ulcer and was not attending it - now wet gangrene and redness around it. Leukocytosis  27  vasc and podiatry are on the case. We are called for the left heel necrosis and infection. WBC now is better at 8.9  Creat 0.25    Pt now able to swallow better - left ankle seen and still foul w soft necrotic ankle tissue. Interval changes  5/26/2019   No fever or chills, dysphagia, improved,  Healing necrotic, pending MRI. Labs within normal range. Culture from the foot wound showing MRSA. BMP:  Recent Labs     05/25/19  0441 05/26/19  0530   * 132*   K 4.1 3.9   CL 96* 97*   CO2 27 24   BUN 31* 17   CREATININE 1.25* 0.98   MG 1.8 1.6     Hepatic Function Panel:   No results for input(s): PROT, LABALBU, BILIDIR, IBILI, BILITOT, ALKPHOS, ALT, AST in the last 72 hours. No results for input(s): RPR in the last 72 hours. No results for input(s): HIV in the last 72 hours. No results for input(s): BC in the last 72 hours. Lab Results   Component Value Date    CREATININE 0.98 05/26/2019    GLUCOSE 179 05/26/2019       Detailed results: Thank you for allowing us to participate in the care of this patient. Please call with questions. This note is created with the assistance of a speech recognition program.  While intending to generate adocument that actually reflects the content of the visit, the document can still have some errors including those of syntax and sound a like substitutions which may escape proof reading. It such instances, actual meaningcan be extrapolated by contextual diversion.     Aury Bone MD  Office: [phone]  Perfect serve / office [phone]

## 2019-05-27 NOTE — PLAN OF CARE
Pt free from fall, bed in  lower position and call light within reach, bedside table within reach, adequate light in the room and continue to monitor the pt.

## 2019-05-27 NOTE — PROGRESS NOTES
Renal Progress Note    Patient :  David Granda; 76 y.o. MRN# [id number]  Location:  8260/9816-20  Attending:  Stuart Moody MD  Admit Date:  5/22/2019   Hospital Day: 4      Subjective:     Patient was seen and examined. No new issues overnight. Made about 2.7 L of urine lasted for hours  Serum creatinine improved to 1.06 MG/DL today. Electrolytes stabilized. Sodium 135, potassium 4.2, bicarb 25. Blood pressure okay. Outpatient Medications:     Medications Prior to Admission: Soft Lens Products (STERILE SALINE) SOLN, Apply to wound. Cleanse wound than apply as a wet to dry dressing. Gauze Pads & Dressings (GAUZE DRESSING) 4\"X4\" PADS, Apply to wound. Gauze Bandages (ROLLED GAUZE BANDAGE 4\"X2. 5YD) MISC, Apply to wound. collagenase (SANTYL) 250 UNIT/GM ointment, Apply topically daily. Gauze Pads & Dressings (GAUZE DRESSING) 4\"X4\" PADS, Apply to wound. Gauze Bandages (ROLLED GAUZE BANDAGE 4\"X2. 5YD) MISC, Apply to wound. Soft Lens Products (SALINE) 0.9 % SOLN, Apply 1 Bottle topically 2 times daily Apply a wet to dry twice a day. Can also use to cleanse area prior to dressing  blood glucose monitor strips, Check blood sugars daily as directed. atorvastatin (LIPITOR) 40 MG tablet, Take 1 tablet by mouth daily  gabapentin (NEURONTIN) 300 MG capsule, Take 1 capsule by mouth 3 times daily for 180 days. Intended supply: 30 days  Wound Dressings (FIBRACOL) MISC, Apply to wound.   Continuous Blood Gluc  (FREESTYLE SOFI 14 DAY READER) DONG, Use daily  Continuous Blood Gluc Sensor (FREESTYLE SOFI 14 DAY SENSOR) MIS, Use daily  lisinopril (PRINIVIL;ZESTRIL) 5 MG tablet, Take 1 tablet by mouth daily  diclofenac (VOLTAREN) 75 MG EC tablet, Take 1 tablet by mouth 2 times daily (with meals)  insulin glargine (TOUJEO SOLOSTAR) 300 UNIT/ML injection pen, Inject 50 Units into the skin nightly  metFORMIN (GLUCOPHAGE) 1000 MG tablet, Take 1 tablet by mouth 2 times daily (with meals)  citalopram (CELEXA) 20 MG tablet, Take 30 mg by mouth daily   QUEtiapine (SEROQUEL) 300 MG tablet, Take 300 mg by mouth daily     Current Medications:     Scheduled Meds:    insulin glargine  35 Units Subcutaneous BID    vitamin D3  800 Units Oral Daily    sodium phosphate IVPB  10 mmol Intravenous Once    insulin lispro  0-18 Units Subcutaneous TID WC    insulin lispro  0-9 Units Subcutaneous Nightly    pantoprazole  40 mg Intravenous BID    And    sodium chloride (PF)  10 mL Intravenous BID    vancomycin  1,000 mg Intravenous Q12H    heparin (porcine)  5,000 Units Subcutaneous 3 times per day    QUEtiapine  300 mg Oral Nightly    IVPB builder   Intravenous Q8H    lidocaine   Topical Once    citalopram  30 mg Oral Daily    atorvastatin  40 mg Oral Daily    sodium chloride flush  10 mL Intravenous 2 times per day    gabapentin  100 mg Oral TID    sodium chloride  250 mL Intravenous Once    fluconazole  100 mg Intravenous Q24H    sucralfate  1 g Oral 4 times per day     Continuous Infusions:    dextrose       PRN Meds:  sodium phosphate IVPB **OR** sodium phosphate IVPB, labetalol, oxyCODONE, sodium chloride flush, magnesium hydroxide, ondansetron, senna, acetaminophen, glucose, dextrose, glucagon (rDNA), dextrose    Input/Output:       I/O last 3 completed shifts:  In: -   Out: 3650 [Urine:3650].       Patient Vitals for the past 96 hrs (Last 3 readings):   Weight   19 0558 202 lb 13.2 oz (92 kg)   19 0400 201 lb 15.1 oz (91.6 kg)   19 0412 197 lb 12 oz (89.7 kg)       Vital Signs:   Temperature:  Temp: 98.3 °F (36.8 °C)  TMax:   Temp (24hrs), Av.5 °F (36.9 °C), Min:98.1 °F (36.7 °C), Max:99 °F (37.2 °C)    Respirations:  Resp: 19  Pulse:   Pulse: 106  BP:    BP: (!) 174/141  BP Range: Systolic (33TDO), CZW:927 , Min:126 , G       Diastolic (72LKL), ZKX:36, Min:58, Max:141      Physical Examination:     General:  AAO x 3, speaking in full sentences, no accessory muscle use.  HEENT: Atraumatic, normocephalic, no throat congestion, moist mucosa. Eyes:   Pupils equal, round and reactive to light, EOMI. Neck:   Supple  Chest:   Bilateral vesicular breath sounds, no rales or wheezes. Cardiac:  S1 S2 RR, no murmurs, gallops or rubs  Abdomen: Soft, non-tender, no masses or organomegaly, BS audible. :   No suprapubic or flank tenderness. Neuro:  AAO x 3, No FND. SKIN:  No rashes, good skin turgor. Extremities:  No edema, positive ulceration left foot    Labs:       Recent Labs     05/25/19 0441 05/26/19 0530 05/26/19 1940 05/27/19 0641   WBC 8.9  --   --   --    RBC 2.46*  --   --   --    HGB 7.1* 7.6* 7.2* 7.5*   HCT 21.7* 23.8* 23.1* 23.5*   MCV 88.2  --   --   --    MCH 28.9  --   --   --    MCHC 32.7  --   --   --    RDW 13.5  --   --   --      --   --   --    MPV 9.8  --   --   --       BMP:   Recent Labs     05/25/19 0441 05/26/19 0530 05/27/19 0641   * 132* 135   K 4.1 3.9 4.2   CL 96* 97* 100   CO2 27 24 25   BUN 31* 17 16   CREATININE 1.25* 0.98 1.06   GLUCOSE 229* 179* 224*   CALCIUM 7.7* 8.2* 8.2*      Phosphorus:     Recent Labs     05/25/19 0441 05/26/19 0530 05/27/19 0641   PHOS 2.0* 1.7* 2.3*     Magnesium:    Recent Labs     05/25/19 0441 05/26/19 0530 05/27/19 0641   MG 1.8 1.6 1.8     BNP:      Lab Results   Component Value Date    BNP 43 02/05/2013     IKER:      Lab Results   Component Value Date    IKER NEGATIVE 05/23/2019     SPEP:  Lab Results   Component Value Date    PROT 5.7 05/23/2019    ALBCAL 2.6 05/23/2019    ALBPCT 46 05/23/2019    LABALPH 0.3 05/23/2019    LABALPH 0.9 05/23/2019    A1PCT 5 05/23/2019    A2PCT 16 05/23/2019    LABBETA 0.9 05/23/2019    BETAPCT 17 05/23/2019    GAMGLOB 0.9 05/23/2019    GGPCT 16 05/23/2019    PATH ELECTRONICALLY SIGNED. Shanta Mason M.D. 05/23/2019    PATH ELECTRONICALLY SIGNED.  Shanta Mason M.D. 05/23/2019     C3:     Lab Results   Component Value Date    C3 127 05/23/2019 C4:     Lab Results   Component Value Date    C4 27 05/23/2019     Hep BsAg:         Lab Results   Component Value Date    HEPBSAG NONREACTIVE 03/29/2016     Hep C AB:          Lab Results   Component Value Date    HEPCAB NONREACTIVE 03/29/2016       Urinalysis/Chemistries:      Lab Results   Component Value Date    NITRU NEGATIVE 05/23/2019    COLORU YELLOW 05/23/2019    PHUR 5.0 05/23/2019    WBCUA None 05/23/2019    RBCUA 5 TO 10 05/23/2019    MUCUS NOT REPORTED 05/23/2019    TRICHOMONAS NOT REPORTED 05/23/2019    YEAST NOT REPORTED 05/23/2019    BACTERIA NOT REPORTED 05/23/2019    SPECGRAV 1.022 05/23/2019    LEUKOCYTESUR NEGATIVE 05/23/2019    UROBILINOGEN Normal 05/23/2019    BILIRUBINUR NEGATIVE 05/23/2019    GLUCOSEU 3+ 05/23/2019    KETUA SMALL 05/23/2019    AMORPHOUS NOT REPORTED 05/23/2019      Urine Creatinine:     Lab Results   Component Value Date    LABCREA 43.9 05/23/2019     Radiology:     Reviewed. Assessment:     1. Acute Kidney Injury: Secondary to ischemic ATN from intravascular volume depletion related to osmotic diuresis from diabetic ketoacidosis, hypotension, low flow, sepsis/systemic inflammation response syndrome, baseline creatinine 1-1.1 was up to 4.5, nonoliguric after Mcintosh was placed.  Element of obstructive uropathy a definite possibility.  Acute kidney injury stage III, now resolving, creatinine down to normal at 1.06 mg/dl, urine output is excellent  2. Mild hyponatremia - resolved   3. 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  Diabetic ketoacidosis likely triggered by sepsis on insulin infusion, resolved  4.  Severe anion gap metabolic acidosis secondary to septic ketoacidosis and element of lactic acidosis, resolved  5.  Nonhealing right foot ulcer leading to systemic inflammatory response syndrome with antibiotics  6.  Upper GI bleed from suspected candida esophagitis on Diflucan Carafate, nothing by mouth hemoglobin dropped down to 6 range being transfused  7.  Type 2 diabetes with poor control hemoglobin A1c that-11 range  8.   Hyperkalemia, resolved  9.  Azotemia from acute kidney injury, improving  10. Hypophosphatemia- Improved  11. Hypomagnesemia. - Improved      Plan:   1. Antibiotics per renal function. 2.  Continue current treatment. 3.  Since the renal function is improved and urine output good nephrology will sign off. Please call with any other questions. Nutrition   Please ensure that patient is on a renal diet/TF. Avoid nephrotoxic drugs/contrast exposure. We will continue to follow along with you. Donn Borjas MD  [facility] [place]     This note is created with the assistance of a speech-recognition program. While intending to generate a document that actually reflects the content of the visit, no guarantees can be provided that every mistake has been identified and corrected by editing.

## 2019-05-28 NOTE — PROGRESS NOTES
Nutrition Assessment    Type and Reason for Visit: Reassess    Nutrition Recommendations:   - Suggest modifying current diet to provide 4 Carb Choices at each meal along with continue ELIZABETH diet. - Will continue to provide Ensure Clear Liquid ONS with meals.   - Encourage/monitor intakes as tolerated. Nutrition Assessment: Pt tolerating advanced diet but not consuming much of meals. Observed lunch tray which pt consumed about 25% of his meal and about 75% of his Ensure Clear supplement. Malnutrition Assessment:  · Malnutrition Status: Meets the criteria for moderate malnutrition  · Context: (Acute on Chronic)  · Findings of the 6 clinical characteristics of malnutrition (Minimum of 2 out of 6 clinical characteristics is required to make the diagnosis of moderate or severe Protein Calorie Malnutrition based on AND/ASPEN Guidelines):  1. Energy Intake-Less than or equal to 75% of estimated energy requirement, Greater than or equal to 5 days    2. Weight Loss-5% loss or greater, (in 2 months)  3. Fat Loss-No significant subcutaneous fat loss  4. Muscle Loss-Mild muscle mass loss, Temples (temporalis muscle)  5. Fluid Accumulation-Mild fluid accumulation, Generalized    Nutrition Risk Level:  Moderate    Nutrient Needs:  · Estimated Daily Total Kcal: 2184-9824 kcal/day  · Estimated Daily Protein (g): 75-90 gm pro/day    Nutrition Diagnosis:   · Problem: Inadequate oral intake  · Etiology: related to Appetite, Current Condition     Signs and symptoms:  as evidenced by Intake 0-25%, Diet history of poor intake, need for oral supplements    Objective Information:  · Wound Type: Diabetic Ulcer(to left heel)  · Current Nutrition Therapies:  · Oral Diet Orders: Carb Control 3 Carbs/Meal, No Added Salt (3-4gm)   · Oral Diet intake: 1-25%  · Oral Nutrition Supplement (ONS) Orders: Clear Liquid Oral Supplement  · ONS intake: 51-75%, %  · Anthropometric Measures:  · Ht: 5' 10\" (177.8 cm)   · Current Body Wt: 210 lb 1.6 oz (95.3 kg)  · Admission Body Wt: 198 lb 6.6 oz (90 kg)  · % Weight Change:  ,  7.9% x 2 months per EHR review  · Ideal Body Wt: 165 lb 12.6 oz (75.2 kg), % Ideal Body 120%  · BMI Classification: BMI 25.0 - 29.9 Overweight    Nutrition Interventions:   Modify current diet, Continue current ONS - Increase to provide 4 carb choices per meal.  Continued Inpatient Monitoring, Education Not Indicated    Nutrition Evaluation:   · Evaluation: Progressing toward goals   · Goals: Meet % of estimated nutrition needs.     · Monitoring: Meal Intake, Supplement Intake, Diet Tolerance, Skin Integrity, Wound Healing, I&O, Weight, Pertinent Labs, Monitor Hemodynamic Status, Monitor Bowel Function    Electronically signed by Malu Cordoba RD, LD on 5/28/19 at 3:00 PM    Contact Number: [phone]

## 2019-05-28 NOTE — PROGRESS NOTES
[facility]  Internal Medicine Residency Program  Inpatient Daily Progress Note  ______________________________________________________________________________    Patient: Avtar Narayan  YOB: 1950   MRN: [id number]    Acct: [de-identified]     Admit date: 5/22/2019  Today's date: 05/28/19  Number of days in the hospital: 5     Admitting Diagnosis: Upper GI bleed    Subjective:   Pt seen and Chart reviewed. Afebrile. Hemodynamically stable. Awaiting MRI foot to evaluate the extensiveness of the infection. Podiatry following: further recommendations after MRI foot. No MRIs over the weekend. Nursing to call today for schedule. ID following. Currently on Vancomycin/Zosn for foot. Diflucan for possible cadidida esophagitis. GI: Severe esophagitis seen on EGD. Possible Candidia. On PPI, Carafate, and Diflucan. Hgb 7.5 today. Lopressor 12.5 started for tachycardia. EKG yesterday with PAC/PVC and blocked PAC creating irregular rhythm. Electrolytes replaced yesterday. TSH WNL. Objective:   Vital Sign:  BP (!) 119/27   Pulse 113   Temp 98.2 °F (36.8 °C) (Oral)   Resp 23   Ht 5' 10\" (1.778 m)   Wt 210 lb 1.6 oz (95.3 kg)   SpO2 98%   BMI 30.15 kg/m²       Physical Exam:  General appearance:   alert, well appearing, and in no distress  Mental Status: alert, oriented to person, place, and time  Neurologic:  alert, oriented, normal speech, no focal findings or movement disorder noted  Lungs:  clear to auscultation  Heart[de-identified] normal rate, regular rhythm, normal S1, S2  Abdomen:  soft, nontender, nondistended, no masses or organomegaly  Extremities: peripheral pulses normal, no pedal edema, no clubbing or cyanosis   Skin: normal coloration and turgor, no rashes, no suspicious skin lesions noted. Ulceration of the left foot.        Medications:  Scheduled Medications   iron sucrose  200 mg Intravenous Q24H    insulin glargine  35 Units Subcutaneous BID    vitamin D3  800 Units Oral Daily    insulin lispro  0-18 Units Subcutaneous TID WC    insulin lispro  0-9 Units Subcutaneous Nightly    metoprolol tartrate  12.5 mg Oral BID    pantoprazole  40 mg Intravenous BID    And    sodium chloride (PF)  10 mL Intravenous BID    vancomycin  1,000 mg Intravenous Q12H    heparin (porcine)  5,000 Units Subcutaneous 3 times per day    QUEtiapine  300 mg Oral Nightly    IVPB builder   Intravenous Q8H    lidocaine   Topical Once    citalopram  30 mg Oral Daily    atorvastatin  40 mg Oral Daily    sodium chloride flush  10 mL Intravenous 2 times per day    gabapentin  100 mg Oral TID    sodium chloride  250 mL Intravenous Once    fluconazole  100 mg Intravenous Q24H    sucralfate  1 g Oral 4 times per day       PRN Medications    sodium phosphate IVPB 0.16 mmol/kg PRN   Or     sodium phosphate IVPB 0.32 mmol/kg PRN   labetalol 10 mg Q6H PRN   oxyCODONE 5 mg Q4H PRN   sodium chloride flush 10 mL PRN   magnesium hydroxide 30 mL Daily PRN   ondansetron 4 mg Q6H PRN   senna 1 tablet Daily PRN   acetaminophen 650 mg Q4H PRN   glucose 15 g PRN   dextrose 12.5 g PRN   glucagon (rDNA) 1 mg PRN   dextrose 100 mL/hr PRN       Diagnostic Labs and Imaging:  CBC:  Recent Labs     05/27/19  0641 05/27/19 2009 05/28/19  0740   HGB 7.5* 7.5* 7.3*     BMP:   Recent Labs     05/26/19  0530 05/27/19  0641 05/28/19  0527   * 135 132*   K 3.9 4.2 4.0   CL 97* 100 99   CO2 24 25 23   BUN 17 16 14   CREATININE 0.98 1.06 1.06   GLUCOSE 179* 224* 164*     Hepatic: No results for input(s): AST, ALT, ALB, BILITOT, ALKPHOS in the last 72 hours. Assessment and Plan:   · IDDM- Increased Lantus to 40 units BID. Insulin sliding scale . · Acute blood loss anemia , EGD showed severe esophagitis on IV PPI BID and carfarate Hb stable at 7.5. On Diflucan. H & H Q12. IV venofer. · LAMBERTO resolved stop IV fluids, nephrology following. Good urine output.    · Left leg wound managed by podiatry. F/U MRI left foot. Abx per ID. On Vancomycin and Zosyn. · Carb control diet. · Continue home medications Celexa, Gabapentin, Lipitor. · PT OT SW consulted eval pending.       Scotty Howard MD  Internal Medicine Resident, PGY-1  [facility]; [place], [place]  5/28/2019, 8:13 AM      Attending Physician Statement  I have discussed the case, including pertinent history and exam findings with the resident and the team.  I have seen and examined the patient and the key elements of the encounter have been performed by me. I agree with the assessment, plan and orders as documented by the resident.       Mbonu Deidra Fernandes MD, MARKY, [address]  Attending Physician, Internal Medicine Service    Internal Medicine Residency Program  5/28/2019, 12:26 PM

## 2019-05-28 NOTE — CONSULTS
[facility] -   Infectious diseases evaluation  admission date 5/22/2019    reason for consultation:   Left leg wound    Impression :   Current:  · DKA - DM  · Sepsis - shock -- resolved  · Leukocytosis  · Left heel wet gangrene - left diabetic foot infection  · L Calcaneal osteomyelitis and abscess  · severe candida esophagitis vs reflux esophagitis - UGI bleed, EGD 5/23/19  - friable - no biopsy  · CRP elevation 150  · ARF    Other:  ·   Discussion / summary of stay / plan of care   ·   Recommendations   · on zosyn,  Days 6  · Fluconazole,  Day 6 of 10 - switch to po 100 mg daily  · vanco iv day 6  · pharm to dose- follow creat- aim trough 14-17  · Betadine to the heel wound to dry the gangrene  · Pend surg plans per podiatry  · Get L:FT add on today   · Will follow - disc w pt and RN    Infection Control Recommendations   · Ethel Precautions  · Contact Isolation     Antimicrobial Stewardship Recommendations   · Simplification of therapy  · Targeted therapy  · Per Kg dosing      Coordination ofOutpatient Care:   · Estimated Length of IV antimicrobials:  · Patient will need Midline / picc Catheter Insertion:   · Patient will need SNF:  · Patient will need outpatient wound care:     History of Present Illness:   Initial history:  Christian Chairez is a 76y.o.-year-old male presented to ER 5/23 w coffee grund emesis - found hypotensive and in DKA, left ankle wet gangrene. Lactic acidosis 3.7, ARF, hyperkalemia, anemia and received blood. Has had the left heel ulcer and was not attending it - now wet gangrene and redness around it. Leukocytosis  27  vasc and podiatry are on the case. We are called for the left heel necrosis and infection. WBC now is better at 8.9  Creat 0.25    Pt now able to swallow better - left ankle seen and still foul w soft necrotic ankle tissue.                  Interval changes  5/28/2019   No fever - no rash - left heel wo smell fluconazole  100 mg Intravenous Q24H    sucralfate  1 g Oral 4 times per day       Social History:     Social History     Socioeconomic History    Marital status: Single     Spouse name: Not on file    Number of children: Not on file    Years of education: Not on file    Highest education level: Not on file   Occupational History    Not on file   Social Needs    Financial resource strain: Not on file    Food insecurity:     Worry: Not on file     Inability: Not on file    Transportation needs:     Medical: Not on file     Non-medical: Not on file   Tobacco Use    Smoking status: Former Smoker     Packs/day: 2.00     Years: 25.00     Pack years: 50.00     Types: Cigarettes     Last attempt to quit: 1995     Years since quittin.8    Smokeless tobacco: Never Used   Substance and Sexual Activity    Alcohol use: No     Alcohol/week: 0.0 oz     Comment: occassionally    Drug use: No    Sexual activity: Not on file   Lifestyle    Physical activity:     Days per week: Not on file     Minutes per session: Not on file    Stress: Not on file   Relationships    Social connections:     Talks on phone: Not on file     Gets together: Not on file     Attends [employer]ism service: Not on file     Active member of club or organization: Not on file     Attends meetings of clubs or organizations: Not on file     Relationship status: Not on file    Intimate partner violence:     Fear of current or ex partner: Not on file     Emotionally abused: Not on file     Physically abused: Not on file     Forced sexual activity: Not on file   Other Topics Concern    Not on file   Social History Narrative    Not on file       Family History:     Family History   Problem Relation Age of Onset    Alzheimer's Disease Mother     Arthritis Mother     Diabetes Father     Heart Disease Father     Heart Disease Paternal Grandfather         Allergies:   Keflex [cephalexin]     Review of Systems:     Review of Systems Constitutional: Negative for activity change and appetite change. HENT: Negative for congestion. Eyes: Negative for itching. Respiratory: Negative for cough, choking, shortness of breath and stridor. Cardiovascular: Negative for chest pain and palpitations. Gastrointestinal: Negative for abdominal pain. Endocrine: Negative for heat intolerance. Genitourinary: Negative for dysuria and flank pain. Musculoskeletal: Negative for arthralgias. Skin: Positive for color change and wound. Allergic/Immunologic: Negative for food allergies. Neurological: Negative for dizziness. Hematological: Negative for adenopathy. Psychiatric/Behavioral: Negative for agitation. Physical Examination :     Patient Vitals for the past 8 hrs:   BP Temp Temp src Pulse Resp SpO2   05/28/19 1230 129/71 99.3 °F (37.4 °C) Oral 108 19 97 %   05/28/19 0924 -- -- -- -- -- 92 %   05/28/19 0801 117/62 98.7 °F (37.1 °C) Oral 101 22 96 %       Physical Exam   Constitutional: He is oriented to person, place, and time. He appears well-developed and well-nourished. No distress. HENT:   Head: Normocephalic and atraumatic. Nose: Nose normal.   Mouth/Throat: Oropharynx is clear and moist.   Eyes: Pupils are equal, round, and reactive to light. Conjunctivae are normal. No scleral icterus. Neck: Neck supple. No tracheal deviation present. Cardiovascular: Normal rate, regular rhythm and normal heart sounds. Exam reveals no gallop and no friction rub. No murmur heard. Pulmonary/Chest: Effort normal. No stridor. No respiratory distress. He has no wheezes. He has no rales. Abdominal: Soft. He exhibits no distension. There is no tenderness. There is no guarding. Genitourinary:   Genitourinary Comments: Urine clear   Musculoskeletal: He exhibits no edema or deformity. Neurological: He is alert and oriented to person, place, and time. No cranial nerve deficit. Skin: Skin is warm and dry. He is not diaphoretic.  No erythema. No pallor. Left heel necrotic foul and very soft   Psychiatric: He has a normal mood and affect. His behavior is normal.         Medical Decision Making:   I have independently reviewed/ordered the following labs:    CBC with Differential:   Recent Labs     05/27/19 2009 05/28/19  0740   HGB 7.5* 7.3*   HCT 23.4* 22.6*     BMP:  Recent Labs     05/27/19  0641 05/28/19  0527    132*   K 4.2 4.0    99   CO2 25 23   BUN 16 14   CREATININE 1.06 1.06   MG 1.8 1.9     Hepatic Function Panel:   Recent Labs     05/28/19  0527   PROT 6.4   LABALBU 2.3*   BILIDIR 0.08   IBILI 0.16   BILITOT 0.24*   ALKPHOS 80   ALT 17   AST 21     No results for input(s): RPR in the last 72 hours. No results for input(s): HIV in the last 72 hours. No results for input(s): BC in the last 72 hours. Lab Results   Component Value Date    CREATININE 1.06 05/28/2019    GLUCOSE 164 05/28/2019       Detailed results: Thank you for allowing us to participate in the care of this patient. Please call with questions. This note is created with the assistance of a speech recognition program.  While intending to generate adocument that actually reflects the content of the visit, the document can still have some errors including those of syntax and sound a like substitutions which may escape proof reading. It such instances, actual meaningcan be extrapolated by contextual diversion.     Kortney Reddy MD  Office: [phone]  Perfect serve / office [phone]

## 2019-05-28 NOTE — CARE COORDINATION
Transitional Planning  Spoke to patient and daughter Yolanda Brito regarding discharge plan. Yolanda Brito states patient lives alone and will be unable to return home independently. Would like referrals to [address] [place] [address]  [facility] sent. Second choice [facility].     1800 - Call to daughter Yolanda Brito, per her request, updated on plan for I&D tomorrow at 5:30 pm.

## 2019-05-28 NOTE — PROGRESS NOTES
Vancomycin Day: 2    Patient's labs, cultures, vitals, and vancomycin regimen reviewed. No changes today. Renal function appears to have stabilized. Vancomycin dose increased to 1250 mg q 12 hrs starting today at 1300. Vancomycin trough ordered for Wednesday 5/29/19 at 1200.   Quinton [facility]  5/28/2019  10:07 AM

## 2019-05-28 NOTE — PROGRESS NOTES
Weight Bearing Restrictions  Left Lower Extremity Weight Bearing: Non Weight Bearing  Position Activity Restriction  Other position/activity restrictions: Up with assist, NWB L LE     Subjective   General  Chart Reviewed: No  Patient assessed for rehabilitation services?: Yes  Family / Caregiver Present: No  Diagnosis: hyperkalemia   Subjective  Subjective: co-eval with PT   General Comment  Comments: RN ok'd for therapy this morning. Pt agreeable to participate in session and cooperative throughout   Pain Assessment  Pain Assessment: 0-10  Pain Level: 3  Pain Type: Chronic pain  Pain Location: Ankle  Non-Pharmaceutical Pain Intervention(s): Therapeutic presence;Distraction; Emotional support  Response to Pain Intervention: Patient Satisfied    Oxygen Therapy  O2 Device: None (Room air)    Social/Functional History  Social/Functional History  Lives With: Alone(and dog )  Type of Home: House  Home Layout: One level  Home Access: Stairs to enter without rails  Entrance Stairs - Number of Steps: 2   Bathroom Shower/Tub: Walk-in shower  Bathroom Toilet: Standard  Bathroom Equipment: Grab bars in shower(pt reported thinking about geting shower chair )  Home Equipment: Cane, Rolling walker(pt reported using cane majority of time )  ADL Assistance: Independent  Homemaking Assistance: Independent  Homemaking Responsibilities: Yes  Meal Prep Responsibility: Primary  Laundry Responsibility: Primary  Cleaning Responsibility: Primary  Shopping Responsibility: Primary  Ambulation Assistance: Independent  Transfer Assistance: Independent  Active : Yes  Mode of Transportation: Car  Occupation: Retired  Type of occupation:    Additional Comments: pt reported family able to assist PRN      Objective   Vision: Impaired  Vision Exceptions: Wears glasses at all times  Hearing: Within functional limits    Orientation  Overall Orientation Status: Within Functional Limits     Balance  Sitting Balance: Supervision(~6

## 2019-05-28 NOTE — CONSULTS
MRI for surg plans  No pos blood cx    Summary of relevant labs:  Labs:  W 27 -8.9    Micro:  blood cx x 1 bacillus  Left foot cx MRSA and E coli  U cx SC neg    Imaging: Foot Xray neg for OM  US renal neg  CXR 5/22/19 bibasilar atelectasis     I have personally reviewed the past medical history, past surgical history, medications, social history, and family history, and I haveupdated the database accordingly.   Past Medical History:     Past Medical History:   Diagnosis Date    Depression     Diabetes ([facility] [address]     DM (diabetes mellitus screen)     Hyperlipemia     Insomnia     Osteoarthritis        Past Surgical  History:     Past Surgical History:   Procedure Laterality Date    HERNIA REPAIR      TONSILLECTOMY      UPPER GASTROINTESTINAL ENDOSCOPY N/A 5/23/2019    EGD DIAGNOSTIC ONLY performed by Antoniet[facility] Vel[facility] OR       Medications:      iron sucrose  200 mg Intravenous Q24H    insulin glargine  40 Units Subcutaneous BID    pantoprazole  40 mg Oral BID AC    vancomycin  1,250 mg Intravenous Q12H    vancomycin (VANCOCIN) intermittent dosing (placeholder)   Other RX Placeholder    vitamin D3  800 Units Oral Daily    insulin lispro  0-18 Units Subcutaneous TID WC    insulin lispro  0-9 Units Subcutaneous Nightly    metoprolol tartrate  12.5 mg Oral BID    sodium chloride (PF)  10 mL Intravenous BID    heparin (porcine)  5,000 Units Subcutaneous 3 times per day    QUEtiapine  300 mg Oral Nightly    IVPB builder   Intravenous Q8H    lidocaine   Topical Once    citalopram  30 mg Oral Daily    atorvastatin  40 mg Oral Daily    sodium chloride flush  10 mL Intravenous 2 times per day    gabapentin  100 mg Oral TID    sodium chloride  250 mL Intravenous Once    fluconazole  100 mg Intravenous Q24H    sucralfate  1 g Oral 4 times per day       Social History:     Social History     Socioeconomic History    Marital status: Single     Spouse name: Not on file    Number of children: Not on file    Years of education: Not on file    Highest education level: Not on file   Occupational History    Not on file   Social Needs    Financial resource strain: Not on file    Food insecurity:     Worry: Not on file     Inability: Not on file    Transportation needs:     Medical: Not on file     Non-medical: Not on file   Tobacco Use    Smoking status: Former Smoker     Packs/day: 2.00     Years: 25.00     Pack years: 50.00     Types: Cigarettes     Last attempt to quit: 1995     Years since quittin.8    Smokeless tobacco: Never Used   Substance and Sexual Activity    Alcohol use: No     Alcohol/week: 0.0 oz     Comment: occassionally    Drug use: No    Sexual activity: Not on file   Lifestyle    Physical activity:     Days per week: Not on file     Minutes per session: Not on file    Stress: Not on file   Relationships    Social connections:     Talks on phone: Not on file     Gets together: Not on file     Attends Anabaptism service: Not on file     Active member of club or organization: Not on file     Attends meetings of clubs or organizations: Not on file     Relationship status: Not on file    Intimate partner violence:     Fear of current or ex partner: Not on file     Emotionally abused: Not on file     Physically abused: Not on file     Forced sexual activity: Not on file   Other Topics Concern    Not on file   Social History Narrative    Not on file       Family History:     Family History   Problem Relation Age of Onset    Alzheimer's Disease Mother     Arthritis Mother     Diabetes Father     Heart Disease Father     Heart Disease Paternal Grandfather         Allergies:   Keflex [cephalexin]     Review of Systems:     Review of Systems   Constitutional: Negative for activity change and appetite change. HENT: Negative for congestion. Eyes: Negative for itching and visual disturbance. Respiratory: Negative for cough, choking and shortness of breath. Cardiovascular: Positive for leg swelling. Negative for chest pain. Gastrointestinal: Negative for abdominal pain. Endocrine: Negative for heat intolerance. Genitourinary: Negative for dysuria and flank pain. Musculoskeletal: Negative for arthralgias and joint swelling. Skin: Positive for wound. Negative for rash. Allergic/Immunologic: Negative for food allergies. Neurological: Negative for dizziness. Hematological: Negative for adenopathy. Psychiatric/Behavioral: Negative for agitation. Physical Examination :     /71   Pulse 108   Temp 99.3 °F (37.4 °C) (Oral)   Resp 19   Ht 5' 10\" (1.778 m)   Wt 210 lb 1.6 oz (95.3 kg)   SpO2 97%   BMI 30.15 kg/m²    Physical Exam   Constitutional: He is oriented to person, place, and time. He appears well-developed and well-nourished. No distress. HENT:   Head: Normocephalic and atraumatic. Nose: Nose normal.   Mouth/Throat: Oropharynx is clear and moist.   Eyes: Pupils are equal, round, and reactive to light. Conjunctivae are normal. No scleral icterus. Neck: Neck supple. No tracheal deviation present. Cardiovascular: Normal rate, regular rhythm and normal heart sounds. Exam reveals no friction rub. No murmur heard. Pulmonary/Chest: Effort normal. No stridor. No respiratory distress. He has no wheezes. He has no rales. Abdominal: Soft. He exhibits no distension and no mass. There is no tenderness. There is no guarding. Genitourinary:   Genitourinary Comments: Urine clear   Musculoskeletal: He exhibits no edema or deformity. Neurological: He is alert and oriented to person, place, and time. No cranial nerve deficit. Skin: Skin is warm and dry. No rash noted. He is not diaphoretic. No erythema. No pallor. Left heel necrotic foul and very soft   Psychiatric: He has a normal mood and affect.  His behavior is normal.         Medical Decision Making:   I have independently reviewed/ordered the following labs:    CBC with

## 2019-05-28 NOTE — PROGRESS NOTES
Progress Note  Podiatric Medicine and Surgery     Subjective     CC: Left foot pain and ulcer    Patient seen and examined at bedside. Afebrile, tachycardic  Pain to heel improved  Denies n/f/c/v/sob/cp  Transferred to MRI after evaluation      HPI :  Ashish Puga is a 76 y.o. male seen at [facility] ED for evaluation of left foot ulcer. The patient has been worked up and labs and vitals indicated that he is Septic, in DKA, and may have possible GI bleed. He reports weakness for many weeks which is why he was unable to follow up for his heel. He states that he was unable to get up and therefore his daughter requested that he go to the ER. He states he was performing dressing changes as instructed. He was seeing Dr. Crystal Charles however states he would like to see a new podiatrist at this time. Plan is for admission to ICU. Patient crossed for 4 units. ROS: Denies N/V/F/C/SOB/CP. Otherwise negative except at stated in the HPI.      Medications:  Scheduled Meds:   iron sucrose  200 mg Intravenous Q24H    insulin glargine  40 Units Subcutaneous BID    magnesium sulfate  1 g Intravenous Once    pantoprazole  40 mg Oral BID AC    vancomycin  1,250 mg Intravenous Q12H    vancomycin (VANCOCIN) intermittent dosing (placeholder)   Other RX Placeholder    vitamin D3  800 Units Oral Daily    insulin lispro  0-18 Units Subcutaneous TID WC    insulin lispro  0-9 Units Subcutaneous Nightly    metoprolol tartrate  12.5 mg Oral BID    sodium chloride (PF)  10 mL Intravenous BID    heparin (porcine)  5,000 Units Subcutaneous 3 times per day    QUEtiapine  300 mg Oral Nightly    IVPB builder   Intravenous Q8H    lidocaine   Topical Once    citalopram  30 mg Oral Daily    atorvastatin  40 mg Oral Daily    sodium chloride flush  10 mL Intravenous 2 times per day    gabapentin  100 mg Oral TID    sodium chloride  250 mL Intravenous Once    fluconazole  100 mg Intravenous Q24H    sucralfate  1 g Oral 4 times per day       Continuous Infusions:   dextrose         PRN Meds:sodium phosphate IVPB **OR** sodium phosphate IVPB, labetalol, oxyCODONE, sodium chloride flush, magnesium hydroxide, ondansetron, senna, acetaminophen, glucose, dextrose, glucagon (rDNA), dextrose    Objective     Vitals:  Patient Vitals for the past 8 hrs:   BP Temp Temp src Pulse Resp SpO2 Weight   19 0924 -- -- -- -- -- 92 % --   19 0801 117/62 98.7 °F (37.1 °C) Oral 101 22 96 % --   19 0600 -- -- -- -- -- -- 210 lb 1.6 oz (95.3 kg)     Average, Min, and Max for last 24 hours Vitals:  TEMPERATURE:  Temp  Av.6 °F (37 °C)  Min: 98.2 °F (36.8 °C)  Max: 98.9 °F (37.2 °C)    RESPIRATIONS RANGE: Resp  Av.3  Min: 19  Max: 23    PULSE RANGE: Pulse  Av.3  Min: 101  Max: 113    BLOOD PRESSURE RANGE:  Systolic (30SXJ), FYI:872 , Min:89 , EJN:925   ; Diastolic (67RKQ), QFV:56, Min:27, Max:141      PULSE OXIMETRY RANGE: SpO2  Av.3 %  Min: 92 %  Max: 98 %    I/O last 3 completed shifts: In: 250 [P.O.:250]  Out: 3050 [Urine:3050]    CBC:  Recent Labs     19  0740   HGB 7.5* 7.5* 7.3*   HCT 23.5* 23.4* 22.6*        BMP:  Recent Labs     19  0519  05   * 135 132*   K 3.9 4.2 4.0   CL 97* 100 99   CO2 24 25 23   BUN 17 16 14   CREATININE 0.98 1.06 1.06   GLUCOSE 179* 224* 164*   CALCIUM 8.2* 8.2* 8.7        Coags:  Recent Labs     19   PROT 6.4       Lab Results   Component Value Date    SEDRATE >130 (H) 2019     No results for input(s): CRP in the last 72 hours. Lower Extremity Physical Exam:  Vascular: DP and PT pulses are non palpable. CFT <5 seconds to all digits. Hair growth is absent to the level of the digits. Mild edema to left heel.       Neuro: Saph/sural/SP/DP/plantar sensation absent to light touch.     Musculoskeletal: Muscle strength is 4/5 to all lower extremity muscle groups.  Gross deformity is present with cavus foot structure and hammer digits 2-5. +POP to the left medial heel.      Dermatologic: Full thickness ulcer #1 located medial left heel and measures approximately 6.0cm x 6.0cm x 0.5cm. boggy eschar superficially. Base is 90% necrotic 10% fibrotic. Periwound skin is macerated. 1 cc seropurulent drainage noted with associated mal odor. Mild erythema present to periwound with associated increase in warmth. Does not probe to bone, sinus track, or undermine. No fluctuance, crepitus, or induration. Interdigital maceration absent.      HPK to distal tuft of R 3rd digit. Imaging:   US RENAL COMPLETE   Final Result   *Right renal cyst of otherwise negative renal ultrasound. *Limited assessment of the bladder with Mcintosh catheter in place without   evidence of gross abnormality. XR TIBIA FIBULA LEFT (2 VIEWS)   Final Result   1. Unremarkable radiographs of the left tibia and fibula. XR FOOT LEFT (MIN 3 VIEWS)   Final Result   1. Soft tissue ulceration along the plantar aspect of the heel. No evidence   of osteomyelitis. Underlying infection cannot be excluded. 2. No fracture. XR CHEST PORTABLE   Final Result   1. Marked low lung volumes with bibasilar atelectasis. MRI FOOT LEFT W WO CONTRAST    (Results Pending)       Assessment   Jessica Munoz is a 76 y.o. male with   1. Sepsis   2. wet gangrene of left medial heel   3. S/p EGD (DOS 5/23/19)  4. Diabetic ketoacidosis  5. Acute renal failure  6. Hyperkeratosis of R 3rd digit  7. Uncontrolled diabetes mellitus with peripheral neuropathy     Principal Problem:    Upper GI bleed  Active Problems:    Essential hypertension    DM type 2, uncontrolled, with neuropathy (HCC)    Hyperkalemia    Anemia    Type 2 diabetes mellitus with left diabetic foot infection (HCC)    Candida esophagitis (HCC)    Bandemia    Fever chills    Sepsis associated hypotension (HCC)  Resolved Problems:    * No resolved hospital problems. *       Plan     · Patient examined and evaluated at bedside   · Treatment options discussed in detail with the patient  · IM - medical management   · ID - vanc/zosyn  · PVR reviewed - JANNETTE 1.2 R 0.96 L; moderate occlusive disease L w/ exercise  · Vasc consulted-  No surgical intervention at this time. · Cx - MRSA, e.coli   · XR of Right foot and Tib/Fib reviewed - no evidence of soft tissue emphysema, areas of soft tissue defects corresponding with wound noted to posterior heel. · Cleaned L heel using betadine  · L foot MRI to assess osseus involvement, pending   · Will need formal debridement in OR, awaiting MRI results. · Dressing changed to left lower extremity: betadine wtd dsd, light ace  · Discussed with Dr. Ghazal Anders DPM   Podiatric Medicine & Surgery   5/28/2019 at 11:16 AM      I performed a history and physical examination of the patient and discussed management with the resident. I reviewed the residents note and agree with the documented findings and plan of care. Any areas of disagreement are noted on the chart. I was personally present for the key portions of any procedures. I have documented in the chart those procedures where I was not present during the key portions. I have reviewed the Podiatry Resident progress note. I agree with the chief complaint, past medical history, past surgical history, allergies, medications, social and family history as documented unless otherwise noted below. Documentation of the HPI, Physical Exam and Medical Decision Making performed by medical students or scribes is based on my personal performance of the HPI, PE and MDM. I have personally evaluated this patient and have completed at least one if not all key elements of the E/M (history, physical exam, and MDM). Additional findings are as noted.      Guille Johnson DPM on 5/28/2019 at 71:46 PM  Board Certified, American Board of Podiatric Surgery  Fellow, [employer]

## 2019-05-28 NOTE — PROGRESS NOTES
Stairs to enter without rails  Entrance Stairs - Number of Steps: 2   Bathroom Shower/Tub: Walk-in shower  Bathroom Toilet: Standard  Bathroom Equipment: Grab bars in shower(pt reported thinking about geting shower chair )  Home Equipment: Cane, Rolling walker(pt reported using cane majority of time )  ADL Assistance: Independent  Homemaking Assistance: Independent  Homemaking Responsibilities: Yes  Meal Prep Responsibility: Primary  Laundry Responsibility: Primary  Cleaning Responsibility: Primary  Shopping Responsibility: Primary  Ambulation Assistance: Independent  Transfer Assistance: Independent  Active : Yes  Mode of Transportation: Car  Occupation: Retired  Type of occupation:    Additional Comments: pt reported family able to assist PRN   Cognition     Objective    AROM RLE (degrees)  RLE AROM: WFL  AROM LLE (degrees)  LLE AROM : WFL  AROM RUE (degrees)  RUE AROM : WFL  AROM LUE (degrees)  LUE AROM : WFL  Strength RLE  Strength RLE: WFL  Strength LLE  Strength LLE: WFL  Strength RUE  Strength RUE: WFL  Strength LUE  Strength LUE: WFL     Sensation  Overall Sensation Status: Impaired  Bed mobility  Rolling to Left: Moderate assistance  Rolling to Right: Moderate assistance  Supine to Sit: Moderate assistance  Sit to Supine: Moderate assistance  Scooting: Moderate assistance     Ambulation  Ambulation?: No  Ambulation 1  Assistance: Moderate assistance  Quality of Gait: supine to sit with mod assist The pt declined an attempt to stand stating that he was beginning to get illand that he had no strength.  He then laid down     Balance  Posture: Good  Sitting - Static: Fair  Sitting - Dynamic: Poor    Plan   Plan  Times per week: 5-6x wk  Current Treatment Recommendations: Strengthening, Balance Training, Endurance Training, Gait Training, Functional Mobility Training, Stair training, Safety Education & Training  Safety Devices  Type of devices: Patient at risk for falls, Left in bed, Call light within reach, Bed alarm in place, Nurse notified    G-Code       OutComes Score    AM-PAC Score  AM-PAC Inpatient Mobility Raw Score : 10  AM-PAC Inpatient T-Scale Score : 32.29  Mobility Inpatient CMS 0-100% Score: 76.75  Mobility Inpatient CMS G-Code Modifier : CL       Goals  Short term goals  Time Frame for Short term goals: 10 visits  Short term goal 1: transfers with SBA  Short term goal 2: amb 50 ft with a RW x SBA  Short term goal 3: ascend/descend 4 steps with SBA  Short term goal 4: exercise program x SBA  Patient Goals   Patient goals : Return home       Therapy Time   Individual Concurrent Group Co-treatment   Time In 5201         Time Out 0915         Minutes 25             1 of [facility], PT

## 2019-05-28 NOTE — PLAN OF CARE
Problem: Falls - Risk of:  Goal: Will remain free from falls  Description  Will remain free from falls  5/27/2019 2144 by Marcie Flowers RN  Outcome: Ongoing  5/27/2019 1743 by Ifeanyi Antonio RN  Outcome: Met This Shift  Goal: Absence of physical injury  Description  Absence of physical injury  5/27/2019 2144 by Marcie Flowers RN  Outcome: Ongoing  5/27/2019 1743 by Ifeaniy Antonio RN  Outcome: Met This Shift     Problem: Nutrition  Goal: Optimal nutrition therapy  Outcome: Ongoing     Problem: Pain:  Goal: Pain level will decrease  Description  Pain level will decrease  Outcome: Ongoing  Goal: Control of acute pain  Description  Control of acute pain  Outcome: Ongoing  Goal: Control of chronic pain  Description  Control of chronic pain  Outcome: Ongoing     Problem: Skin Integrity:  Goal: Will show no infection signs and symptoms  Description  Will show no infection signs and symptoms  5/27/2019 2144 by Marcie Flowers RN  Outcome: Ongoing  5/27/2019 1746 by Ifeanyi Antonio RN  Outcome: Met This Shift  Goal: Absence of new skin breakdown  Description  Absence of new skin breakdown  5/27/2019 2144 by Marcie Flowers RN  Outcome: Ongoing  5/27/2019 1746 by Callum Duran RN  Outcome: Met This Shift

## 2019-05-29 NOTE — ANESTHESIA PRE PROCEDURE
Department of Anesthesiology  Preprocedure Note       Name:  Lydia Padgett   Age:  76 y.o.  :  1950                                          MRN:  [id number]         Date:  2019      Surgeon: Kerri Leung):  Angelita Agosto DPM    Procedure: FOOT DEBRIDEMENT INCISION AND DRAINAGE ROOM 423 (Left )    Medications prior to admission:   Prior to Admission medications    Medication Sig Start Date End Date Taking? Authorizing Provider   Soft Lens Products (STERILE SALINE) SOLN Apply to wound. Cleanse wound than apply as a wet to dry dressing. 19   Lola Dowd DPM   Gauze Pads & Dressings (GAUZE DRESSING) 4\"X4\" PADS Apply to wound. 19   Lola Dowd DPM   Gauze Bandages (ROLLED GAUZE BANDAGE 4\"X2. 5YD) MISC Apply to wound. 19   Yassine Mireles DPM   collagenase (SANTYL) 250 UNIT/GM ointment Apply topically daily. 4/15/19   Lola Dowd DPM   Gauze Pads & Dressings (GAUZE DRESSING) 4\"X4\" PADS Apply to wound. 4/15/19   Lola Dowd DPM   Gauze Bandages (ROLLED GAUZE BANDAGE 4\"X2. 5YD) MISC Apply to wound. 4/15/19   Lola Dowd DPM   Soft Lens Products (SALINE) 0.9 % SOLN Apply 1 Bottle topically 2 times daily Apply a wet to dry twice a day. Can also use to cleanse area prior to dressing 4/15/19   Lola Dowd DPM   blood glucose monitor strips Check blood sugars daily as directed. 3/27/19 3/26/20  Trudy Balbuena MD   atorvastatin (LIPITOR) 40 MG tablet Take 1 tablet by mouth daily 3/27/19   Trudy Balbuena MD   gabapentin (NEURONTIN) 300 MG capsule Take 1 capsule by mouth 3 times daily for 180 days. Intended supply: 30 days 3/27/19 9/23/19  Trudy Balbuena MD   Wound Dressings (FIBRACOL) MISC Apply to wound.  3/11/19   Lola Dowd DPM   Continuous Blood Gluc  (FREESTYLE SOFI 14 DAY READER) DONG Use daily 3/7/19   Trudy Balbuena MD   Continuous Blood Gluc Sensor (FREESTYLE SOFI 14 DAY SENSOR) MISC Use daily 3/7/19 Sepsis associated hypotension (HCC) A41.9, I95.9    Acute osteomyelitis of calcaneum, left (HCC) M86.172    Ulcer of left foot ([facility] L97.529       Past Medical History:        Diagnosis Date    Depression     Diabetes ([facility]     DM (diabetes mellitus screen)     Hyperlipemia     Insomnia     Osteoarthritis        Past Surgical History:        Procedure Laterality Date    HERNIA REPAIR      TONSILLECTOMY      UPPER GASTROINTESTINAL ENDOSCOPY N/A 2019    EGD DIAGNOSTIC ONLY performed by Dejan Cervantes MD at [address] [place] [address] History:    Social History     Tobacco Use    Smoking status: Former Smoker     Packs/day: 2.00     Years: 25.00     Pack years: 50.00     Types: Cigarettes     Last attempt to quit: 1995     Years since quittin.8    Smokeless tobacco: Never Used   Substance Use Topics    Alcohol use: No     Alcohol/week: 0.0 oz     Comment: occassionally                                Counseling given: Not Answered      Vital Signs (Current):   Vitals:    19 0750 19 1130 19 1550 19 1701   BP: (!) 116/56 116/64 (!) 96/54 (!) 112/51   Pulse: 106 91 72 78   Resp:  16   Temp: 98.6 °F (37 °C) 99.3 °F (37.4 °C) 98.2 °F (36.8 °C) 97.2 °F (36.2 °C)   TempSrc: Temporal Oral Temporal Temporal   SpO2: 97% 97% 98% 97%   Weight:       Height:                                                  BP Readings from Last 3 Encounters:   19 (!) 112/51   19 107/76   19 (!) 126/54       NPO Status: Time of last liquid consumption: 1200(sip of water)                        Time of last solid consumption: 0845                        Date of last liquid consumption: 19                        Date of last solid food consumption: 19    BMI:   Wt Readings from Last 3 Encounters:   19 209 lb 14 oz (95.2 kg)   19 200 lb (90.7 kg)   04/15/19 211 lb (95.7 kg)     Body mass index is 30.11 kg/m².     CBC:   Lab Results   Component Value Date    WBC 8.7 05/29/2019    RBC 2.56 05/29/2019    HGB 7.2 05/29/2019    HCT 22.6 05/29/2019    MCV 88.3 05/29/2019    RDW 13.2 05/29/2019     05/29/2019       CMP:   Lab Results   Component Value Date     05/28/2019    K 4.0 05/28/2019    CL 99 05/28/2019    CO2 23 05/28/2019    BUN 14 05/28/2019    CREATININE 1.06 05/28/2019    GFRAA >60 05/28/2019    LABGLOM >60 05/28/2019    GLUCOSE 164 05/28/2019    PROT 6.4 05/28/2019    CALCIUM 8.7 05/28/2019    BILITOT 0.24 05/28/2019    ALKPHOS 80 05/28/2019    AST 21 05/28/2019    ALT 17 05/28/2019       POC Tests:   Recent Labs     05/29/19  1147   POCGLU 154*       Coags:   Lab Results   Component Value Date    PROTIME 11.1 05/22/2019    INR 1.1 05/22/2019    APTT 24.7 05/22/2019       HCG (If Applicable): No results found for: PREGTESTUR, PREGSERUM, HCG, HCGQUANT     ABGs: No results found for: PHART, PO2ART, IDU7TKW, KRQ8QNM, BEART, Q1VITAGW     Type & Screen (If Applicable):  No results found for: [facility]    Anesthesia Evaluation  Patient summary reviewed and Nursing notes reviewed no history of anesthetic complications:   Airway: Mallampati: II        Dental:      Comment: Multiple missing and several lower incisors loose. Risks of loosing them discussed with the patient and is agreeable    Pulmonary:Negative Pulmonary ROS                              Cardiovascular:    (+) hypertension:,                ROS comment: EKG: no acute changes: non specific ST, T changes. Patient denies chest pain or dizziness: activity very limited  ECHO: normal LV function: EF 60%     Neuro/Psych:   (+) psychiatric history:            GI/Hepatic/Renal:            ROS comment: SEVERE GASTRITIS. Endo/Other:    (+) Diabetes, . Abdominal:           Vascular:                                        Anesthesia Plan      general, MAC and TIVA     ASA 3     (Scheduled for MAC)  Induction: intravenous.       Anesthetic plan and risks discussed with

## 2019-05-29 NOTE — PLAN OF CARE
Problem: Falls - Risk of:  Goal: Will remain free from falls  Description  Will remain free from falls  Outcome: Ongoing  Goal: Absence of physical injury  Description  Absence of physical injury  Outcome: Ongoing     Problem: Nutrition  Goal: Optimal nutrition therapy  5/28/2019 2222 by Caitlyn Lawson RN  Outcome: Ongoing  5/28/2019 1501 by Ro Corea RD, LD  Outcome: Ongoing     Problem: Pain:  Goal: Pain level will decrease  Description  Pain level will decrease  Outcome: Ongoing  Goal: Control of acute pain  Description  Control of acute pain  Outcome: Ongoing  Goal: Control of chronic pain  Description  Control of chronic pain  Outcome: Ongoing     Problem: Skin Integrity:  Goal: Will show no infection signs and symptoms  Description  Will show no infection signs and symptoms  Outcome: Ongoing  Goal: Absence of new skin breakdown  Description  Absence of new skin breakdown  Outcome: Ongoing     Problem: Musculor/Skeletal Functional Status  Goal: Highest potential functional level  Outcome: Ongoing

## 2019-05-29 NOTE — BRIEF OP NOTE
PODIATRY BRIEF OP NOTE    PATIENT NAME: David Granda  YOB: 1950  -  76 y.o. male  MRN: [id number]  DATE: 5/29/2019  BILLING #: [id number]    Surgeon(s):  Enid Vizcarra DPM     ASSISTANTS: Eduardo Reyes DPM    PRE-OP DIAGNOSIS:   1. Wet gangrene L medial heel  2. Abscess left foot  3. Diabetes Mellitus type 2 with peripheral neuropathy    POST-OP DIAGNOSIS: Same as above. PROCEDURE:   1. Incision and drainage Left foot   2. Extensive debridement down to level of bone left foot    ANESTHESIA: 10cc 1% lidocaine plain pre op    HEMOSTASIS: Tamponade    ESTIMATED BLOOD LOSS: Less than 20cc. MATERIALS: 1/4 in iodoform packing  * No implants in log *    INJECTABLES: 20 cc 1% lidocaine plain    SPECIMEN: none  * No specimens in log *    COMPLICATIONS: none    FINDINGS: minimal bleeding noted. Extensive necrotic subcutaneous tissue. Sinus tract towards colin pedis. Approximately 12 cc purulent drainage noted deep to plantar fascia. Hard cortical bone noted to calcaneus.      Eduardo Reyes DPM   Podiatric Medicine & Surgery   5/29/2019 at 7:02 PM

## 2019-05-29 NOTE — PROGRESS NOTES
Resident notified that patient bladder scan had 456 ML and patient is refusing to be straight cathed.

## 2019-05-29 NOTE — PROGRESS NOTES
[facility] -   Infectious diseases evaluation  admission date 5/22/2019    reason for consultation:   Left leg wound    Impression :   Current:  · DKA - DM  · Sepsis - shock -- resolved  · Leukocytosis  · Left heel wet gangrene - left diabetic foot infection  · L Calcaneal osteomyelitis and abscess  · severe candida esophagitis vs reflux esophagitis - UGI bleed, EGD 5/23/19  - friable - no biopsy  · CRP elevation 150  · ARF    Other:  ·   Discussion / summary of stay / plan of care   ·   Recommendations   · on zosyn,  and vancomycin IV, Days 7- we needed, total of 6 weeks-till 7/3/19- we need a PICC line  · Aim trough 14-17, pharmacy dosing, which creatinine  · Fluconazole,  Day 7 of 10 - switch to po 100 mg daily- LFTs within normal range - stop on 6/3/19  · Pend surg plans per podiatry this afternoon  · Will follow - disc w pt and RN    Infection Control Recommendations   · [place] Precautions  · Contact Isolation     Antimicrobial Stewardship Recommendations   · Simplification of therapy  · Targeted therapy  · Per Kg dosing      Coordination ofOutpatient Care:   · Estimated Length of IV antimicrobials: Zosyn and vancomycin until 7/3/19  · Patient will need Midline / picc Catheter Insertion: PICC line  · Patient will need SNF: Most likely  · Patient will need outpatient wound care: Yes    History of Present Illness:   Initial history:  Tresa Perez is a 76y.o.-year-old male presented to ER 5/23 w coffee grund emesis - found hypotensive and in DKA, left ankle wet gangrene. Lactic acidosis 3.7, ARF, hyperkalemia, anemia and received blood. Has had the left heel ulcer and was not attending it - now wet gangrene and redness around it. Leukocytosis  27  vasc and podiatry are on the case. We are called for the left heel necrosis and infection.   WBC now is better at 8.9  Creat 0.25    Pt now able to swallow better - left ankle seen and still foul w soft necrotic ankle tissue. Interval changes  5/29/2019   , No fever, chills, no issues with swallowing, very perky, appropriate, asking questions, no abdominal pain,  : For surgery this afternoon. To address the left ankle debrided  Continues on Zosyn at this time      MRI done  lft heel, suggested abscess of the calcaneus, no septic joint suggested. Culture from the foot wound showing MRSA. Intermediate to Levaquin, E. coli sensitive to Levaquin. Summary of relevant labs:  Labs:  W 27 -8.9    Micro:  blood cx x 1 bacillus  Left foot cx MRSA and E coli  U cx SC neg    Imaging: Foot Xray neg for OM  US renal neg  CXR 5/22/19 bibasilar atelectasis     I have personally reviewed the past medical history, past surgical history, medications, social history, and family history, and I haveupdated the database accordingly.   Past Medical History:     Past Medical History:   Diagnosis Date    Depression     Diabetes ([facility] [address]     DM (diabetes mellitus screen)     Hyperlipemia     Insomnia     Osteoarthritis        Past Surgical  History:     Past Surgical History:   Procedure Laterality Date    HERNIA REPAIR      TONSILLECTOMY      UPPER GASTROINTESTINAL ENDOSCOPY N/A 5/23/2019    EGD DIAGNOSTIC ONLY performed by Rachid Garcia MD at [facility] OR       Medications:      tamsulosin  0.4 mg Oral Daily    iron sucrose  200 mg Intravenous Q24H    insulin glargine  40 Units Subcutaneous BID    pantoprazole  40 mg Oral BID AC    vancomycin  1,250 mg Intravenous Q12H    vancomycin (VANCOCIN) intermittent dosing (placeholder)   Other RX Placeholder    vitamin D3  800 Units Oral Daily    insulin lispro  0-18 Units Subcutaneous TID WC    insulin lispro  0-9 Units Subcutaneous Nightly    metoprolol tartrate  12.5 mg Oral BID    sodium chloride (PF)  10 mL Intravenous BID    [Held by provider] heparin (porcine)  5,000 Units Subcutaneous 3 times per day    QUEtiapine  300 mg Oral Nightly    IVPB builder   Intravenous Q8H    lidocaine   Topical Once    citalopram  30 mg Oral Daily    atorvastatin  40 mg Oral Daily    sodium chloride flush  10 mL Intravenous 2 times per day    gabapentin  100 mg Oral TID    sodium chloride  250 mL Intravenous Once    fluconazole  100 mg Intravenous Q24H    sucralfate  1 g Oral 4 times per day       Social History:     Social History     Socioeconomic History    Marital status: Single     Spouse name: Not on file    Number of children: Not on file    Years of education: Not on file    Highest education level: Not on file   Occupational History    Not on file   Social Needs    Financial resource strain: Not on file    Food insecurity:     Worry: Not on file     Inability: Not on file    Transportation needs:     Medical: Not on file     Non-medical: Not on file   Tobacco Use    Smoking status: Former Smoker     Packs/day: 2.00     Years: 25.00     Pack years: 50.00     Types: Cigarettes     Last attempt to quit: 1995     Years since quittin.8    Smokeless tobacco: Never Used   Substance and Sexual Activity    Alcohol use: No     Alcohol/week: 0.0 oz     Comment: occassionally    Drug use: No    Sexual activity: Not on file   Lifestyle    Physical activity:     Days per week: Not on file     Minutes per session: Not on file    Stress: Not on file   Relationships    Social connections:     Talks on phone: Not on file     Gets together: Not on file     Attends Bahai service: Not on file     Active member of club or organization: Not on file     Attends meetings of clubs or organizations: Not on file     Relationship status: Not on file    Intimate partner violence:     Fear of current or ex partner: Not on file     Emotionally abused: Not on file     Physically abused: Not on file     Forced sexual activity: Not on file   Other Topics Concern    Not on file   Social History Narrative    Not on file       Family History:     Family History Problem Relation Age of Onset    Alzheimer's Disease Mother     Arthritis Mother     Diabetes Father     Heart Disease Father     Heart Disease Paternal Grandfather         Allergies:   Keflex [cephalexin]     Review of Systems:     Review of Systems   Constitutional: Negative for activity change, appetite change and chills. HENT: Negative for congestion. Eyes: Negative for itching. Respiratory: Negative for cough, choking, shortness of breath and stridor. Cardiovascular: Negative for chest pain and palpitations. Gastrointestinal: Negative for abdominal pain. Endocrine: Negative for heat intolerance and polyuria. Genitourinary: Negative for dysuria, flank pain and frequency. Musculoskeletal: Negative for arthralgias. Skin: Positive for color change and wound. Allergic/Immunologic: Negative for food allergies. Neurological: Negative for dizziness. Hematological: Negative for adenopathy. Psychiatric/Behavioral: Negative for agitation. Physical Examination :     Patient Vitals for the past 8 hrs:   BP Temp Temp src Pulse Resp SpO2 Weight   05/29/19 0750 (!) 116/56 98.6 °F (37 °C) Temporal 106 20 97 % --   05/29/19 0600 -- -- -- -- -- -- 209 lb 14 oz (95.2 kg)   05/29/19 0424 (!) 115/58 97.4 °F (36.3 °C) Temporal 99 19 96 % --       Physical Exam   Constitutional: He is oriented to person, place, and time. He appears well-developed and well-nourished. No distress. HENT:   Head: Normocephalic and atraumatic. Nose: Nose normal.   Mouth/Throat: Oropharynx is clear and moist.   Eyes: Pupils are equal, round, and reactive to light. Conjunctivae are normal. Right eye exhibits no discharge. Left eye exhibits no discharge. No scleral icterus. Neck: Neck supple. No tracheal deviation present. Cardiovascular: Normal rate, regular rhythm and normal heart sounds. Exam reveals no gallop and no friction rub. No murmur heard. Pulmonary/Chest: Effort normal. No stridor.  No respiratory distress. He has no wheezes. He has no rales. Abdominal: Soft. He exhibits no distension and no mass. There is no tenderness. There is no guarding. Genitourinary:   Genitourinary Comments: Urine clear   Musculoskeletal: He exhibits no edema or deformity. Neurological: He is alert and oriented to person, place, and time. No cranial nerve deficit. Skin: Skin is warm and dry. He is not diaphoretic. No erythema. No pallor. Left heel necrotic foul and very soft   Psychiatric: He has a normal mood and affect. His behavior is normal.         Medical Decision Making:   I have independently reviewed/ordered the following labs:    CBC with Differential:   Recent Labs     05/28/19  0740 05/29/19  0500   WBC  --  8.7   HGB 7.3* 7.2*   HCT 22.6* 22.6*   PLT  --  258   LYMPHOPCT  --  11*   MONOPCT  --  7     BMP:  Recent Labs     05/27/19  0641 05/28/19  0527    132*   K 4.2 4.0    99   CO2 25 23   BUN 16 14   CREATININE 1.06 1.06   MG 1.8 1.9     Hepatic Function Panel:   Recent Labs     05/28/19  0527   PROT 6.4   LABALBU 2.3*   BILIDIR 0.08   IBILI 0.16   BILITOT 0.24*   ALKPHOS 80   ALT 17   AST 21     No results for input(s): RPR in the last 72 hours. No results for input(s): HIV in the last 72 hours. No results for input(s): BC in the last 72 hours. Lab Results   Component Value Date    CREATININE 1.06 05/28/2019    GLUCOSE 164 05/28/2019       Detailed results: Thank you for allowing us to participate in the care of this patient. Please call with questions. This note is created with the assistance of a speech recognition program.  While intending to generate adocument that actually reflects the content of the visit, the document can still have some errors including those of syntax and sound a like substitutions which may escape proof reading. It such instances, actual meaningcan be extrapolated by contextual diversion.     Ang Aguilar MD  Office: [phone]  Perfect serve / office

## 2019-05-29 NOTE — PROGRESS NOTES
Pharmacy Vancomycin Consult     Vancomycin Day: 3  Current Dosin mg q 12 hrs    Temp max:  afebrile    Recent Labs     19  0641 19  0527   BUN 16 14       Recent Labs     19  0641 19  0527   CREATININE 1.06 1.06       Recent Labs     19  0500   WBC 8.7         Intake/Output Summary (Last 24 hours) at 2019 1408  Last data filed at 2019 1132  Gross per 24 hour   Intake 1200 ml   Output 2650 ml   Net -1450 ml       Culture Date      Source                       Results  2019            Blood                          No Growth  2019            Blood                          Pending  2019            Blood                          Pending  2019            Urine                           No growth  2019         Foot Swab                     MRSA and E. coli    Ht Readings from Last 1 Encounters:   19 5' 10\" (1.778 m)        Wt Readings from Last 1 Encounters:   19 209 lb 14 oz (95.2 kg)         Body mass index is 30.11 kg/m². Estimated Creatinine Clearance: 77 mL/min (based on SCr of 1.06 mg/dL). Trough: 19.8  (drawn today 19 at 12:35)    Assessment/Plan: Although therapeutic Vancomycin trough is out of desired range(14-17). Will omit Vancomycin this afternoon and restart tonight at 2200 at a reduced dose of 1000 mg q 12 hrs.   Erica Ruby [facility]  2019  2:15 PM

## 2019-05-29 NOTE — PROGRESS NOTES
[facility]  Internal Medicine Residency Program  Inpatient Daily Progress Note  ______________________________________________________________________________    Patient: Dina Aguilera  YOB: 1950   MRN: [id number]    Acct: [de-identified]     Admit date: 5/22/2019  Today's date: 05/29/19  Number of days in the hospital: 6     Admitting Diagnosis: Upper GI bleed    Subjective:   Pt seen and Chart reviewed. Afebrile. Hemodynamically stable. Awaiting morning labs. MRI results noted. Abscess and fluid collections with some gas. No osteomyelitis noted. Podiatry plan on I&D this evening at 5:30 PM.     ID following. Currently on Vancomycin/Zosn for foot. Diflucan for possible cadidida esophagitis. GI: Severe esophagitis seen on EGD. Possible Candidia. On PPI, Carafate, and Diflucan. Hgb 7.2 today. Lopressor 12.5 started for tachycardia. HR improved. EKG with PAC/PVC and blocked PAC creating irregular rhythm. TSH WNL. Objective:   Vital Sign:  BP (!) 116/56   Pulse 106   Temp 98.6 °F (37 °C) (Temporal)   Resp 20   Ht 5' 10\" (1.778 m)   Wt 209 lb 14 oz (95.2 kg)   SpO2 97%   BMI 30.11 kg/m²       Physical Exam:  General appearance:   alert, well appearing, and in no distress  Mental Status: alert, oriented to person, place, and time  Neurologic:  alert, oriented, normal speech, no focal findings or movement disorder noted  Lungs:  clear to auscultation  Heart[de-identified] normal rate, regular rhythm, normal S1, S2  Abdomen:  soft, nontender, nondistended, no masses or organomegaly  Extremities: peripheral pulses normal, no pedal edema, no clubbing or cyanosis   Skin: normal coloration and turgor, no rashes, no suspicious skin lesions noted. Ulceration of the left foot. Clean wrapping currently.        Medications:  Scheduled Medications   iron sucrose  200 mg Intravenous Q24H    insulin glargine  40 Units Subcutaneous BID    pantoprazole  40 mg Oral BID AC    vancomycin  1,250 mg Intravenous Q12H    vancomycin (VANCOCIN) intermittent dosing (placeholder)   Other RX Placeholder    vitamin D3  800 Units Oral Daily    insulin lispro  0-18 Units Subcutaneous TID WC    insulin lispro  0-9 Units Subcutaneous Nightly    metoprolol tartrate  12.5 mg Oral BID    sodium chloride (PF)  10 mL Intravenous BID    [Held by provider] heparin (porcine)  5,000 Units Subcutaneous 3 times per day    QUEtiapine  300 mg Oral Nightly    IVPB builder   Intravenous Q8H    lidocaine   Topical Once    citalopram  30 mg Oral Daily    atorvastatin  40 mg Oral Daily    sodium chloride flush  10 mL Intravenous 2 times per day    gabapentin  100 mg Oral TID    sodium chloride  250 mL Intravenous Once    fluconazole  100 mg Intravenous Q24H    sucralfate  1 g Oral 4 times per day       PRN Medications    sodium chloride flush 10 mL PRN   sodium phosphate IVPB 0.16 mmol/kg PRN   Or     sodium phosphate IVPB 0.32 mmol/kg PRN   labetalol 10 mg Q6H PRN   oxyCODONE 5 mg Q4H PRN   sodium chloride flush 10 mL PRN   magnesium hydroxide 30 mL Daily PRN   ondansetron 4 mg Q6H PRN   senna 1 tablet Daily PRN   acetaminophen 650 mg Q4H PRN   glucose 15 g PRN   dextrose 12.5 g PRN   glucagon (rDNA) 1 mg PRN   dextrose 100 mL/hr PRN       Diagnostic Labs and Imaging:  CBC:  Recent Labs     05/27/19 2009 05/28/19  0740 05/29/19  0500   WBC  --   --  8.7   HGB 7.5* 7.3* 7.2*   PLT  --   --  258     BMP:   Recent Labs     05/27/19  0641 05/28/19  0527    132*   K 4.2 4.0    99   CO2 25 23   BUN 16 14   CREATININE 1.06 1.06   GLUCOSE 224* 164*     Hepatic:   Recent Labs     05/28/19  0527   AST 21   ALT 17   BILITOT 0.24*   ALKPHOS 80       Assessment and Plan:   · IDDM- Increased Lantus to 40 units BID. Insulin sliding scale . Will not increase as patient will be NPO most of the day today.    · Acute blood loss anemia-Stabilized , EGD showed severe esophagitis on IV PPI BID and carfarate Hb stable at 7.2. On Diflucan. H & H Q12. IV venoferx 3 doses. · LAMBERTO resolved stop IV fluids. Nephrology signed off. · Left leg wound managed by podiatry. MRI results noted. Abscess and fluid collections with some gas. No osteomyelitis noted. Podiatry plan on I&D this evening at 5:30 PM.   · Carb control diet. · Continue home medications Celexa, Gabapentin, Lipitor. · PT OT VINITA Torres MD  Internal Medicine Resident, PGY-1  [facility]; [place], [place]  5/29/2019, 8:31 AM      Attending Physician Statement  I have discussed the case, including pertinent history and exam findings with the resident and the team.  I have seen and examined the patient and the key elements of the encounter have been performed by me. I agree with the assessment, plan and orders as documented by the resident.         Gt Dobson MD, MARKY, [address]  Attending Physician, Internal Medicine Service    Internal Medicine Residency Program  5/29/2019, 10:21 AM

## 2019-05-29 NOTE — PROGRESS NOTES
Progress Note  Podiatric Medicine and Surgery     Subjective     CC: Left foot pain and ulcer    Patient seen and examined at bedside. Afebrile  Pain to heel well controlled  Denies n/f/c/v/sob/cp  Surgery today at 530pm      HPI :  Jessica Munoz is a 76 y.o. male seen at [facility] ED for evaluation of left foot ulcer. The patient has been worked up and labs and vitals indicated that he is Septic, in DKA, and may have possible GI bleed. He reports weakness for many weeks which is why he was unable to follow up for his heel. He states that he was unable to get up and therefore his daughter requested that he go to the ER. He states he was performing dressing changes as instructed. He was seeing Dr. Isidoro Shetty however states he would like to see a new podiatrist at this time. Plan is for admission to ICU. Patient crossed for 4 units. ROS: Denies N/V/F/C/SOB/CP. Otherwise negative except at stated in the HPI.      Medications:  Scheduled Meds:   tamsulosin  0.4 mg Oral Daily    iron sucrose  200 mg Intravenous Q24H    insulin glargine  40 Units Subcutaneous BID    pantoprazole  40 mg Oral BID AC    vancomycin  1,250 mg Intravenous Q12H    vancomycin (VANCOCIN) intermittent dosing (placeholder)   Other RX Placeholder    vitamin D3  800 Units Oral Daily    insulin lispro  0-18 Units Subcutaneous TID WC    insulin lispro  0-9 Units Subcutaneous Nightly    metoprolol tartrate  12.5 mg Oral BID    sodium chloride (PF)  10 mL Intravenous BID    [Held by provider] heparin (porcine)  5,000 Units Subcutaneous 3 times per day    QUEtiapine  300 mg Oral Nightly    IVPB builder   Intravenous Q8H    lidocaine   Topical Once    citalopram  30 mg Oral Daily    atorvastatin  40 mg Oral Daily    sodium chloride flush  10 mL Intravenous 2 times per day    gabapentin  100 mg Oral TID    sodium chloride  250 mL Intravenous Once    fluconazole  100 mg Intravenous Q24H    sucralfate  1 g Oral 4 times per day       Continuous Infusions:   dextrose         PRN Meds:sodium chloride flush, sodium phosphate IVPB **OR** sodium phosphate IVPB, labetalol, oxyCODONE, sodium chloride flush, magnesium hydroxide, ondansetron, senna, acetaminophen, glucose, dextrose, glucagon (rDNA), dextrose    Objective     Vitals:  Patient Vitals for the past 8 hrs:   BP Temp Temp src Pulse Resp SpO2 Weight   19 0750 (!) 116/56 98.6 °F (37 °C) Temporal 106 20 97 % --   19 0600 -- -- -- -- -- -- 209 lb 14 oz (95.2 kg)   19 0424 (!) 115/58 97.4 °F (36.3 °C) Temporal 99 19 96 % --     Average, Min, and Max for last 24 hours Vitals:  TEMPERATURE:  Temp  Av.6 °F (37 °C)  Min: 97.4 °F (36.3 °C)  Max: 99.5 °F (37.5 °C)    RESPIRATIONS RANGE: Resp  Av.2  Min: 19  Max: 23    PULSE RANGE: Pulse  Av.2  Min: 92  Max: 108    BLOOD PRESSURE RANGE:  Systolic (92UIM), BNV:167 , Min:107 , RXY:657   ; Diastolic (19BHF), UJQ:94, Min:48, Max:71      PULSE OXIMETRY RANGE: SpO2  Av %  Min: 96 %  Max: 98 %    I/O last 3 completed shifts: In: 1520 [P.O.:800; I.V.:20; IV Piggyback:700]  Out: 2926 [Urine:5400; Emesis/NG output:60]    CBC:  Recent Labs     19  0740 19  0500   WBC  --   --  8.7   HGB 7.5* 7.3* 7.2*   HCT 23.4* 22.6* 22.6*   PLT  --   --  258        BMP:  Recent Labs     19  0641 19  0527    132*   K 4.2 4.0    99   CO2 25 23   BUN 16 14   CREATININE 1.06 1.06   GLUCOSE 224* 164*   CALCIUM 8.2* 8.7        Coags:  Recent Labs     19  0527   PROT 6.4       Lab Results   Component Value Date    SEDRATE >130 (H) 2019     No results for input(s): CRP in the last 72 hours. Previous exam below. Dressings CDI and left intact due to upcoming surgery    Lower Extremity Physical Exam:  Vascular: DP and PT pulses are non palpable. CFT <5 seconds to all digits. Hair growth is absent to the level of the digits.   Mild edema to left heel.       Neuro: debridement. Patent amenable to surgery. Hold AC. NPO after 9am. Consent obtained and in chart. Scheduled for I&D L foot with possible bone biopsy at 530pm today  · Dressing unchanged to left lower extremity: betadine wtd dsd, light ace  · Will discuss with Michaela Urbina   Podiatric Medicine & Surgery   5/29/2019 at 11:05 AM      I performed a history and physical examination of the patient and discussed management with the resident. I reviewed the residents note and agree with the documented findings and plan of care. Any areas of disagreement are noted on the chart. I was personally present for the key portions of any procedures. I have documented in the chart those procedures where I was not present during the key portions. I have reviewed the Podiatry Resident progress note. I agree with the chief complaint, past medical history, past surgical history, allergies, medications, social and family history as documented unless otherwise noted below. Documentation of the HPI, Physical Exam and Medical Decision Making performed by medical students or scribes is based on my personal performance of the HPI, PE and MDM. I have personally evaluated this patient and have completed at least one if not all key elements of the E/M (history, physical exam, and MDM). Additional findings are as noted.      Eduardo Reyes DPM on 5/29/2019 at 42:26 AM  Board Certified, American Board of Podiatric Surgery  Fellow, [employer] [facility] and [facility]

## 2019-05-30 NOTE — OP NOTE
PATIENT NAME: Kathlene Heimlich  YOB: 1950  -  76 y.o. male  MRN: [id number]  DATE: 5/29/2019  BILLING #: [id number]     Surgeon(s):  Anatoly David DPM      ASSISTANTS: Virginia Hoover DPM     PRE-OP DIAGNOSIS:   1. Wet gangrene L medial heel  2. Abscess left foot  3. Diabetes Mellitus type 2 with peripheral neuropathy     POST-OP DIAGNOSIS: Same as above.     PROCEDURE:   1. Incision and drainage Left foot   2. Extensive debridement down to level of bone left foot     ANESTHESIA: 10cc 1% lidocaine plain pre op     HEMOSTASIS: Tamponade     ESTIMATED BLOOD LOSS: Less than 20cc.     MATERIALS: 1/4 in iodoform packing  * No implants in log *     INJECTABLES: 20 cc 1% lidocaine plain     SPECIMEN: none  * No specimens in log *     COMPLICATIONS: none     FINDINGS: minimal bleeding noted. Extensive necrotic subcutaneous tissue. Sinus tract towards colin pedis. Approximately 12 cc purulent drainage noted deep to plantar fascia. Hard cortical bone noted to calcaneus.        Indications:  Patient demonstrates abscess of the left foot. Surgery is indicated at this time to treat infection and establish healthy viable tissue for optimal healing. All risks, benefits, and alternatives to the procedure were explained to the patient who voiced understanding. A consent was signed and placed in the chart. Procedure:  Patient was transferred to the operating room and placed on the table in the supine position. Following induction of general anesthesia, the left foot was scrubbed, prepped, and draped in the usual aseptic manner. Local anesthesia was obtained in the left foot using a total of 10cc of 1% lidocaine plain. Following a timeout:  Attention was directed to the L medial calcaneal wound. Wound base was noted to be boggy gangrenous base with purulent drainage measuring approximately 6.0cm x 6.0cm x 0.5cm. Using 15 blade the eschar was resected down to subcutaneous tissue.  Subcutaneous tissue was noted to be necrotic and dystrophic. Utilizing ronguer and 15 blade nonviable tissue was removed down to calcaneus. An additional 20 cc of 1% lidocaine plain was injected in the left foot for local anesthesia. Cortical bone was noted to be hard and plantar fascia was visualized. Fascia appeared intact and healthy. Using blunt dissection with hemostat a sinus tract was noted and approximately 12 cc of sanguinous purulence was expressed deep to the plantar fascia. Attention was drawn to the plantar lateral heel where a significant portion of the calcaneal plantar fat pad was noted to be necrotic and devitalized. Necrotic tissue was resected using 15 blade. The wound was flushed with copious amounts of normal sterile saline via pulse lavage. No further purulence was expressed following irrigation. The incision was packed with 1/4 in iodoform and a dry sterile dressing consisting of 4x4 gauze, abd pads, and kerlix. Patient tolerated the procedure and the anesthesia well and was transferred to the recovery room with vital signs stable. Following a period of post-operative monitoring the patient will be transferred back to floors.      Electronically signed by Héctor Cortez DPM on 5/30/2019 at 3:44 PM

## 2019-05-30 NOTE — PROGRESS NOTES
Progress Note  Podiatric Medicine and Surgery     Subjective     CC: Left foot pain and ulcer    Patient seen and examined at bedside. Afebrile. Pain to heel well controlled. Denies n/f/c/v/sob/cp    HPI :  Yashira Velasquez is a 76 y.o. male seen at [facility] ED for evaluation of left foot ulcer. The patient has been worked up and labs and vitals indicated that he is Septic, in DKA, and may have possible GI bleed. He reports weakness for many weeks which is why he was unable to follow up for his heel. He states that he was unable to get up and therefore his daughter requested that he go to the ER. He states he was performing dressing changes as instructed. He was seeing Dr. Jono hTorne however states he would like to see a new podiatrist at this time. Plan is for admission to ICU. Patient crossed for 4 units. ROS: Denies N/V/F/C/SOB/CP. Otherwise negative except at stated in the HPI.      Medications:  Scheduled Meds:   tamsulosin  0.4 mg Oral Daily    vancomycin  1,000 mg Intravenous Q12H    insulin glargine  40 Units Subcutaneous BID    pantoprazole  40 mg Oral BID AC    vancomycin (VANCOCIN) intermittent dosing (placeholder)   Other RX Placeholder    vitamin D3  800 Units Oral Daily    insulin lispro  0-18 Units Subcutaneous TID WC    insulin lispro  0-9 Units Subcutaneous Nightly    metoprolol tartrate  12.5 mg Oral BID    sodium chloride (PF)  10 mL Intravenous BID    [Held by provider] heparin (porcine)  5,000 Units Subcutaneous 3 times per day    QUEtiapine  300 mg Oral Nightly    IVPB builder   Intravenous Q8H    lidocaine   Topical Once    citalopram  30 mg Oral Daily    atorvastatin  40 mg Oral Daily    sodium chloride flush  10 mL Intravenous 2 times per day    gabapentin  100 mg Oral TID    sodium chloride  250 mL Intravenous Once    fluconazole  100 mg Intravenous Q24H    sucralfate  1 g Oral 4 times per day       Continuous Infusions:   dextrose         PRN Unremarkable radiographs of the left tibia and fibula. XR FOOT LEFT (MIN 3 VIEWS)   Final Result   1. Soft tissue ulceration along the plantar aspect of the heel. No evidence   of osteomyelitis. Underlying infection cannot be excluded. 2. No fracture. XR CHEST PORTABLE   Final Result   1. Marked low lung volumes with bibasilar atelectasis. Assessment   Veronica Mahoney is a 76 y.o. male with   1. Sepsis   2. wet gangrene of left medial heel   3. S/p EGD (DOS 5/23/19)  4. Diabetic ketoacidosis  5. Acute renal failure  6. Hyperkeratosis of R 3rd digit  7. Uncontrolled diabetes mellitus with peripheral neuropathy     Principal Problem:    Upper GI bleed  Active Problems:    Essential hypertension    DM type 2, uncontrolled, with neuropathy (HCC)    Hyperkalemia    Anemia    Type 2 diabetes mellitus with left diabetic foot infection (HCC)    Candida esophagitis (HCC)    Bandemia    Fever chills    Sepsis associated hypotension (HCC)    Acute osteomyelitis of calcaneum, left (HCC)    Ulcer of left foot (HCC)    Abscess of foot without toes, left    Ulcer of heel, left, with necrosis of muscle ([facility] [address]  Resolved Problems:    * No resolved hospital problems. *       Plan     · Patient examined and evaluated at bedside   · Treatment options discussed in detail with the patient  · IM - medical management   · ID - vanc/zosyn  · Dressing changed to left lower extremity: iodoform packing dsd, light ace  · Will plan to apply a wound vac Saturday if the ulcer remains healthy. · Will discuss with Dr. Ananya Carroll DPM   Podiatric Medicine & Surgery   5/30/2019 at 9:03 AM    I performed a history and physical examination of the patient and discussed management with the resident. I reviewed the residents note and agree with the documented findings and plan of care. Any areas of disagreement are noted on the chart. I was personally present for the key portions of any procedures.  I have documented in the chart those procedures where I was not present during the key portions. I have reviewed the Podiatry Resident progress note. I agree with the chief complaint, past medical history, past surgical history, allergies, medications, social and family history as documented unless otherwise noted below. Documentation of the HPI, Physical Exam and Medical Decision Making performed by medical students or scribes is based on my personal performance of the HPI, PE and MDM. I have personally evaluated this patient and have completed at least one if not all key elements of the E/M (history, physical exam, and MDM). Additional findings are as noted.      Val Goncalves DPM on 5/30/2019 at 5:86 PM  Board Certified, American Board of Podiatric Surgery  Fellow, [employer] and [facility]

## 2019-05-30 NOTE — PROGRESS NOTES
Good  Sitting - Static: Fair  Sitting - Dynamic: Poor;+  Comments: encouraged B UE support       Pt vomiting upon entering room, assisted with with clean up. Pt dangled at side of bed x 5-6 minutes with constant cues for posture and balance. Pt continually stating he feels he will falls and leaning when not getting cues. Pt reassured throughout of importance of OOB and need for mobility to increase strength. Pt refused sitting there ex   Supine heel slides, hip abd x 10 reps      Assessment   Body structures, Functions, Activity limitations: Decreased functional mobility ; Decreased endurance;Decreased balance;Decreased strength  Assessment: Pt with limited mobility, unable to stand or transfers due to weakness and fear of falling.   Pt unsafe to live home alone in current state  Prognosis: Good  Patient Education: importance of mobility  REQUIRES PT FOLLOW UP: Yes  Activity Tolerance  Activity Tolerance: Patient limited by fatigue;Patient limited by endurance          Goals  Short term goals  Time Frame for Short term goals: 10 visits  Short term goal 1: transfers with SBA  Short term goal 2: amb 50 ft with a RW x SBA  Short term goal 3: ascend/descend 4 steps with SBA  Short term goal 4: exercise program x SBA  Patient Goals   Patient goals : Return home    Plan    Plan  Times per week: 5-6x wk  Current Treatment Recommendations: Strengthening, Balance Training, Endurance Training, Gait Training, Functional Mobility Training, Stair training, Safety Education & Training  Safety Devices  Type of devices: Patient at risk for falls, Left in bed, Call light within reach, Bed alarm in place, Nurse notified  Restraints  Initially in place: No     Therapy Time   Individual Concurrent Group Co-treatment   Time In 1000         Time Out 1025         Minutes [address], PT

## 2019-05-30 NOTE — PROGRESS NOTES
[facility]  Internal Medicine Residency Program  Inpatient Daily Progress Note  ______________________________________________________________________________    Patient: Veronica Mahoney  YOB: 1950   MRN: [id number]    Acct: [de-identified]     Admit date: 5/22/2019  Today's date: 05/30/19  Number of days in the hospital: 7     Admitting Diagnosis: Upper GI bleed    Subjective:   Pt seen and Chart reviewed. Afebrile. Hemodynamically stable. Awaiting morning labs. MRI results noted. Abscess and fluid collections with some gas. No osteomyelitis noted. I&D yesterday by podiatry. Await further recs. No culture taken during I&D??    ID following. Currently on Vancomycin/Zosn for foot. Diflucan for possible cadidida esophagitis. GI: Severe esophagitis seen on EGD. Possible Candidia. On PPI, Carafate, and Diflucan. Lopressor 12.5 started for tachycardia. HR improved. EKG with PAC/PVC and blocked PAC creating irregular rhythm. TSH WNL. Objective:   Vital Sign:  BP (!) 123/52   Pulse 99   Temp 97.9 °F (36.6 °C) (Temporal)   Resp 22   Ht 5' 10\" (1.778 m)   Wt 203 lb 7.8 oz (92.3 kg)   SpO2 96%   BMI 29.20 kg/m²       Physical Exam:  General appearance:   alert, well appearing, and in no distress  Mental Status: alert, oriented to person, place, and time  Neurologic:  alert, oriented, normal speech, no focal findings or movement disorder noted  Lungs:  clear to auscultation  Heart[de-identified] normal rate, regular rhythm, normal S1, S2  Abdomen:  soft, nontender, nondistended, no masses or organomegaly  Extremities: peripheral pulses normal, no pedal edema, no clubbing or cyanosis   Skin: normal coloration and turgor, no rashes, no suspicious skin lesions noted. Ulceration of the left foot. Clean wrapping currently.        Medications:  Scheduled Medications   tamsulosin  0.4 mg Oral Daily    vancomycin  1,000 mg Intravenous Q12H    insulin glargine  40 Units Subcutaneous BID    pantoprazole  40 mg Oral BID AC    vancomycin (VANCOCIN) intermittent dosing (placeholder)   Other RX Placeholder    vitamin D3  800 Units Oral Daily    insulin lispro  0-18 Units Subcutaneous TID WC    insulin lispro  0-9 Units Subcutaneous Nightly    metoprolol tartrate  12.5 mg Oral BID    sodium chloride (PF)  10 mL Intravenous BID    [Held by provider] heparin (porcine)  5,000 Units Subcutaneous 3 times per day    QUEtiapine  300 mg Oral Nightly    IVPB builder   Intravenous Q8H    lidocaine   Topical Once    citalopram  30 mg Oral Daily    atorvastatin  40 mg Oral Daily    sodium chloride flush  10 mL Intravenous 2 times per day    gabapentin  100 mg Oral TID    sodium chloride  250 mL Intravenous Once    fluconazole  100 mg Intravenous Q24H    sucralfate  1 g Oral 4 times per day       PRN Medications    sodium chloride flush 10 mL PRN   sodium phosphate IVPB 0.16 mmol/kg PRN   Or     sodium phosphate IVPB 0.32 mmol/kg PRN   labetalol 10 mg Q6H PRN   oxyCODONE 5 mg Q4H PRN   sodium chloride flush 10 mL PRN   magnesium hydroxide 30 mL Daily PRN   ondansetron 4 mg Q6H PRN   senna 1 tablet Daily PRN   acetaminophen 650 mg Q4H PRN   glucose 15 g PRN   dextrose 12.5 g PRN   glucagon (rDNA) 1 mg PRN   dextrose 100 mL/hr PRN       Diagnostic Labs and Imaging:  CBC:  Recent Labs     05/27/19 2009 05/28/19  0740 05/29/19  0500   WBC  --   --  8.7   HGB 7.5* 7.3* 7.2*   PLT  --   --  258     BMP:   Recent Labs     05/28/19  0527   *   K 4.0   CL 99   CO2 23   BUN 14   CREATININE 1.06   GLUCOSE 164*     Hepatic:   Recent Labs     05/28/19  0527   AST 21   ALT 17   BILITOT 0.24*   ALKPHOS 80       Assessment and Plan:   · IDDM- Increased Lantus to 40 units BID. Insulin sliding scale. · Acute blood loss anemia-Stabilized , EGD showed severe esophagitis on IV PPI BID and carfarate Hb stable at 7.2. On Diflucan Day 8/10. H & H Q12. IV venoferx 3 doses.    · LAMBERTO resolved stop IV fluids. Nephrology signed off. · Left leg wound managed by podiatry. MRI results noted. Abscess and fluid collections with some gas. S/p I&D yesterday by podiatry. Awaiting final recommendations per ID for antibiotics. · Carb control diet. · Continue home medications Celexa, Gabapentin, Lipitor. · PT OT VINITA Kim MD  Internal Medicine Resident, PGY-1  [address] [place], [place]  5/30/2019, 9:53 AM      Attending Physician Statement  I have discussed the case, including pertinent history and exam findings with the resident and the team.  I have seen and examined the patient and the key elements of the encounter have been performed by me. I agree with the assessment, plan and orders as documented by the resident.       Gt Weems MD, MARKY, [address]  Attending Physician, Internal Medicine Service    Internal Medicine Residency Program  5/30/2019, 12:27 PM

## 2019-05-30 NOTE — PLAN OF CARE
Problem: Falls - Risk of:  Goal: Will remain free from falls  Description  Will remain free from falls  Outcome: Ongoing  Goal: Absence of physical injury  Description  Absence of physical injury  Outcome: Ongoing     Problem: Nutrition  Goal: Optimal nutrition therapy  Outcome: Ongoing     Problem: Pain:  Goal: Pain level will decrease  Description  Pain level will decrease  Outcome: Ongoing  Goal: Control of acute pain  Description  Control of acute pain  Outcome: Ongoing  Goal: Control of chronic pain  Description  Control of chronic pain  Outcome: Ongoing     Problem: Skin Integrity:  Goal: Will show no infection signs and symptoms  Description  Will show no infection signs and symptoms  Outcome: Ongoing  Goal: Absence of new skin breakdown  Description  Absence of new skin breakdown  Outcome: Ongoing     Problem: Musculor/Skeletal Functional Status  Goal: Highest potential functional level  Outcome: Ongoing

## 2019-05-30 NOTE — ANESTHESIA POSTPROCEDURE EVALUATION
Department of Anesthesiology  Postprocedure Note    Patient: David Granda  MRN: [id number]  YOB: 1950  Date of evaluation: 2019  Time:  8:55 PM     Procedure Summary     Date:  19 Room / Location:  [address] OR    Anesthesia Start:   Anesthesia Stop:      Procedure:  FOOT DEBRIDEMENT INCISION AND DRAINAGE  (Left Foot) Diagnosis:  (ABCESS LEFT FOOT)    Surgeon:  Enid Vizcarra DPM Responsible Provider:  Francis Plaza MD    Anesthesia Type:  general, MAC, TIVA ASA Status:  3          Anesthesia Type: general, MAC, TIVA    Danish Phase I: Danish Score: 10    Danish Phase II:      Last vitals: Reviewed and per EMR flowsheets.        Anesthesia Post Evaluation   Vital Signs (Current)   Vitals:    19   BP:    Pulse:    Resp:    Temp: 98.1 °F (36.7 °C)   SpO2:      Vital Signs Statistics (for past 48 hrs)     Temp  Av.4 °F (36.9 °C)  Min: 97.2 °F (36.2 °C)   Min taken time: 19 1701  Max: 99.5 °F (37.5 °C)   Max taken time: 19 1554  Pulse  Av.2  Min: 67   Min taken time: 19 1550  Max: 108   Max taken time: 19 1230  Resp  Av.4  Min: 0   Min taken time: 19 1855  Max: 32   Max taken time: 19 1816  BP  Min: 86/45   Min taken time: 19 1849  Max: 136/65   Max taken time: 19  SpO2  Av.4 %  Min: 92 %   Min taken time: 19 7113  Max: 100 %   Max taken time: 19 1823    BP Readings from Last 3 Encounters:   19 (!) 93/53   19 (!) 92/55   19 107/76             Level of Consciousness:  Awake    Respiratory:  Stable    Airway :   Patent    Oxygen Saturation:  Stable    Cardiovascular:  Stable    Hydration:  Adequate    PONV:  Stable    Post-op Pain:  Adequate analgesia    Post-op Assessment:  No apparent anesthetic complications    Additional Follow-Up / Treatment / Comment:  None

## 2019-05-31 NOTE — PROGRESS NOTES
Pt placed on table being positioned and prepped.    0930 timeout done  0931 procedure began  0947 dressing placed procedure end

## 2019-05-31 NOTE — PROGRESS NOTES
[facility] -   Infectious diseases evaluation  admission date 5/22/2019    reason for consultation:   Left leg wound    Impression :   Current:  · DKA - DM  · Sepsis - shock -- resolved  · Leukocytosis  · Left heel wet gangrene - left diabetic foot infection  · L Calcaneal osteomyelitis and abscess  · severe candida esophagitis vs reflux esophagitis - UGI bleed, EGD 5/23/19  - friable - no biopsy  · CRP elevation 150  · ARF  · Allergy to cephalopsorine but tolerated zosyn     Other:  ·   Discussion / summary of stay / plan of care   ·   Recommendations   · stop zosyn,   · Start levaquin and flagyl till 7/3/19  · vancomycin IV,   6 weeks-till 7/3/19-   · picc placed - no picc draws  · Aim trough 14-17, pharmacy dosing, which creatinine  · Fluconazole,  Day 9 of 10 - stop after 6/1/19  · Discharge planning today, discussed with discharge planner and podiatry. I will see the patient 2 weeks  · Will follow - disc w pt and RN    Infection Control Recommendations   · [place] Precautions  · Contact Isolation     Antimicrobial Stewardship Recommendations   · Simplification of therapy  · Targeted therapy  · Per Kg dosing      Coordination ofOutpatient Care:   · Estimated Length of IV antimicrobials: vancomycin until 7/3/19  · Patient will need Midline / picc Catheter Insertion: PICC line  · Patient will need SNF: Most likely  · Patient will need outpatient wound care: Yes    History of Present Illness:   Initial history:  Dov Sanchez is a 76y.o.-year-old male presented to ER 5/23 w coffee grund emesis - found hypotensive and in DKA, left ankle wet gangrene. Lactic acidosis 3.7, ARF, hyperkalemia, anemia and received blood. Has had the left heel ulcer and was not attending it - now wet gangrene and redness around it. Leukocytosis  27  vasc and podiatry are on the case. We are called for the left heel necrosis and infection.   WBC now is better at 8.9  Creat Subcutaneous BID    pantoprazole  40 mg Oral BID AC    vancomycin (VANCOCIN) intermittent dosing (placeholder)   Other RX Placeholder    vitamin D3  800 Units Oral Daily    insulin lispro  0-18 Units Subcutaneous TID WC    insulin lispro  0-9 Units Subcutaneous Nightly    metoprolol tartrate  12.5 mg Oral BID    sodium chloride (PF)  10 mL Intravenous BID    heparin (porcine)  5,000 Units Subcutaneous 3 times per day    QUEtiapine  300 mg Oral Nightly    IVPB builder   Intravenous Q8H    lidocaine   Topical Once    citalopram  30 mg Oral Daily    atorvastatin  40 mg Oral Daily    sodium chloride flush  10 mL Intravenous 2 times per day    gabapentin  100 mg Oral TID    sodium chloride  250 mL Intravenous Once    fluconazole  100 mg Intravenous Q24H    sucralfate  1 g Oral 4 times per day       Social History:     Social History     Socioeconomic History    Marital status: Single     Spouse name: Not on file    Number of children: Not on file    Years of education: Not on file    Highest education level: Not on file   Occupational History    Not on file   Social Needs    Financial resource strain: Not on file    Food insecurity:     Worry: Not on file     Inability: Not on file    Transportation needs:     Medical: Not on file     Non-medical: Not on file   Tobacco Use    Smoking status: Former Smoker     Packs/day: 2.00     Years: 25.00     Pack years: 50.00     Types: Cigarettes     Last attempt to quit: 1995     Years since quittin.8    Smokeless tobacco: Never Used   Substance and Sexual Activity    Alcohol use: No     Alcohol/week: 0.0 oz     Comment: occassionally    Drug use: No    Sexual activity: Not on file   Lifestyle    Physical activity:     Days per week: Not on file     Minutes per session: Not on file    Stress: Not on file   Relationships    Social connections:     Talks on phone: Not on file     Gets together: Not on file     Attends Episcopalian service: Not on file     Active member of club or organization: Not on file     Attends meetings of clubs or organizations: Not on file     Relationship status: Not on file    Intimate partner violence:     Fear of current or ex partner: Not on file     Emotionally abused: Not on file     Physically abused: Not on file     Forced sexual activity: Not on file   Other Topics Concern    Not on file   Social History Narrative    Not on file       Family History:     Family History   Problem Relation Age of Onset    Alzheimer's Disease Mother     Arthritis Mother     Diabetes Father     Heart Disease Father     Heart Disease Paternal Grandfather         Allergies:   Keflex [cephalexin]     Review of Systems:     Review of Systems   Constitutional: Positive for fatigue. Negative for activity change, appetite change, chills and fever. HENT: Negative for congestion. Eyes: Negative for photophobia and itching. Respiratory: Negative for cough, choking, shortness of breath, wheezing and stridor. Cardiovascular: Negative for chest pain and palpitations. Gastrointestinal: Negative for abdominal pain. Endocrine: Negative for heat intolerance and polyuria. Genitourinary: Negative for dysuria, flank pain, frequency and hematuria. Musculoskeletal: Negative for arthralgias. Skin: Positive for color change and wound. Allergic/Immunologic: Negative for food allergies. Neurological: Negative for dizziness and weakness. Hematological: Negative for adenopathy. Psychiatric/Behavioral: Negative for agitation. Physical Examination :     Patient Vitals for the past 8 hrs:   BP Temp Temp src Pulse Resp SpO2   05/31/19 0955 (!) 144/61 -- -- 92 21 97 %   05/31/19 0931 131/63 -- -- 94 17 99 %   05/31/19 0900 125/68 -- -- 95 14 99 %   05/31/19 0809 136/64 98 °F (36.7 °C) Oral 85 20 98 %       Physical Exam   Constitutional: He is oriented to person, place, and time. He appears well-developed and well-nourished.  No distress. HENT:   Head: Normocephalic and atraumatic. Nose: Nose normal.   Mouth/Throat: Oropharynx is clear and moist.   Eyes: Pupils are equal, round, and reactive to light. Conjunctivae are normal. Right eye exhibits no discharge. Left eye exhibits no discharge. No scleral icterus. Neck: Neck supple. No tracheal deviation present. Cardiovascular: Normal rate, regular rhythm and normal heart sounds. Exam reveals no gallop and no friction rub. Pulmonary/Chest: Effort normal and breath sounds normal. No stridor. No respiratory distress. He has no wheezes. He has no rales. He exhibits tenderness. Abdominal: Soft. He exhibits no distension and no mass. There is no tenderness. There is no guarding. Genitourinary:   Genitourinary Comments: Urine clear   Musculoskeletal: He exhibits no edema or deformity. Neurological: He is alert and oriented to person, place, and time. No cranial nerve deficit. Skin: Skin is warm and dry. He is not diaphoretic. No erythema. No pallor. Left heel wound, status post debridement, is dressed   Psychiatric: He has a normal mood and affect. His behavior is normal.         Medical Decision Making:   I have independently reviewed/ordered the following labs:    CBC with Differential:   Recent Labs     05/30/19  0923 05/31/19  0552   WBC 9.9 8.7   HGB 6.9* 7.2*   HCT 23.7* 23.4*    322   LYMPHOPCT 10* 12*   MONOPCT 7 6     BMP:  Recent Labs     05/30/19  0923   *   K 3.8      CO2 18*   BUN 14   CREATININE 1.17     Hepatic Function Panel:   No results for input(s): PROT, LABALBU, BILIDIR, IBILI, BILITOT, ALKPHOS, ALT, AST in the last 72 hours. No results for input(s): RPR in the last 72 hours. No results for input(s): HIV in the last 72 hours. No results for input(s): BC in the last 72 hours. Lab Results   Component Value Date    CREATININE 1.17 05/30/2019    GLUCOSE 245 05/30/2019       Detailed results:         Thank you for allowing us to participate in the care of this patient. Please call with questions. This note is created with the assistance of a speech recognition program.  While intending to generate adocument that actually reflects the content of the visit, the document can still have some errors including those of syntax and sound a like substitutions which may escape proof reading. It such instances, actual meaningcan be extrapolated by contextual diversion.     Ashkan Dumont MD  Office: [phone]  Perfect serve / office [phone]

## 2019-05-31 NOTE — DISCHARGE SUMMARY
well as calcium, insulin for hyperkalemia 6.6. Patient also found to have hemoglobin 7.1, rectal per ER resident was Hemoccult negative. Patient was typed and crossed for 4 units. His prior hemoglobin in February was 12. EGD was performed on 5/23/19 and showed entire esophagus was severely inflammed with the white to black discharge. Possibly caustic ingestion or a combination of Candida esophagitis and reflux. No biopsy due to risk of worsening bleeding. Patient was started on PPI, Carafate, and Diflucan for possible Candida. Nephrology was consulted for LAMBERTO and was initially treated with Bicarb drip and later transitioned to IV hydration and PO intake with a return of creatinine to baseline. Patient eventually transitioned out of the ICU setting when blood pressure and Hgb stabilized. Infectious disease and podiatry were consulted for sepsis secondary to left heel gangrene. Patient was started on Vancomycin and Zosyn for sepsis secondary to Left foot gangrene. MRI was done on the left foot. MRI results noted. Abscess and fluid collections with some gas. No osteomyelitis noted. I&D was performed by podiatry on 5/29/19. Patient was discharged to SNF facility. Prior to discharge patient remained afebrile and without leukocytosis. Hemodynamically stable. Hgb was stable between 7.0-7.5. Completed IV venofer x 3 days and discharged on ferrous sulfate. Podiatry cleared patient for discharge with placement of wound vac to be done at outlying facility. Patient was also cleared by Infectious disease with a treatment of Vancomycin, Flagyl, and Cipro till 7/3/19 (total 6 weeks) of abx therapy. Patient discharged with Carafate and PPI. Patient to follow up with infectious disease in 2 weeks, podiatry in 1-2 weeks, and GI for repeat EGD in 4-6 weeks.  Labs to follow up on per infectious disease to be Vancomycin troughs twice weekly to ensure trough between 14-17 & Creatinine weekly while patient is on creatinine. CBC weekly to monitor Hgb. Procedures:    EGD  I&D    Any Hospital Acquired Infections: none    Discharge Functional Status:  stable    Disposition: home    Patient Instructions:   Current Discharge Medication List      START taking these medications    Details   vitamin D3 (CHOLECALCIFEROL) 400 units TABS tablet Take 2 tablets by mouth daily  Qty: 30 tablet, Refills: 2      sucralfate (CARAFATE) 1 GM tablet Take 1 tablet by mouth 4 times daily  Qty: 120 tablet, Refills: 0      pantoprazole (PROTONIX) 40 MG tablet Take 1 tablet by mouth 2 times daily (before meals) for 30 days, THEN 1 tablet daily. Qty: 150 tablet, Refills: 0      ciprofloxacin (CIPRO) 500 MG tablet Take 1 tablet by mouth 2 times daily  Qty: 68 tablet, Refills: 0      metroNIDAZOLE (FLAGYL) 500 MG tablet Take 1 tablet by mouth 3 times daily  Qty: 102 tablet, Refills: 0      metoprolol tartrate (LOPRESSOR) 25 MG tablet Take 0.5 tablets by mouth 2 times daily  Qty: 60 tablet, Refills: 3      fluconazole (DIFLUCAN) 100 MG tablet Take 1 tablet by mouth daily for 2 days  Qty: 2 tablet, Refills: 0      ferrous sulfate 325 (65 Fe) MG tablet Take 1 tablet by mouth 2 times daily  Qty: 60 tablet, Refills: 3      vancomycin (VANCOCIN) infusion Infuse 1,000 mg intravenously every 12 hours Stop 7/3/19 -  Pharmacy to dose, get a trough twice a week and a creatinine twice a week, CBC weekly   Aim trough between 14 and 17   Adjust dose as needed. After patient uses home   Compound per protocol. Qty: 96524 mg, Refills: 0         CONTINUE these medications which have NOT CHANGED    Details   !! Soft Lens Products (STERILE SALINE) SOLN Apply to wound. Cleanse wound than apply as a wet to dry dressing. Qty: 1 Bottle, Refills: 1    Associated Diagnoses: Pressure injury of left heel, stage 3 ([address] Pain in left foot; Type 2 diabetes mellitus with peripheral vascular disease ([address]  Diabetic polyneuropathy associated with type 2 diabetes mellitus Uncontrolled type 2 diabetes mellitus with hyperglycemia (HCC)      atorvastatin (LIPITOR) 40 MG tablet Take 1 tablet by mouth daily  Qty: 90 tablet, Refills: 1    Associated Diagnoses: Mixed hyperlipidemia      gabapentin (NEURONTIN) 300 MG capsule Take 1 capsule by mouth 3 times daily for 180 days. Intended supply: 30 days  Qty: 90 capsule, Refills: 5    Associated Diagnoses: Diabetic ulcer of left heel associated with type 2 diabetes mellitus, unspecified ulcer stage ([address]      Wound Dressings (FIBRACOL) MISC Apply to wound. Qty: 12 each, Refills: 1    Associated Diagnoses: Ulcer of ankle with fat layer exposed, left ([address] Acute left ankle pain; Type 2 diabetes mellitus with peripheral vascular disease ([address]  Diabetic polyneuropathy associated with type 2 diabetes mellitus (HCC)      Continuous Blood Gluc  (FREESTYLE SOFI 14 DAY READER) DONG Use daily  Qty: 2 Device, Refills: 5    Associated Diagnoses: Uncontrolled type 2 diabetes mellitus with hyperglycemia (HCC)      Continuous Blood Gluc Sensor (FREESTYLE SOFI 14 DAY SENSOR) MISC Use daily  Qty: 2 each, Refills: 5    Associated Diagnoses: Uncontrolled type 2 diabetes mellitus with hyperglycemia (HCC)      lisinopril (PRINIVIL;ZESTRIL) 5 MG tablet Take 1 tablet by mouth daily  Qty: 30 tablet, Refills: 5      diclofenac (VOLTAREN) 75 MG EC tablet Take 1 tablet by mouth 2 times daily (with meals)  Qty: 60 tablet, Refills: 3      insulin glargine (TOUJEO SOLOSTAR) 300 UNIT/ML injection pen Inject 50 Units into the skin nightly  Qty: 2 pen, Refills: 5    Associated Diagnoses: DM type 2, uncontrolled, with neuropathy (HCC)      metFORMIN (GLUCOPHAGE) 1000 MG tablet Take 1 tablet by mouth 2 times daily (with meals)  Qty: 180 tablet, Refills: 1    Associated Diagnoses: DM type 2, uncontrolled, with neuropathy (HCC)      citalopram (CELEXA) 20 MG tablet Take 30 mg by mouth daily       QUEtiapine (SEROQUEL) 300 MG tablet Take 300 mg by mouth daily        !! - Potential duplicate medications found. Please discuss with provider. Activity: activity as tolerated    Diet: regular diet    Follow-up:    Harriet Barker MD  [facility] [place] [address]  [address] [address]  [phone]    Schedule an appointment as soon as possible for a visit  Call for follow up. Needs EGD in 4-6 weeks - asses for healing    Savanah Kent MD  [address] [place] [address],  [address] [address], [facility]  [phone]    In 2 weeks  Please call for appointment to follow up with Dr. Kavon Palma. [address] [facility],[address]  [address] [phone]  In 1 week  Please call office and make appointment in one week. [facility], [address] [place] [address] [address] [phone]    Schedule an appointment as soon as possible for a visit      Follow up labs:   CBC weekly to monitor Hgb. Creatinine weekly to monitor creatinine while on Vancomycin  Vancomycin trough twice weekly.     Follow up imaging: None    Note that over 30 minutes was spent in preparing discharge papers, discussing discharge with patient, medication review, etc.      Juan Logan MD         Department of Internal Medicine  [facility]         5/31/2019, 5:10 PM

## 2019-05-31 NOTE — PROGRESS NOTES
[facility] -   Infectious diseases evaluation  admission date 5/22/2019    reason for consultation:   Left leg wound    Impression :   Current:  · DKA - DM  · Sepsis - shock -- resolved  · Leukocytosis  · Left heel wet gangrene - left diabetic foot infection  · L Calcaneal osteomyelitis and abscess  · severe candida esophagitis vs reflux esophagitis - UGI bleed, EGD 5/23/19  - friable - no biopsy  · CRP elevation 150  · ARF    Other:  ·   Discussion / summary of stay / plan of care   ·   Recommendations   · on zosyn,  and vancomycin IV, Days 7- we needed, total of 6 weeks-till 7/3/19- we need a PICC line  · Aim trough 14-17, pharmacy dosing, which creatinine  · Fluconazole,  Day 7 of 10 - switch to po 100 mg daily- LFTs within normal range - stop on 6/3/19  · Pend surg plans per podiatry this afternoon  · Will follow - disc w pt and RN    Infection Control Recommendations   · [place] Precautions  · Contact Isolation     Antimicrobial Stewardship Recommendations   · Simplification of therapy  · Targeted therapy  · Per Kg dosing      Coordination ofOutpatient Care:   · Estimated Length of IV antimicrobials: Zosyn and vancomycin until 7/3/19  · Patient will need Midline / picc Catheter Insertion: PICC line  · Patient will need SNF: Most likely  · Patient will need outpatient wound care: Yes    History of Present Illness:   Initial history:  Victorina Mars is a 76y.o.-year-old male presented to ER 5/23 w coffee grund emesis - found hypotensive and in DKA, left ankle wet gangrene. Lactic acidosis 3.7, ARF, hyperkalemia, anemia and received blood. Has had the left heel ulcer and was not attending it - now wet gangrene and redness around it. Leukocytosis  27  vasc and podiatry are on the case. We are called for the left heel necrosis and infection.   WBC now is better at 8.9  Creat 0.25    Pt now able to swallow better - left ankle seen and still foul w soft necrotic ankle tissue. Interval changes  5/30/2019   Tired to refuse to have the PICC line, abdomen is soft, nontender has no diarrhea, would like to sleep. He is not short of breath. No skin rash. Left foot is dressed  . Dysphagia, resolved on the Diflucan    Status post I&D of the left calcaneus with excision debridement to the level of the bone, abscess drainage. Or cultures remain pending  Cultures from the left foot from 5/23. Continue to follow MRSA and mixed other bacteria    MRI done  lft heel, suggested abscess of the calcaneus, no septic joint suggested. Culture from the foot wound showing MRSA. Intermediate to Levaquin, E. coli sensitive to Levaquin. Summary of relevant labs:  Labs:  W 27 -8.9    Micro:  blood cx x 1 bacillus  Left foot cx MRSA and E coli  U cx SC neg    Imaging: Foot Xray neg for OM  US renal neg  CXR 5/22/19 bibasilar atelectasis     I have personally reviewed the past medical history, past surgical history, medications, social history, and family history, and I haveupdated the database accordingly.   Past Medical History:     Past Medical History:   Diagnosis Date    Depression     Diabetes ([facility]     DM (diabetes mellitus screen)     Hyperlipemia     Insomnia     Osteoarthritis        Past Surgical  History:     Past Surgical History:   Procedure Laterality Date    FOOT DEBRIDEMENT Left 5/29/2019    FOOT DEBRIDEMENT INCISION AND DRAINAGE  performed by Giovany Balderas DPM at [address] [place] [address]      UPPER GASTROINTESTINAL ENDOSCOPY N/A 5/23/2019    EGD DIAGNOSTIC ONLY performed by Erin Mcfarlane MD at [facility]       Medications:      tamsulosin  0.4 mg Oral Daily    vancomycin  1,000 mg Intravenous Q12H    insulin glargine  40 Units Subcutaneous BID    pantoprazole  40 mg Oral BID AC    vancomycin (VANCOCIN) intermittent dosing (placeholder)   Other RX Placeholder    vitamin D3  800 Units Oral Daily    insulin lispro  0-18 Units Subcutaneous TID WC    insulin lispro  0-9 Units Subcutaneous Nightly    metoprolol tartrate  12.5 mg Oral BID    sodium chloride (PF)  10 mL Intravenous BID    [Held by provider] heparin (porcine)  5,000 Units Subcutaneous 3 times per day    QUEtiapine  300 mg Oral Nightly    IVPB builder   Intravenous Q8H    lidocaine   Topical Once    citalopram  30 mg Oral Daily    atorvastatin  40 mg Oral Daily    sodium chloride flush  10 mL Intravenous 2 times per day    gabapentin  100 mg Oral TID    sodium chloride  250 mL Intravenous Once    fluconazole  100 mg Intravenous Q24H    sucralfate  1 g Oral 4 times per day       Social History:     Social History     Socioeconomic History    Marital status: Single     Spouse name: Not on file    Number of children: Not on file    Years of education: Not on file    Highest education level: Not on file   Occupational History    Not on file   Social Needs    Financial resource strain: Not on file    Food insecurity:     Worry: Not on file     Inability: Not on file    Transportation needs:     Medical: Not on file     Non-medical: Not on file   Tobacco Use    Smoking status: Former Smoker     Packs/day: 2.00     Years: 25.00     Pack years: 50.00     Types: Cigarettes     Last attempt to quit: 1995     Years since quittin.8    Smokeless tobacco: Never Used   Substance and Sexual Activity    Alcohol use: No     Alcohol/week: 0.0 oz     Comment: occassionally    Drug use: No    Sexual activity: Not on file   Lifestyle    Physical activity:     Days per week: Not on file     Minutes per session: Not on file    Stress: Not on file   Relationships    Social connections:     Talks on phone: Not on file     Gets together: Not on file     Attends Taoism service: Not on file     Active member of club or organization: Not on file     Attends meetings of clubs or organizations: Not on file     Relationship status: Not on file   [place] Intimate partner violence:     Fear of current or ex partner: Not on file     Emotionally abused: Not on file     Physically abused: Not on file     Forced sexual activity: Not on file   Other Topics Concern    Not on file   Social History Narrative    Not on file       Family History:     Family History   Problem Relation Age of Onset    Alzheimer's Disease Mother     Arthritis Mother     Diabetes Father     Heart Disease Father     Heart Disease Paternal Grandfather         Allergies:   Keflex [cephalexin]     Review of Systems:     Review of Systems   Constitutional: Positive for fatigue. Negative for activity change, appetite change, chills and fever. HENT: Negative for congestion. Eyes: Negative for photophobia and itching. Respiratory: Negative for cough, choking, shortness of breath and stridor. Cardiovascular: Negative for chest pain and palpitations. Gastrointestinal: Negative for abdominal pain. Endocrine: Negative for heat intolerance and polyuria. Genitourinary: Negative for dysuria, flank pain and frequency. Musculoskeletal: Negative for arthralgias. Skin: Positive for color change and wound. Allergic/Immunologic: Negative for food allergies. Neurological: Negative for dizziness. Hematological: Negative for adenopathy. Psychiatric/Behavioral: Negative for agitation. Physical Examination :     Patient Vitals for the past 8 hrs:   BP Temp Temp src Pulse Resp SpO2   05/30/19 2230 124/61 97.9 °F (36.6 °C) Oral 84 16 96 %   05/30/19 2149 (!) 123/57 -- -- 94 -- --   05/30/19 2124 -- -- -- 83 18 96 %   05/30/19 1915 (!) 106/45 100.1 °F (37.8 °C) Oral -- -- --   05/30/19 1829 -- -- -- 87 17 98 %   05/30/19 1828 -- -- -- 88 15 98 %   05/30/19 1827 (!) 95/47 -- -- 92 20 98 %   05/30/19 1826 -- -- -- 92 19 97 %   05/30/19 1626 115/62 97.3 °F (36.3 °C) Temporal 83 18 95 %       Physical Exam   Constitutional: He is oriented to person, place, and time.  He appears Detailed results: Thank you for allowing us to participate in the care of this patient. Please call with questions. This note is created with the assistance of a speech recognition program.  While intending to generate adocument that actually reflects the content of the visit, the document can still have some errors including those of syntax and sound a like substitutions which may escape proof reading. It such instances, actual meaningcan be extrapolated by contextual diversion.     Silver Gilman MD  Office: [phone]  Perfect serve / office [phone]

## 2019-05-31 NOTE — PROGRESS NOTES
Progress Note  Podiatric Medicine and Surgery     Subjective     CC: Left foot pain and ulcer    Patient seen and examined at bedside. Afebrile. Pain to heel well controlled. Denies n/f/c/v/sob/cp    HPI :  Robbie Bowens is a 76 y.o. male seen at [facility] ED for evaluation of left foot ulcer. The patient has been worked up and labs and vitals indicated that he is Septic, in DKA, and may have possible GI bleed. He reports weakness for many weeks which is why he was unable to follow up for his heel. He states that he was unable to get up and therefore his daughter requested that he go to the ER. He states he was performing dressing changes as instructed. He was seeing Dr. Herve Godinez however states he would like to see a new podiatrist at this time. Plan is for admission to ICU. Patient crossed for 4 units. ROS: Denies N/V/F/C/SOB/CP. Otherwise negative except at stated in the HPI.      Medications:  Scheduled Meds:   tamsulosin  0.4 mg Oral Daily    vancomycin  1,000 mg Intravenous Q12H    insulin glargine  40 Units Subcutaneous BID    pantoprazole  40 mg Oral BID AC    vancomycin (VANCOCIN) intermittent dosing (placeholder)   Other RX Placeholder    vitamin D3  800 Units Oral Daily    insulin lispro  0-18 Units Subcutaneous TID WC    insulin lispro  0-9 Units Subcutaneous Nightly    metoprolol tartrate  12.5 mg Oral BID    sodium chloride (PF)  10 mL Intravenous BID    heparin (porcine)  5,000 Units Subcutaneous 3 times per day    QUEtiapine  300 mg Oral Nightly    IVPB builder   Intravenous Q8H    lidocaine   Topical Once    citalopram  30 mg Oral Daily    atorvastatin  40 mg Oral Daily    sodium chloride flush  10 mL Intravenous 2 times per day    gabapentin  100 mg Oral TID    sodium chloride  250 mL Intravenous Once    fluconazole  100 mg Intravenous Q24H    sucralfate  1 g Oral 4 times per day       Continuous Infusions:   dextrose         PRN Meds:sodium chloride    Dermatologic: Full thickness ulcer #1 located medial left heel and measures approximately 6.0cm x 6.0cm x 0.5cm. Plantar fascia present in wound bed. Base is fibrotic. Periwound skin is macerated. No drainage noted. No malodor. Mild erythema present to periwound with associated increase in warmth. Does not probe to bone, sinus track, or undermine. No fluctuance, crepitus, or induration. Interdigital maceration absent.      HPK to distal tuft of R 3rd digit. Imaging:   MRI FOOT LEFT W WO CONTRAST   Final Result   1. Deep soft tissue ulceration at the posteromedial plantar hindfoot, which   is tracking to a small fluid and gas collection abutting the plantar aspect   of the medial posterior calcaneus, compatible with an abscess measuring 2.1 x   1.1 x 2.9 cm. This is centered within the flexor digitorum brevis muscle   with mild extension into the abductor digiti minimi as well. There is   diffuse signal abnormality in the intrinsic muscles, which could be related   to myositis versus diabetic neuropathy. 2.  Minimal T2 signal abnormality in the subjacent posteromedial calcaneus   and plantar calcaneal spur, possibly reactive osteitis. No T1 signal   abnormality to indicate osteomyelitis at this time. 3.  Fluid and enhancement in the flexor hallucis longus and flexor digitorum   longus tendon sheaths at the level of the knot of Ismael. This is compatible   with tenosynovitis, and possibility of infected tenosynovitis is not excluded. 4.  Small joint effusions at the tibiotalar, subtalar, and talonavicular   joints, which are nonspecific. No definite MRI findings to indicate septic   arthritis. US RENAL COMPLETE   Final Result   *Right renal cyst of otherwise negative renal ultrasound. *Limited assessment of the bladder with Mcintosh catheter in place without   evidence of gross abnormality. XR TIBIA FIBULA LEFT (2 VIEWS)   Final Result   1.  Unremarkable radiographs of the left tibia and fibula. XR FOOT LEFT (MIN 3 VIEWS)   Final Result   1. Soft tissue ulceration along the plantar aspect of the heel. No evidence   of osteomyelitis. Underlying infection cannot be excluded. 2. No fracture. XR CHEST PORTABLE   Final Result   1. Marked low lung volumes with bibasilar atelectasis. IR PICC WO SQ PORT/PUMP < 5 YEARS    (Results Pending)       Assessment   Jessica Munoz is a 76 y.o. male with   1. Sepsis   2. wet gangrene of left medial heel   3. S/p EGD (DOS 5/23/19)  4. Diabetic ketoacidosis  5. Acute renal failure  6. Hyperkeratosis of R 3rd digit  7. Uncontrolled diabetes mellitus with peripheral neuropathy     Principal Problem:    Upper GI bleed  Active Problems:    Essential hypertension    DM type 2, uncontrolled, with neuropathy (HCC)    Hyperkalemia    Anemia    Type 2 diabetes mellitus with left diabetic foot infection (HCC)    Candida esophagitis (HCC)    Bandemia    Fever chills    Sepsis associated hypotension (HCC)    Acute osteomyelitis of calcaneum, left (HCC)    Ulcer of left foot (HCC)    Abscess of foot without toes, left    Ulcer of heel, left, with necrosis of muscle ([facility] [address]  Resolved Problems:    * No resolved hospital problems. *       Plan     · Patient examined and evaluated at bedside   · Treatment options discussed in detail with the patient  · IM - medical management   · ID - vanc/zosyn  · Dressing changed to left lower extremity: iodoform packing dsd, light ace  · SNF to apply wound vac. · F/U with Dr Brenden Shanks at [address] [place] [address] in 1 week. · Patient seen with Dr. Joshua Smith DPM   Podiatric Medicine & Surgery   5/31/2019 at 9:24 AM      I performed a history and physical examination of the patient and discussed management with the resident. I reviewed the residents note and agree with the documented findings and plan of care. Any areas of disagreement are noted on the chart.  I was personally present for the key portions of any procedures. I have documented in the chart those procedures where I was not present during the key portions. I have reviewed the Podiatry Resident progress note. I agree with the chief complaint, past medical history, past surgical history, allergies, medications, social and family history as documented unless otherwise noted below. Documentation of the HPI, Physical Exam and Medical Decision Making performed by medical students or scribes is based on my personal performance of the HPI, PE and MDM. I have personally evaluated this patient and have completed at least one if not all key elements of the E/M (history, physical exam, and MDM). Additional findings are as noted.      Deanna Jacobo DPM on 6/1/2019 at 54:29 AM  Board Certified, American Board of Podiatric Surgery  Fellow, [employer] and [facility]

## 2019-05-31 NOTE — PROGRESS NOTES
Yashira Velasquez is a 76 y.o. male patient.     Current Facility-Administered Medications   Medication Dose Route Frequency Provider Last Rate Last Dose    sodium chloride flush 0.9 % injection 10 mL  10 mL Intravenous 2 times per day JUDE Wadsworth        sodium chloride flush 0.9 % injection 10 mL  10 mL Intravenous PRN JUDE Wadsworth        tamsulosin [facility]) capsule 0.4 mg  0.4 mg Oral Daily Bong Chicago, DPM   0.4 mg at 05/30/19 0810    vancomycin (VANCOCIN) 1000 mg in dextrose 5% 200 mL IVPB  1,000 mg Intravenous Q12H Bong Chicago, DPM   Stopped at 05/30/19 2248    insulin glargine (LANTUS) injection vial 40 Units  40 Units Subcutaneous BID Bong Chicago, DPM   40 Units at 05/30/19 2146    pantoprazole (PROTONIX) tablet 40 mg  40 mg Oral BID AC Bong Chicago, DPM   40 mg at 05/31/19 3024    vancomycin (VANCOCIN) intermittent dosing (placeholder)   Other RX Placeholder Bong Chicago, DPM        sodium chloride flush 0.9 % injection 10 mL  10 mL Intravenous PRN Bong Chicago, DPM        sodium phosphate 14.73 mmol in dextrose 5 % 250 mL IVPB  0.16 mmol/kg Intravenous PRN Bong Chicago, DPM        Or    sodium phosphate 29.43 mmol in dextrose 5 % 250 mL IVPB  0.32 mmol/kg Intravenous PRN Bong [place], DPM        vitamin D3 (CHOLECALCIFEROL) tablet 800 Units  800 Units Oral Daily Bong Chicago, DPM   800 Units at 05/30/19 0810    insulin lispro (HUMALOG) injection vial 0-18 Units  0-18 Units Subcutaneous TID WC Bong Chicago, DPM   3 Units at 05/30/19 5983    insulin lispro (HUMALOG) injection vial 0-9 Units  0-9 Units Subcutaneous Nightly Bong Chicago, DPM   3 Units at 05/30/19 2146    labetalol (NORMODYNE;TRANDATE) injection 10 mg  10 mg Intravenous Q6H PRN Bong [place], DPM   10 mg at 05/27/19 1913    oxyCODONE (ROXICODONE) immediate release tablet 5 mg  5 mg Oral Q4H PRN Bong [place], DPM   5 mg at 05/27/19 1818    metoprolol tartrate (LOPRESSOR) tablet 12.5 mg  12.5 mg g  15 g Oral PRN Robert Pian, DPM        dextrose 50 % solution 12.5 g  12.5 g Intravenous PRN Robert Pian, DPM        glucagon (rDNA) injection 1 mg  1 mg Intramuscular PRN Robert Pian, DPM        dextrose 5 % solution  100 mL/hr Intravenous PRN Robert Pian, DPM         Allergies   Allergen Reactions    Keflex [Cephalexin] Hives     Principal Problem:    Upper GI bleed  Active Problems:    Essential hypertension    DM type 2, uncontrolled, with neuropathy (HCC)    Hyperkalemia    Anemia    Type 2 diabetes mellitus with left diabetic foot infection (HCC)    Candida esophagitis (HCC)    Bandemia    Fever chills    Sepsis associated hypotension (HCC)    Acute osteomyelitis of calcaneum, left (HCC)    Ulcer of left foot (HCC)    Abscess of foot without toes, left    Ulcer of heel, left, with necrosis of muscle ([facility] [address]  Resolved Problems:    * No resolved hospital problems. *    Blood pressure (!) 144/61, pulse 92, temperature 98 °F (36.7 °C), temperature source Oral, resp. rate 21, height 5' 10\" (1.778 m), weight 203 lb 7.8 oz (92.3 kg), SpO2 97 %.     Subjective  Objective  Assessment & Plan    Shantanu Rosen RN  5/31/2019

## 2019-05-31 NOTE — DISCHARGE INSTR - COC
Continuity of Care Form    Patient Name: Sarah Reyes   :  1950  MRN:  [id number]    Admit date:  2019  Discharge date:  ***    Code Status Order: Full Code   Advance Directives:   Advance Care Flowsheet Documentation     Date/Time Healthcare Directive Type of Healthcare Directive Copy in [address] Agent's Name Healthcare Agent's Phone Number    [phone]  Yes, patient has an advance directive for healthcare treatment  --  --  --  --  --          Admitting Physician:  Gt Reyes MD  PCP: Cecy Melendez MD    Discharging Nurse: [facility] Unit/Room#: 4002/6427-05  Discharging Unit Phone Number: ***    Emergency Contact:   Extended Emergency Contact Information  Primary Emergency Contact: Nicole Hutchinson [address] Phone: [phone]  Mobile Phone: [phone]  Relation: Child   needed?  No  Secondary Emergency Contact: Marco Dye [address] Phone: [phone]  Mobile Phone: [phone]  Relation: Brother/Sister    Past Surgical History:  Past Surgical History:   Procedure Laterality Date    FOOT DEBRIDEMENT Left 2019    FOOT DEBRIDEMENT INCISION AND DRAINAGE  performed by Carly Krause DPM at [address] [place] [address]      UPPER GASTROINTESTINAL ENDOSCOPY N/A 2019    EGD DIAGNOSTIC ONLY performed by Yaz Chan MD at [address] [place] [address] History:   Immunization History   Administered Date(s) Administered    Influenza Vaccine, unspecified formulation 2015    Influenza, High Dose (Fluzone 65 yrs and older) 2017    Influenza, Triv, inactivated, subunit, adjuvanted, IM (Fluad 65 yrs and older) 10/16/2018    Pneumococcal 13-valent Conjugate ([id number]) 2017, 2018    Pneumococcal Polysaccharide (Saipxjety10) 10/16/2018       Active Problems:  Patient Active Problem List   Diagnosis Code    Pure hypercholesterolemia

## 2019-05-31 NOTE — PROGRESS NOTES
Pt NPO for scheduled PICC placement this am. Pt agreeable to having placement done. VSS, this am, no distress noted. Pt transported to have PICC placed in IR per transport staff. 0930-Orders noted to transfer pt to Rm 245. Report called, to receiving nurse on unit. IR notified and will transport pt.

## 2019-05-31 NOTE — PROGRESS NOTES
Nutrition Assessment    Type and Reason for Visit: Reassess    Nutrition Recommendations:   - Continue current Carb Control, No Added Salt diet. Encourage/monitor intakes as tolerated. - Will provide Ensure Clear Liquid ONS with meals.  - Monitor labs, appetite, and nausea/vomiting. Nutrition Assessment: Pt continues to take less than 50% of meals. Pt with c/o nausea and episode of clear, mucousy emesis this morning. Will provide oral supplements with meals to boost intakes. Malnutrition Assessment:  · Malnutrition Status: Meets the criteria for moderate malnutrition  · Context: (Acute on Chronic)  · Findings of the 6 clinical characteristics of malnutrition (Minimum of 2 out of 6 clinical characteristics is required to make the diagnosis of moderate or severe Protein Calorie Malnutrition based on AND/ASPEN Guidelines):  1. Energy Intake-Less than or equal to 75% of estimated energy requirement, Greater than or equal to 5 days    2. Weight Loss-5% loss or greater, (in 2 months)  3. Fat Loss-No significant subcutaneous fat loss,    4. Muscle Loss-Mild muscle mass loss, Temples (temporalis muscle)  5. Fluid Accumulation-Mild fluid accumulation, Generalized    Nutrition Risk Level:  Moderate    Nutrient Needs:  · Estimated Daily Total Kcal: 7916-2178 kcal/day  · Estimated Daily Protein (g): 75-90 gm pro/day    Nutrition Diagnosis:   · Problem: Inadequate oral intake  · Etiology: related to (Appetite, Current Condition)     Signs and symptoms:  as evidenced by Diet history of poor intake(variable PO intakes, need for oral supplements)    Objective Information:  · Nutrition-Focused Physical Findings:    · Wound Type: Diabetic Ulcer(to left heel)  · Current Nutrition Therapies:  · Oral Diet Orders: Carb Control 4 Carbs/Meal, No Added Salt (3-4gm)   · Oral Diet intake: 26-50%  · Oral Nutrition Supplement (ONS) Orders: None  · ONS intake: 51-75%, %  · Anthropometric Measures:  · Ht: 5' 10\" (177.8 cm) · Current Body Wt: 203 lb 7.8 oz (92.3 kg)  · Admission Body Wt: 198 lb 6.6 oz (90 kg)  · % Weight Change:  ,  7.9% x 2 months per EHR review  · Ideal Body Wt: 165 lb 12.6 oz (75.2 kg), % Ideal Body 120%  · BMI Classification: BMI 25.0 - 29.9 Overweight    Nutrition Interventions:   Continue current diet, Start ONS  Continued Inpatient Monitoring, Education Not Indicated    Nutrition Evaluation:   · Evaluation: Progressing toward goals   · Goals: Meet % of estimated nutrition needs.     · Monitoring: Meal Intake, Supplement Intake, Diet Tolerance, Skin Integrity, Wound Healing, I&O, Weight, Pertinent Labs, Monitor Hemodynamic Status    Electronically signed by Penny Horowitz RD, LD on 5/31/19 at 3:25 PM    Contact Number: [phone]

## 2019-05-31 NOTE — CARE COORDINATION
Care Transition  Per Juvenal Buckley at [facility] he is accepted at any time. He may need wound vac tomorrow if wound is healthy. Per [facility] Centralized Intake they can place wound vac. Messaged Dr. Wild January she is ok snf places VAC. Dr. Sindi Rojas states she has made her changes and patient may go home. Dr Donald Rater said patient can go to SNF tonight. He will reconcile all meds and update labs orders. Called [facility] at [facility] informed her Dali Loving is requested at 4589, will need [facility] wound care 1 week and appt with Dr. Sindi Rojas. She is ok Dr. Donald Rater enters all orders in [address] [place], [place] [address]s [place] for vanco trough labs instructions.

## 2019-05-31 NOTE — CARE COORDINATION
Transitional planning. [facility] informed  that pt would have to be transferred with [facility] transportation. Called [facility], on hold for 15 minutes then informed that pt's insurance doesn't cover stretcher transportation. Informed Quynh Flynn. Approval for [employer] transportation approved.      1550 Confirmed  time of 2100 with Bri with [address] [place] [address] Rd to [place] with Intake at [facility] and informed her that pt would be transferred at 9pm.     Report to be called to: [phone]  Fax AGNIESZKA/AVS to [phone]

## 2019-05-31 NOTE — BRIEF OP NOTE
Brief Postoperative Note for PICC Placement    Mehdi Abdi  YOB: 1950  [id number]    Pre-operative Diagnosis: sepsis; need for antibiotics    Post-operative Diagnosis: Same    Procedure: PICC Placement. Anesthesia: Local      Surgeons/Assistants: , Juanita Aguilar PA-C    Estimated Blood Loss: Minimal    Complications: none    Specimens: were not obtained    Findings: 5 Fr dual lumen Power Picc placed in right basilic vein . Cut to 41 cm. Statlock securement dressing applied. Vital signs monitored and stable. May use PICC.     Electronically signed by Juanita Aguilar PA-C on 5/31/2019 at 9:49 AM

## 2019-05-31 NOTE — PROGRESS NOTES
[facility]  Internal Medicine Residency Program  Inpatient Daily Progress Note  ______________________________________________________________________________    Patient: Alvaro Mcmillan  YOB: 1950   MRN: [id number]    Acct: [de-identified]     Admit date: 5/22/2019  Today's date: 05/31/19  Number of days in the hospital: 8     Admitting Diagnosis: Upper GI bleed    Subjective:   Pt seen and Chart reviewed. Afebrile. Hemodynamically stable. No leukocytosis. MRI results noted. Abscess and fluid collections with some gas. No osteomyelitis noted. I&D by podiatry 5/29. Possible wound Vac tomorrow per podiatry. ID following. Currently on Vancomycin/Zosn for foot. Diflucan for possible cadidida esophagitis. Day 8/10. Patient had picc line placed for 6 weeks abx. GI: Severe esophagitis seen on EGD. Possible Candidia. On PPI, Carafate, and Diflucan. Hgb 6.9 yesterday. No transfusion. Repeat today was 7.2. Patient has been stable 7-7.5 Hgb. Lopressor 12.5 started for tachycardia. HR improved. EKG with PAC/PVC and blocked PAC creating irregular rhythm. TSH WNL. Objective:   Vital Sign:  BP (!) 144/61   Pulse 92   Temp 98 °F (36.7 °C) (Oral)   Resp 21   Ht 5' 10\" (1.778 m)   Wt 203 lb 7.8 oz (92.3 kg)   SpO2 97%   BMI 29.20 kg/m²       Physical Exam:  General appearance:   alert, well appearing, and in no distress  Mental Status: alert, oriented to person, place, and time  Neurologic:  alert, oriented, normal speech, no focal findings or movement disorder noted  Lungs:  clear to auscultation  Heart[de-identified] normal rate, regular rhythm, normal S1, S2  Abdomen:  soft, nontender, nondistended, no masses or organomegaly  Extremities: peripheral pulses normal, no pedal edema, no clubbing or cyanosis   Skin: normal coloration and turgor, no rashes, no suspicious skin lesions noted. Ulceration of the left foot. Clean wrapping currently. Medications:  Scheduled Medications   sodium chloride flush  10 mL Intravenous 2 times per day    tamsulosin  0.4 mg Oral Daily    vancomycin  1,000 mg Intravenous Q12H    insulin glargine  40 Units Subcutaneous BID    pantoprazole  40 mg Oral BID AC    vancomycin (VANCOCIN) intermittent dosing (placeholder)   Other RX Placeholder    vitamin D3  800 Units Oral Daily    insulin lispro  0-18 Units Subcutaneous TID WC    insulin lispro  0-9 Units Subcutaneous Nightly    metoprolol tartrate  12.5 mg Oral BID    sodium chloride (PF)  10 mL Intravenous BID    heparin (porcine)  5,000 Units Subcutaneous 3 times per day    QUEtiapine  300 mg Oral Nightly    IVPB builder   Intravenous Q8H    lidocaine   Topical Once    citalopram  30 mg Oral Daily    atorvastatin  40 mg Oral Daily    sodium chloride flush  10 mL Intravenous 2 times per day    gabapentin  100 mg Oral TID    sodium chloride  250 mL Intravenous Once    fluconazole  100 mg Intravenous Q24H    sucralfate  1 g Oral 4 times per day       PRN Medications    sodium chloride flush 10 mL PRN   sodium chloride flush 10 mL PRN   sodium phosphate IVPB 0.16 mmol/kg PRN   Or     sodium phosphate IVPB 0.32 mmol/kg PRN   labetalol 10 mg Q6H PRN   oxyCODONE 5 mg Q4H PRN   sodium chloride flush 10 mL PRN   magnesium hydroxide 30 mL Daily PRN   ondansetron 4 mg Q6H PRN   senna 1 tablet Daily PRN   acetaminophen 650 mg Q4H PRN   glucose 15 g PRN   dextrose 12.5 g PRN   glucagon (rDNA) 1 mg PRN   dextrose 100 mL/hr PRN       Diagnostic Labs and Imaging:  CBC:  Recent Labs     05/29/19  0500 05/30/19  0923 05/31/19  0552   WBC 8.7 9.9 8.7   HGB 7.2* 6.9* 7.2*    287 322     BMP:   Recent Labs     05/30/19  0923   *   K 3.8      CO2 18*   BUN 14   CREATININE 1.17   GLUCOSE 245*     Assessment and Plan:   · IDDM- Increased Lantus to 40 units BID. Insulin sliding scale.   · Acute blood loss anemia-Stabilized , EGD showed severe esophagitis on IV PPI BID and carfarate Hb stable at 7.2. On Diflucan Day 8/10. H & H Q12. IV venoferx 3 doses. · LAMBERTO resolved stop IV fluids. Nephrology signed off. · Left leg wound managed by podiatry. MRI results noted. Abscess and fluid collections with some gas. S/p I&D 5/29. Possible wound vac placement tomorrow per podiatry. PICC line placed per ID for 6 weeks of antibiotic therapy. · Continue home medications Celexa, Gabapentin, Lipitor. · PT OT VINITA Diaz MD  Internal Medicine Resident, PGY-1  [address], [place]  5/31/2019, 11:07 AM      Attending Physician Statement  I have discussed the case, including pertinent history and exam findings with the resident and the team.  I have seen and examined the patient and the key elements of the encounter have been performed by me. I agree with the assessment, plan and orders as documented by the resident.         Gt Avalos MD, MARKY, [address]  Attending Physician, Internal Medicine Service    Internal Medicine Residency Program  5/31/2019, 11:19 AM

## 2019-06-01 NOTE — PROGRESS NOTES
Patient was discharged at 2230 via ambulance, on a stretcher to [address]. Report was called in to [facility]. Paperwork given to EMS. Pt stable at time of departure, previously medicated for pain and nausea.

## 2019-06-06 NOTE — TELEPHONE ENCOUNTER
Nurse from [facility] called into office with questions if patient should be using wound vac during post-op status. Writer informed caller patient has never seen any of our docs and that Dr. Saul Welsh did the patients surgery, caller verbalized understanding. Writer informed call would be transferred to [facility] as the office would be better to assist questions and concerns during patient post-op status. Caller verbalized understanding. Call transferred to Shy Akhtar at [facility].  Rosa Pascual

## 2019-06-06 NOTE — TELEPHONE ENCOUNTER
Holly Marshall from [facility] called to get post op appointment. Surgery was 5/29/19. He came to the facility with a wound vac but no orders to place it. They are questioning if he should have this on. They have been doing wound care daily. He is coming to office for post-op visit on 6/10/19.

## 2019-06-11 NOTE — PROGRESS NOTES
[facility]  Return Patient Progress Note      Lorenza Granda  AGE: 76 y.o. GENDER: male  : 1950  TODAY'S DATE:  2019  Chief Complaint   Patient presents with    Post-Op Check     left heel     Other     last blood sugar: between 80-90       Subjective:       Avtar Narayan is a 76 y.o. male presents to the office today for follow up of an ulceration. I saw him in the hospital for a large, pressure ulcer left heel. I did an Or debridement. He had a VAC in the hospital but not in the NH. Doing daily wet to dry. Minimal pain. Denies F/C/N/V. Wound Type:pressure  Wound Location:left foot  Modifying factors:chronic pressure, decreased mobility, shear force, arterial insufficiency and decreased tissue oxygenation            Lab Results   Component Value Date    LABA1C 12 2019       ALLERGIES    Allergies   Allergen Reactions    Keflex [Cephalexin] Hives       Medications:    Current Outpatient Medications:     vitamin D3 (CHOLECALCIFEROL) 400 units TABS tablet, Take 2 tablets by mouth daily, Disp: 30 tablet, Rfl: 2    sucralfate (CARAFATE) 1 GM tablet, Take 1 tablet by mouth 4 times daily, Disp: 120 tablet, Rfl: 0    pantoprazole (PROTONIX) 40 MG tablet, Take 1 tablet by mouth 2 times daily (before meals) for 30 days, THEN 1 tablet daily. , Disp: 150 tablet, Rfl: 0    ciprofloxacin (CIPRO) 500 MG tablet, Take 1 tablet by mouth 2 times daily, Disp: 68 tablet, Rfl: 0    metroNIDAZOLE (FLAGYL) 500 MG tablet, Take 1 tablet by mouth 3 times daily, Disp: 102 tablet, Rfl: 0    metoprolol tartrate (LOPRESSOR) 25 MG tablet, Take 0.5 tablets by mouth 2 times daily, Disp: 60 tablet, Rfl: 3    ferrous sulfate 325 (65 Fe) MG tablet, Take 1 tablet by mouth 2 times daily, Disp: 60 tablet, Rfl: 3    vancomycin (VANCOCIN) infusion, Infuse 1,000 mg intravenously every 12 hours Stop 7/3/19 - Pharmacy to dose, get a trough twice a week and a creatinine twice a week, CBC weekly  Aim trough between 14 and 17  Adjust dose as needed. After patient uses home  Compound per protocol., Disp: 88633 mg, Rfl: 0    Soft Lens Products (STERILE SALINE) SOLN, Apply to wound. Cleanse wound than apply as a wet to dry dressing., Disp: 1 Bottle, Rfl: 1    Gauze Pads & Dressings (GAUZE DRESSING) 4\"X4\" PADS, Apply to wound. , Disp: 100 each, Rfl: 1    Gauze Bandages (ROLLED GAUZE BANDAGE 4\"X2. 5YD) MISC, Apply to wound. , Disp: 30 each, Rfl: 1    collagenase (SANTYL) 250 UNIT/GM ointment, Apply topically daily. , Disp: 1 Tube, Rfl: 1    Gauze Pads & Dressings (GAUZE DRESSING) 4\"X4\" PADS, Apply to wound. , Disp: 100 each, Rfl: 1    Gauze Bandages (ROLLED GAUZE BANDAGE 4\"X2. 5YD) MISC, Apply to wound. , Disp: 5 each, Rfl: 1    Soft Lens Products (SALINE) 0.9 % SOLN, Apply 1 Bottle topically 2 times daily Apply a wet to dry twice a day. Can also use to cleanse area prior to dressing, Disp: 1 Bottle, Rfl: 2    blood glucose monitor strips, Check blood sugars daily as directed., Disp: 50 strip, Rfl: 6    atorvastatin (LIPITOR) 40 MG tablet, Take 1 tablet by mouth daily, Disp: 90 tablet, Rfl: 1    gabapentin (NEURONTIN) 300 MG capsule, Take 1 capsule by mouth 3 times daily for 180 days. Intended supply: 30 days, Disp: 90 capsule, Rfl: 5    Wound Dressings (FIBRACOL) MISC, Apply to wound. , Disp: 12 each, Rfl: 1    Continuous Blood Gluc  (FREESTYLE SOFI 14 DAY READER) DONG, Use daily, Disp: 2 Device, Rfl: 5    Continuous Blood Gluc Sensor (FREESTYLE SOFI 14 DAY SENSOR) MISC, Use daily, Disp: 2 each, Rfl: 5    lisinopril (PRINIVIL;ZESTRIL) 5 MG tablet, Take 1 tablet by mouth daily, Disp: 30 tablet, Rfl: 5    diclofenac (VOLTAREN) 75 MG EC tablet, Take 1 tablet by mouth 2 times daily (with meals), Disp: 60 tablet, Rfl: 3    insulin glargine (TOUJEO SOLOSTAR) 300 UNIT/ML injection pen, Inject 50 Units into the skin nightly, Disp: 2 pen, Rfl: 5    metFORMIN (GLUCOPHAGE) 1000 MG tablet, Take 1 tablet by cm    100%  of wound debrided. Patient tolerated procedure well. Pain 2 / 10 during procedure and pain 0 / 10 after procedure. Discussed appropriate home care of this wound. Wound redressed. Patient instructions were given. Offloading at all times  Start VAC, orders written      Discussed appropriate home care of this wound. Dressings:   Orders Placed This Encounter   Procedures    Misc nursing order (specify)     Scheduling Instructions:      Please use wound vac on left heel wound. Cover with adaptic and black foam pressure 150 continuous    MS DEBRIDEMENT OPEN WOUND 20 SQ CM<    MS DEBRIDEMENT OPEN WOUND EA ADDL 20 SQ CM      No orders of the defined types were placed in this encounter. Follow up: 1 week. at wound care    Electronically signed by Benjamin Finnegan DPM on 6/11/2019 at 9:59 AM  6/10/2019

## 2019-06-12 PROBLEM — R40.0 SOMNOLENCE: Status: ACTIVE | Noted: 2019-01-01

## 2019-06-12 PROBLEM — K21.9 GASTROESOPHAGEAL REFLUX DISEASE WITHOUT ESOPHAGITIS: Status: ACTIVE | Noted: 2019-01-01

## 2019-06-12 PROBLEM — I95.9 HYPOTENSION: Status: ACTIVE | Noted: 2019-01-01

## 2019-06-12 PROBLEM — N17.9 AKI (ACUTE KIDNEY INJURY) (HCC): Status: ACTIVE | Noted: 2019-01-01

## 2019-06-12 PROBLEM — I73.9 PAD (PERIPHERAL ARTERY DISEASE) (HCC): Status: ACTIVE | Noted: 2019-01-01

## 2019-06-12 PROBLEM — B37.81 ESOPHAGEAL CANDIDIASIS (HCC): Status: ACTIVE | Noted: 2019-01-01

## 2019-06-12 PROBLEM — J18.9 PNEUMONIA OF BOTH LOWER LOBES DUE TO INFECTIOUS ORGANISM: Status: ACTIVE | Noted: 2019-01-01

## 2019-06-12 PROBLEM — I96 GANGRENE OF LEFT FOOT (HCC): Status: ACTIVE | Noted: 2019-01-01

## 2019-06-12 PROBLEM — G47.09 OTHER INSOMNIA: Status: ACTIVE | Noted: 2019-01-01

## 2019-06-12 PROBLEM — M15.9 PRIMARY OSTEOARTHRITIS INVOLVING MULTIPLE JOINTS: Status: ACTIVE | Noted: 2019-01-01

## 2019-06-12 PROBLEM — Z79.4 TYPE 2 DIABETES MELLITUS, WITH LONG-TERM CURRENT USE OF INSULIN (HCC): Status: ACTIVE | Noted: 2019-01-01

## 2019-06-12 PROBLEM — F33.1 MODERATE EPISODE OF RECURRENT MAJOR DEPRESSIVE DISORDER (HCC): Status: ACTIVE | Noted: 2019-01-01

## 2019-06-12 PROBLEM — E11.9 TYPE 2 DIABETES MELLITUS, WITH LONG-TERM CURRENT USE OF INSULIN (HCC): Status: ACTIVE | Noted: 2019-01-01

## 2019-06-18 NOTE — PROGRESS NOTES
Patients [facility] wound care appointment canceled today due to patient being hospitalized in [address] [address] [facility].

## 2019-07-15 NOTE — ED PROVIDER NOTES
palliative at this time. We will bring papers to the emergency department as soon as she can.    [RB]   [id number] Per conversation with power of  at bedside, she does not wish for any further care to be done, no central lines no intubations no medicines. She wishes patient to be palliative. Will arrange for social work to discuss placement. If placement unable to be obtained will admit patient until placement is possible. [RB]   12 Family is still conversing with hospice for hospice assessment if unable to obtain hospice bed will admit. [RB]      ED Course User Index  [RB] Wai Herman,        BP: (!) 112/53, Temp: 101.3 °F (38.5 °C), Pulse: 128, Resp: (!) 32      Comments            Sutton MD, F.A.C.E.P.   Attending Emergency Physician         James Kumar MD  07/15/19 [address], [place]  07/16/19 8219

## 2019-07-15 NOTE — ED PROVIDER NOTES
history that includes hernia repair; Tonsillectomy; Upper gastrointestinal endoscopy (N/A, 2019); and Foot Debridement (Left, 2019).     Social History     Socioeconomic History    Marital status: Single     Spouse name: Not on file    Number of children: Not on file    Years of education: Not on file    Highest education level: Not on file   Occupational History    Not on file   Social Needs    Financial resource strain: Not on file    Food insecurity:     Worry: Not on file     Inability: Not on file    Transportation needs:     Medical: Not on file     Non-medical: Not on file   Tobacco Use    Smoking status: Former Smoker     Packs/day: 2.00     Years: 25.00     Pack years: 50.00     Types: Cigarettes     Last attempt to quit: 1995     Years since quittin.0    Smokeless tobacco: Never Used   Substance and Sexual Activity    Alcohol use: No     Alcohol/week: 0.0 standard drinks     Comment: occassionally    Drug use: No    Sexual activity: Not on file   Lifestyle    Physical activity:     Days per week: Not on file     Minutes per session: Not on file    Stress: Not on file   Relationships    Social connections:     Talks on phone: Not on file     Gets together: Not on file     Attends [employer] service: Not on file     Active member of club or organization: Not on file     Attends meetings of clubs or organizations: Not on file     Relationship status: Not on file    Intimate partner violence:     Fear of current or ex partner: Not on file     Emotionally abused: Not on file     Physically abused: Not on file     Forced sexual activity: Not on file   Other Topics Concern    Not on file   Social History Narrative    Not on file       Family History   Problem Relation Age of Onset    Alzheimer's Disease Mother     Arthritis Mother     Diabetes Father     Heart Disease Father     Heart Disease Paternal Grandfather        Allergies:    Keflex [cephalexin]    Home components:    POC Glucose 407 (*)     All other components within normal limits   CULTURE BLOOD #1   CULTURE BLOOD #2   URINE CULTURE   LIPASE   LACTATE, SEPSIS   APTT   TSH WITH REFLEX   URINALYSIS       RADIOLOGY:  Xr Foot Left (min 3 Views)    Result Date: 7/15/2019  EXAMINATION: 3 XRAY VIEWS OF THE LEFT FOOT 7/15/2019 8:10 am COMPARISON: 22 May 2019 HISTORY: ORDERING SYSTEM PROVIDED HISTORY: r/o osteo TECHNOLOGIST PROVIDED HISTORY: r/o osteo Ill debridement 29 May 2019. FINDINGS: There has been interval performance of a healed debridement. Soft tissue defect over the posterior calcaneus is noted. Subtle demineralization in the cortical area the posterior calcaneus is present which could be related to debridement with early osteomyelitis not excluded. Vascular calcification about the ankle is noted. Interval performance of a soft tissue debridement. Subtle irregularity along the dorsum of the calcaneus which may be related to debridement with early osteomyelitis not excluded. RECOMMENDATION: Advise radionuclide bone imaging or MRI imaging for further assessment. Xr Chest Portable    Result Date: 7/15/2019  EXAMINATION: ONE XRAY VIEW OF THE CHEST 7/15/2019 8:10 am COMPARISON: May 22, 2018 HISTORY: ORDERING SYSTEM PROVIDED HISTORY: sob TECHNOLOGIST PROVIDED HISTORY: sob FINDINGS: There is been interval development patchy consolidation involving the lower half right hemithorax and left base most consistent with multifocal pneumonia. Hazy opacification of the right base could indicate the presence of effusion. No pneumothorax. Stable cardiomediastinal silhouette. No significant pulmonary edema. Multifocal/multi lobar pneumonia suspected. Follow-up chest radiographs in 6-8 weeks would be recommended after appropriate therapy has been administered to ensure resolution.          EMERGENCY DEPARTMENT COURSE:  ED Course as of Jul 15 1359   Mon Jul 15, 2019   8937 Patient is DNR CC    [RB]   4942 No power

## 2019-07-15 NOTE — ED NOTES
Bed: 25  Expected date:   Expected time:   Means of arrival:   Comments:  [facility] [address] [facility] [address] [place], [place]  07/15/19 0372

## 2019-07-18 LAB
CULTURE: ABNORMAL
Lab: ABNORMAL
Lab: ABNORMAL
SPECIMEN DESCRIPTION: ABNORMAL
SPECIMEN DESCRIPTION: ABNORMAL

## 2019-08-07 ENCOUNTER — TELEPHONE (OUTPATIENT)
Dept: PEDIATRIC CARDIOLOGY | Age: 69
End: 2019-08-07

## 2023-01-11 NOTE — PROGRESS NOTES
Follow up sent to clinician's office.   Procedure: Patient understands that L foot bedside debridement of nonviable soft tissue needs to be performed to remove nonviable infected tissue. Risks and benefits of procedure discussed. Patient agrees to procedure. Left foot was marked. Timeout performed with patient, RN, and writer in room. 6cc of 1% lidocaine plain was used for a local block. Attention was directed to the left medial heel. A stab incision was made with #15 blade over apex of the abscess to the level of subcutaneous tissue. 2cc of purulent drainage expressed. The tissue planes were dilated using a hemostat and any loculations were freed. No exposed bone was noted. Wound did not sinus track beyond 1 cm in any direction to wound. Roughly 1cc purulence was expressed. Boggy Necrotic eschar resected using sterile 15 blade down to level of subcutaneous tissue. 100% of wound was debrided. The site was irrigated with sterile normal saline. Wet to dry betadine sterile dressing applied. Hemostasis spontaneous with direct pressure and packing. Reports 0/10 post debridement pain. Patient tolerated procedure well.      Electronically signed by Robert Oliver DPM on 5/23/2019 at 1:01 PM
